# Patient Record
Sex: MALE | Race: WHITE | NOT HISPANIC OR LATINO | URBAN - METROPOLITAN AREA
[De-identification: names, ages, dates, MRNs, and addresses within clinical notes are randomized per-mention and may not be internally consistent; named-entity substitution may affect disease eponyms.]

---

## 2017-08-15 ENCOUNTER — OFFICE (AMBULATORY)
Dept: URBAN - METROPOLITAN AREA CLINIC 75 | Facility: CLINIC | Age: 57
End: 2017-08-15
Payer: COMMERCIAL

## 2017-08-15 VITALS — SYSTOLIC BLOOD PRESSURE: 118 MMHG | DIASTOLIC BLOOD PRESSURE: 74 MMHG | WEIGHT: 5 LBS

## 2017-08-15 DIAGNOSIS — R13.10 DYSPHAGIA, UNSPECIFIED: ICD-10-CM

## 2017-08-15 PROCEDURE — 99202 OFFICE O/P NEW SF 15 MIN: CPT

## 2017-09-07 VITALS
SYSTOLIC BLOOD PRESSURE: 148 MMHG | RESPIRATION RATE: 16 BRPM | OXYGEN SATURATION: 100 % | SYSTOLIC BLOOD PRESSURE: 117 MMHG | HEART RATE: 53 BPM | RESPIRATION RATE: 13 BRPM | OXYGEN SATURATION: 98 % | OXYGEN SATURATION: 99 % | TEMPERATURE: 97.4 F | SYSTOLIC BLOOD PRESSURE: 125 MMHG | DIASTOLIC BLOOD PRESSURE: 64 MMHG | SYSTOLIC BLOOD PRESSURE: 110 MMHG | SYSTOLIC BLOOD PRESSURE: 124 MMHG | RESPIRATION RATE: 20 BRPM | OXYGEN SATURATION: 96 % | HEIGHT: 67 IN | DIASTOLIC BLOOD PRESSURE: 61 MMHG | HEART RATE: 60 BPM | DIASTOLIC BLOOD PRESSURE: 84 MMHG | DIASTOLIC BLOOD PRESSURE: 80 MMHG | HEART RATE: 51 BPM | HEART RATE: 58 BPM | SYSTOLIC BLOOD PRESSURE: 133 MMHG | HEART RATE: 54 BPM | DIASTOLIC BLOOD PRESSURE: 75 MMHG | TEMPERATURE: 98 F | DIASTOLIC BLOOD PRESSURE: 73 MMHG | SYSTOLIC BLOOD PRESSURE: 135 MMHG | HEART RATE: 52 BPM | WEIGHT: 182 LBS | SYSTOLIC BLOOD PRESSURE: 118 MMHG | HEART RATE: 64 BPM | DIASTOLIC BLOOD PRESSURE: 78 MMHG | SYSTOLIC BLOOD PRESSURE: 107 MMHG | HEART RATE: 55 BPM | DIASTOLIC BLOOD PRESSURE: 63 MMHG

## 2017-09-11 ENCOUNTER — OFFICE (AMBULATORY)
Dept: URBAN - METROPOLITAN AREA CLINIC 64 | Facility: CLINIC | Age: 57
End: 2017-09-11
Payer: COMMERCIAL

## 2017-09-11 ENCOUNTER — AMBULATORY SURGICAL CENTER (AMBULATORY)
Dept: URBAN - METROPOLITAN AREA SURGERY 17 | Facility: SURGERY | Age: 57
End: 2017-09-11
Payer: COMMERCIAL

## 2017-09-11 DIAGNOSIS — Z12.11 ENCOUNTER FOR SCREENING FOR MALIGNANT NEOPLASM OF COLON: ICD-10-CM

## 2017-09-11 DIAGNOSIS — R13.10 DYSPHAGIA, UNSPECIFIED: ICD-10-CM

## 2017-09-11 DIAGNOSIS — R93.3 ABNORMAL FINDINGS ON DIAGNOSTIC IMAGING OF OTHER PARTS OF DI: ICD-10-CM

## 2017-09-11 DIAGNOSIS — K31.89 OTHER DISEASES OF STOMACH AND DUODENUM: ICD-10-CM

## 2017-09-11 DIAGNOSIS — K29.70 GASTRITIS, UNSPECIFIED, WITHOUT BLEEDING: ICD-10-CM

## 2017-09-11 LAB
GI HISTOLOGY: A. SELECT: (no result)
GI HISTOLOGY: B. SELECT: (no result)
GI HISTOLOGY: PDF REPORT: (no result)

## 2017-09-11 PROCEDURE — 45378 DIAGNOSTIC COLONOSCOPY: CPT

## 2017-09-11 PROCEDURE — 88305 TISSUE EXAM BY PATHOLOGIST: CPT

## 2017-09-11 PROCEDURE — 43239 EGD BIOPSY SINGLE/MULTIPLE: CPT | Mod: 59

## 2017-09-11 RX ADMIN — PROPOFOL 50 MG: 10 INJECTION, EMULSION INTRAVENOUS at 10:05

## 2017-09-11 RX ADMIN — PROPOFOL 100 MG: 10 INJECTION, EMULSION INTRAVENOUS at 10:01

## 2017-09-11 RX ADMIN — PROPOFOL 50 MG: 10 INJECTION, EMULSION INTRAVENOUS at 10:06

## 2017-11-09 VITALS
HEIGHT: 67 IN | DIASTOLIC BLOOD PRESSURE: 84 MMHG | WEIGHT: 195 LBS | HEART RATE: 78 BPM | SYSTOLIC BLOOD PRESSURE: 106 MMHG

## 2017-11-14 ENCOUNTER — OFFICE (AMBULATORY)
Dept: URBAN - METROPOLITAN AREA CLINIC 75 | Facility: CLINIC | Age: 57
End: 2017-11-14
Payer: COMMERCIAL

## 2017-11-14 DIAGNOSIS — R13.10 DYSPHAGIA, UNSPECIFIED: ICD-10-CM

## 2017-11-14 PROCEDURE — 99212 OFFICE O/P EST SF 10 MIN: CPT

## 2019-08-13 ENCOUNTER — OFFICE VISIT (OUTPATIENT)
Dept: ORTHOPEDIC SURGERY | Facility: CLINIC | Age: 59
End: 2019-08-13

## 2019-08-13 DIAGNOSIS — M25.561 PAIN IN BOTH KNEES, UNSPECIFIED CHRONICITY: ICD-10-CM

## 2019-08-13 DIAGNOSIS — M17.0 PRIMARY OSTEOARTHRITIS OF BOTH KNEES: Primary | ICD-10-CM

## 2019-08-13 DIAGNOSIS — M25.562 PAIN IN BOTH KNEES, UNSPECIFIED CHRONICITY: ICD-10-CM

## 2019-08-13 PROCEDURE — 73562 X-RAY EXAM OF KNEE 3: CPT | Performed by: PHYSICIAN ASSISTANT

## 2019-08-13 PROCEDURE — 99203 OFFICE O/P NEW LOW 30 MIN: CPT | Performed by: PHYSICIAN ASSISTANT

## 2019-08-13 RX ORDER — POTASSIUM CHLORIDE 20MEQ/15ML
LIQUID (ML) ORAL DAILY
COMMUNITY
End: 2021-03-23

## 2019-08-13 RX ORDER — AMLODIPINE BESYLATE 10 MG/1
10 TABLET ORAL DAILY
Refills: 0 | COMMUNITY
Start: 2019-07-10 | End: 2021-03-24

## 2019-08-13 RX ORDER — GABAPENTIN 300 MG/1
300 CAPSULE ORAL NIGHTLY
COMMUNITY
Start: 2018-12-04 | End: 2022-05-03

## 2019-08-13 RX ORDER — LOSARTAN POTASSIUM AND HYDROCHLOROTHIAZIDE 25; 100 MG/1; MG/1
1 TABLET ORAL DAILY
COMMUNITY
End: 2020-02-25 | Stop reason: SDUPTHER

## 2019-08-13 RX ORDER — FUROSEMIDE 40 MG/1
40 TABLET ORAL 2 TIMES DAILY
Refills: 0 | COMMUNITY
Start: 2019-05-24 | End: 2021-03-23

## 2019-08-13 RX ORDER — CARVEDILOL 12.5 MG/1
12.5 TABLET ORAL 2 TIMES DAILY
COMMUNITY
Start: 2019-04-08 | End: 2021-08-19

## 2019-08-20 ENCOUNTER — CLINICAL SUPPORT (OUTPATIENT)
Dept: ORTHOPEDIC SURGERY | Facility: CLINIC | Age: 59
End: 2019-08-20

## 2019-08-20 VITALS — WEIGHT: 219 LBS | TEMPERATURE: 97.9 F | BODY MASS INDEX: 34.37 KG/M2 | HEIGHT: 67 IN

## 2019-08-20 DIAGNOSIS — M17.0 PRIMARY OSTEOARTHRITIS OF BOTH KNEES: Primary | ICD-10-CM

## 2019-08-20 PROBLEM — M25.562 PAIN IN BOTH KNEES: Status: ACTIVE | Noted: 2019-08-20

## 2019-08-20 PROBLEM — M25.561 PAIN IN BOTH KNEES: Status: ACTIVE | Noted: 2019-08-20

## 2019-08-20 PROCEDURE — 20610 DRAIN/INJ JOINT/BURSA W/O US: CPT | Performed by: PHYSICIAN ASSISTANT

## 2019-08-20 RX ORDER — LIDOCAINE HYDROCHLORIDE 10 MG/ML
2 INJECTION, SOLUTION EPIDURAL; INFILTRATION; INTRACAUDAL; PERINEURAL
Status: COMPLETED | OUTPATIENT
Start: 2019-08-20 | End: 2019-08-20

## 2019-08-20 RX ADMIN — LIDOCAINE HYDROCHLORIDE 2 ML: 10 INJECTION, SOLUTION EPIDURAL; INFILTRATION; INTRACAUDAL; PERINEURAL at 10:15

## 2019-08-20 RX ADMIN — LIDOCAINE HYDROCHLORIDE 2 ML: 10 INJECTION, SOLUTION EPIDURAL; INFILTRATION; INTRACAUDAL; PERINEURAL at 10:44

## 2019-08-20 NOTE — PROGRESS NOTES
NEW VISIT    Patient: Jeyson Rose  ?  YOB: 1960    MRN: [id number]  ?  Chief Complaint   Patient presents with   • Left Knee - Establish Care, Pain   • Right Knee - Establish Care, Pain      ?  HPI:   59-year-old male patient presents today as a new patient with complaint of bilateral knee pain.  He states he has had the knee pain for several years and has a history of arthroscopy in both knees as well as steroid injections without any relief of symptoms.  He reports that he remains very active and it becomes very painful if he were to get down on his knees.  He reports the most painful time is at nighttime when he is trying to rest.  He reports there is an aching pain and that the knees pop/grind upon any movement.  Pain Location: bilateral knee(s)  Radiation: To medial face of tibia  Quality: aching and burning  Intensity/Severity: moderate  Duration: 10+ year(s)  Onset quality: gradual   Timing: intermittent  Aggravating Factors: kneeling, standing for prolonged time and walking  Alleviating Factors: rest  Previous Episodes: yes  Associated Symptoms: pain, swelling  ADLs Affected: ambulating recreational activities/sports  Previous Treatment: History of right knee arthroscopy x3 and left knee arthroscopy x2, history of steroid injections without improvement in symptoms    This patient is a new patient.  This problem is new to this examiner.      Allergies:   Allergies   Allergen Reactions   • Sulfa Antibiotics Nausea Only       Medications:   Home Medications:  Current Outpatient Medications on File Prior to Visit   Medication Sig   • amLODIPine (NORVASC) 10 MG tablet Take 10 mg by mouth Daily.   • carvedilol (COREG) 12.5 MG tablet take 1 tablet by mouth twice a day   • furosemide (LASIX) 40 MG tablet Take 40 mg by mouth 2 (Two) Times a Day.   • gabapentin (NEURONTIN) 300 MG capsule Take 300 mg by mouth.   • losartan-hydrochlorothiazide (HYZAAR) 100-25 MG per tablet Take 1 tablet by mouth  "Daily.   • potassium chloride (KAYCIEL) 20 MEQ/15ML (10%) solution Take  by mouth Daily.     No current facility-administered medications on file prior to visit.      Current Medications:  Scheduled Meds:  PRN Meds:.    I have reviewed the patient's medical history in detail and updated the computerized patient record.  Review and summarization of old records include:    No past medical history on file.  No past surgical history on file.  Social History     Occupational History   • Not on file   Tobacco Use   • Smoking status: Former Smoker   Substance and Sexual Activity   • Alcohol use: Yes   • Drug use: Not on file   • Sexual activity: Not on file      Social History     Social History Narrative   • Not on file     No family history on file.      Review of Systems  Constitutional: Negative.  Negative for fever.   Eyes: Negative.    Respiratory: Negative.    Cardiovascular: Negative.    Endocrine: Negative.    Musculoskeletal: Positive for arthralgias, gait problem and joint swelling.   Skin: Negative.  Negative for rash and wound.   Allergic/Immunologic: Negative.    Neurological: Negative for numbness.   Hematological: Negative.    Psychiatric/Behavioral: Negative.         Wt Readings from Last 3 Encounters:   08/20/19 99.3 kg (219 lb)     Ht Readings from Last 3 Encounters:   08/20/19 170.2 cm (67\")     There is no height or weight on file to calculate BMI.  No height and weight on file for this encounter.  There were no vitals filed for this visit.      Physical Exam  Constitutional: Patient is oriented to person, place, and time. Appears well-developed and well-nourished.   HENT:   Head: Normocephalic and atraumatic.   Eyes: Conjunctivae and EOM are normal. Pupils are equal, round, and reactive to light.   Cardiovascular: Normal rate and intact distal pulses.   Pulmonary/Chest: Effort normal and breath sounds normal.   Musculoskeletal:   See detailed exam below   Neurological: Alert and oriented to person, " place, and time. No sensory deficit. Coordination normal.   Skin: Skin is warm and dry. Capillary refill takes less than 2 seconds. No rash noted. No erythema.   Psychiatric: Patient has a normal mood and affect. His behavior is normal. Judgment and thought content normal.   Nursing note and vitals reviewed.      Ortho Exam:   Affected Knee(s): Bilateral knee.   Patient has crepitus throughout range of motion.   Mild effusion on right, no effusion on left.   Mild joint line tenderness is noted on the medial aspect of the knees.   Patient has a varus orientation of the knee.   There is fullness and tenderness in the Popliteal fossa. Mild distention of a Popliteal cyst is noted in this location.   Range of motion: Left knee: Flexion 0- 120 degrees Right knee: Flexion 0-100 degrees  Neurovascular status is intact.  Dorsalis pedis and posterior tibial artery pulses are palpable. Common peroneal nerve function is well preserved.   Patient's gait is cautious and antalgic. Skin and soft tissues are mildly swollen, consistent with synovitis and effusion. The patient has a significant limp with the first few steps after starting the gait cycle.     Lachman negative,   Anterior drawernegative,   Posterior drawer negative,   Tessie's negative,   Patellofemoral grind positive,   Pivot shift is negative.         Diagnostics:  xrays obtained today  X-Ray Report:  Bilateral knee(s) X-Ray  Indication: Evaluation of bilateral knee pain  AP, Lateral views  Findings: Medial joint space narrowing bilaterally, greater on right than left with bone-on-bone appearance.  Patellofemoral joint space narrowing bilaterally and with osteophyte formation present.  Bony lesion: no  Soft tissues: increased  Joint spaces: decreased  Hardware appropriately positioned: not applicable  Prior studies available for comparison: no   X-RAY was ordered and reviewed by Angel Russo PA-C    This patient's x-ray report was graded according to the  Kellgren and Wilber classification.  This took into account the joint space narrowing, osteophyte formation, sclerosis of the distal femur/proximal tibia along with deformity of those bones.  The findings were indicative of K L grade 4 (right) and grade 3 (left).         Assessment:  Jeyson was seen today for establish care, pain, establish care and pain.    Diagnoses and all orders for this visit:    Pain in both knees, unspecified chronicity  -     XR Knee 3 View Bilateral      ?    Plan    · Discussion of x-ray findings in combination with physical exam and appropriate treatment options.   · Cortisone, Monovisc injection discussed  · Activity modifications discussed and recommended  · Use of hinge knee brace discussed and recommended  · Surgical options discussed right total knee.  Patient is aware that both knees will eventually require replacement although right knee will need total knee replacement earlier than left knee.  · Rest, ice, compression, and elevation (RICE) therapy  · Stretching and strengthening exercises  · Alternate OTC Ibuprofen and Tylenol as needed/if clinically indicated  · Patient would like to proceed with getting approval for Monovisc injections    Date of encounter: 08/13/2019   Angel Russo PA-C    Electronically signed by Angel Russo PA-C, 08/20/19, 9:58 AM.

## 2019-12-19 ENCOUNTER — OFFICE VISIT CONVERTED (OUTPATIENT)
Dept: FAMILY MEDICINE CLINIC | Facility: CLINIC | Age: 59
End: 2019-12-19
Attending: NURSE PRACTITIONER

## 2019-12-19 ENCOUNTER — HOSPITAL ENCOUNTER (OUTPATIENT)
Dept: FAMILY MEDICINE CLINIC | Facility: CLINIC | Age: 59
Discharge: HOME | End: 2019-12-19
Attending: NURSE PRACTITIONER

## 2019-12-19 ENCOUNTER — CONVERSION ENCOUNTER (OUTPATIENT)
Dept: FAMILY MEDICINE CLINIC | Facility: CLINIC | Age: 59
End: 2019-12-19

## 2019-12-19 LAB
ALBUMIN SERPL-MCNC: 4 G/DL (ref 3.5–5)
ALBUMIN/GLOB SERPL: 1.2 {RATIO} (ref 1.4–2.6)
ALP SERPL-CCNC: 69 U/L (ref 56–155)
ALT SERPL-CCNC: 34 U/L (ref 10–40)
ANION GAP SERPL CALC-SCNC: 21 MMOL/L (ref 8–19)
AST SERPL-CCNC: 19 U/L (ref 15–50)
BASOPHILS # BLD AUTO: 0.07 10*3/UL (ref 0–0.2)
BASOPHILS NFR BLD AUTO: 0.7 % (ref 0–3)
BILIRUB SERPL-MCNC: 0.25 MG/DL (ref 0.2–1.3)
BUN SERPL-MCNC: 17 MG/DL (ref 5–25)
BUN/CREAT SERPL: 14 {RATIO} (ref 6–20)
CALCIUM SERPL-MCNC: 9.5 MG/DL (ref 8.7–10.4)
CHLORIDE SERPL-SCNC: 102 MMOL/L (ref 99–111)
CHOLEST SERPL-MCNC: 132 MG/DL (ref 107–200)
CHOLEST/HDLC SERPL: 2.8 {RATIO} (ref 3–6)
CONV ABS IMM GRAN: 0.07 10*3/UL (ref 0–0.2)
CONV CO2: 22 MMOL/L (ref 22–32)
CONV IMMATURE GRAN: 0.7 % (ref 0–1.8)
CONV TOTAL PROTEIN: 7.4 G/DL (ref 6.3–8.2)
CREAT UR-MCNC: 1.24 MG/DL (ref 0.7–1.2)
DEPRECATED RDW RBC AUTO: 44 FL (ref 35.1–43.9)
EOSINOPHIL # BLD AUTO: 0.23 10*3/UL (ref 0–0.7)
EOSINOPHIL # BLD AUTO: 2.4 % (ref 0–7)
ERYTHROCYTE [DISTWIDTH] IN BLOOD BY AUTOMATED COUNT: 13.1 % (ref 11.6–14.4)
FOLATE SERPL-MCNC: 13.5 NG/ML (ref 4.8–20)
GFR SERPLBLD BASED ON 1.73 SQ M-ARVRAT: >60 ML/MIN/{1.73_M2}
GLOBULIN UR ELPH-MCNC: 3.4 G/DL (ref 2–3.5)
GLUCOSE SERPL-MCNC: 86 MG/DL (ref 70–99)
HCT VFR BLD AUTO: 43.7 % (ref 42–52)
HDLC SERPL-MCNC: 47 MG/DL (ref 40–60)
HGB BLD-MCNC: 14.4 G/DL (ref 14–18)
LDLC SERPL CALC-MCNC: 60 MG/DL (ref 70–100)
LYMPHOCYTES # BLD AUTO: 1.06 10*3/UL (ref 1–5)
LYMPHOCYTES NFR BLD AUTO: 10.9 % (ref 20–45)
MCH RBC QN AUTO: 30.1 PG (ref 27–31)
MCHC RBC AUTO-ENTMCNC: 33 G/DL (ref 33–37)
MCV RBC AUTO: 91.2 FL (ref 80–96)
MONOCYTES # BLD AUTO: 0.91 10*3/UL (ref 0.2–1.2)
MONOCYTES NFR BLD AUTO: 9.4 % (ref 3–10)
NEUTROPHILS # BLD AUTO: 7.37 10*3/UL (ref 2–8)
NEUTROPHILS NFR BLD AUTO: 75.9 % (ref 30–85)
NRBC CBCN: 0 % (ref 0–0.7)
OSMOLALITY SERPL CALC.SUM OF ELEC: 293 MOSM/KG (ref 273–304)
PLATELET # BLD AUTO: 296 10*3/UL (ref 130–400)
PMV BLD AUTO: 9.5 FL (ref 9.4–12.4)
POTASSIUM SERPL-SCNC: 4.2 MMOL/L (ref 3.5–5.3)
PSA SERPL-MCNC: 0.82 NG/ML (ref 0–4)
RBC # BLD AUTO: 4.79 10*6/UL (ref 4.7–6.1)
SODIUM SERPL-SCNC: 141 MMOL/L (ref 135–147)
TRIGL SERPL-MCNC: 123 MG/DL (ref 40–150)
TSH SERPL-ACNC: 4.86 M[IU]/L (ref 0.27–4.2)
VIT B12 SERPL-MCNC: 874 PG/ML (ref 211–911)
VLDLC SERPL-MCNC: 25 MG/DL (ref 5–37)
WBC # BLD AUTO: 9.71 10*3/UL (ref 4.8–10.8)

## 2019-12-20 LAB — 25(OH)D3 SERPL-MCNC: 35.9 NG/ML (ref 30–100)

## 2020-01-28 ENCOUNTER — TELEPHONE (OUTPATIENT)
Dept: ORTHOPEDIC SURGERY | Facility: CLINIC | Age: 60
End: 2020-01-28

## 2020-01-28 NOTE — TELEPHONE ENCOUNTER
Called the patient to see if the Monovisc injections he had gotten on 8/20/19 helped any?     The patient stated he could definitely tell a difference and that the injections did help especially in his left knee. He feels like the injections really helped his quality of life.

## 2020-02-24 RX ORDER — LOSARTAN POTASSIUM 25 MG/1
25 TABLET ORAL DAILY
COMMUNITY
End: 2020-02-25 | Stop reason: SDUPTHER

## 2020-02-24 RX ORDER — CARBAMAZEPINE 200 MG/1
200 TABLET ORAL DAILY
COMMUNITY
Start: 2019-07-02 | End: 2020-02-25 | Stop reason: SDUPTHER

## 2020-02-24 RX ORDER — POTASSIUM CHLORIDE 20 MEQ/1
20 TABLET, EXTENDED RELEASE ORAL
COMMUNITY
Start: 2016-09-14 | End: 2021-03-23

## 2020-02-24 RX ORDER — LIDOCAINE 40 MG/G
CREAM TOPICAL DAILY
COMMUNITY
Start: 2019-07-02 | End: 2021-03-23

## 2020-02-25 ENCOUNTER — CLINICAL SUPPORT (OUTPATIENT)
Dept: ORTHOPEDIC SURGERY | Facility: CLINIC | Age: 60
End: 2020-02-25

## 2020-02-25 VITALS — HEIGHT: 67 IN | TEMPERATURE: 98.1 F | WEIGHT: 224 LBS | BODY MASS INDEX: 35.16 KG/M2

## 2020-02-25 DIAGNOSIS — M17.0 PRIMARY OSTEOARTHRITIS OF BOTH KNEES: Primary | ICD-10-CM

## 2020-02-25 PROCEDURE — 20610 DRAIN/INJ JOINT/BURSA W/O US: CPT | Performed by: PHYSICIAN ASSISTANT

## 2020-02-25 RX ORDER — AMITRIPTYLINE HYDROCHLORIDE 25 MG/1
50 TABLET, FILM COATED ORAL DAILY
COMMUNITY
Start: 2020-01-06 | End: 2020-02-25 | Stop reason: SDUPTHER

## 2020-02-25 RX ORDER — LOSARTAN POTASSIUM 100 MG/1
100 TABLET ORAL DAILY
COMMUNITY
Start: 2020-02-12 | End: 2021-10-28 | Stop reason: SDUPTHER

## 2020-02-25 RX ORDER — LIDOCAINE HYDROCHLORIDE 10 MG/ML
2 INJECTION, SOLUTION EPIDURAL; INFILTRATION; INTRACAUDAL; PERINEURAL
Status: COMPLETED | OUTPATIENT
Start: 2020-02-25 | End: 2020-02-25

## 2020-02-25 RX ORDER — FUROSEMIDE 40 MG/1
40 TABLET ORAL
COMMUNITY
Start: 2019-05-24 | End: 2020-02-25 | Stop reason: SDUPTHER

## 2020-02-25 RX ORDER — GABAPENTIN 300 MG/1
300 CAPSULE ORAL 3 TIMES DAILY
COMMUNITY
Start: 2020-01-06 | End: 2020-07-04

## 2020-02-25 RX ORDER — CARVEDILOL 12.5 MG/1
TABLET ORAL
COMMUNITY
Start: 2019-04-08 | End: 2020-02-25 | Stop reason: SDUPTHER

## 2020-02-25 RX ADMIN — LIDOCAINE HYDROCHLORIDE 2 ML: 10 INJECTION, SOLUTION EPIDURAL; INFILTRATION; INTRACAUDAL; PERINEURAL at 09:14

## 2020-02-25 NOTE — PROGRESS NOTES
Large Joint Arthrocentesis: R knee  Date/Time: 2/25/2020 9:14 AM  Consent given by: patient  Site marked: site marked  Timeout: Immediately prior to procedure a time out was called to verify the correct patient, procedure, equipment, support staff and site/side marked as required   Supporting Documentation  Indications: pain   Procedure Details  Location: knee - R knee  Preparation: Patient was prepped and draped in the usual sterile fashion  Needle size: 18 G  Approach: anteromedial  Medications administered: 2 mL lidocaine PF 1% 1 %; 88 mg Hyaluronan 88 MG/4ML  Patient tolerance: patient tolerated the procedure well with no immediate complications    Large Joint Arthrocentesis: L knee  Date/Time: 2/25/2020 9:15 AM  Consent given by: patient  Site marked: site marked  Timeout: Immediately prior to procedure a time out was called to verify the correct patient, procedure, equipment, support staff and site/side marked as required   Supporting Documentation  Indications: pain   Procedure Details  Location: knee - L knee  Preparation: Patient was prepped and draped in the usual sterile fashion  Needle size: 18 G  Approach: anteromedial  Medications administered: 88 mg Hyaluronan 88 MG/4ML  Patient tolerance: patient tolerated the procedure well with no immediate complications      Electronically signed by Angel Russo PA-C, 02/25/20, 9:43 AM.

## 2020-02-25 NOTE — PROGRESS NOTES
INJECTION      Patient: Jeyson Rose    MRN: [id number]    YOB: 1960    Chief Complaint   Patient presents with   • Right Knee - Follow-up, Pain   • Left Knee - Follow-up, Pain         History of Present Illness:  Jeyson Rose is here today for injection therapy. He receives bilateral knee injections. Since last injection, patient notes substantial relief of symptoms.  No adverse effects of prior injection.  His first set of Monovisc injections were administered 8/20/2019. Options have been discussed and he understands.       Physical Exam:   59 y.o. male awake, alert, oriented, in no acute distress and well developed, well nourished.  There is Mild joint line tenderness at the medial aspect of the knee. The knee has a varus orientation.   Positive for crepitus throughout range of motion.   Positive for Mild effusion.  Findings are consistent with synovitis and effusion.    Positive patellar grind test.   Negative Lachman test.    Negative anterior and posterior drawer.  Range of motion in extension and flexion is: 0-120 degrees.  Neurovascular status is intact.  Dorsalis pedis and posterior tibial artery pulses are palpable. Common peroneal nerve function is well preserved.   Gait is cautious and antalgic.      Procedure:  See separate procedure note      Injection site was identified by physical examination and cleaned with Betadine and alcohol swabs. Prior to needle insertion, ethyl chloride spray was used for surface anesthesia. Sterile technique was used.       ASSESSMENT:  Jeyson was seen today for follow-up, pain, follow-up and pain.    Diagnoses and all orders for this visit:    Primary osteoarthritis of both knees  -     Large Joint Arthrocentesis: R knee  -     Large Joint Arthrocentesis: L knee  -     lidocaine PF 1% (XYLOCAINE) injection 2 mL  -     Hyaluronan (MONOVISC) injection 88 mg  -     Hyaluronan (MONOVISC) injection 88 mg  -     Visco Treatment; Future          PLAN:    • Bilateral knee Monovisc injection was discussed with the patient. Discussed indication, risks, benefits, and alternatives. Verbal consent was given to proceed with the procedure.   • Injection was performed from anteromedial approach.  Patient tolerated the procedure well and no complications were encountered.  • Discussion of orthopedic goals and activities and patient/guardian expressed understanding.  • Ice, heat, rest, compression and elevation of extremity as beneficial  • nsaids and/or tylenol as beneficial  • Instructed to refrain from heavy activity/rest the extremity for the next 24-48 hours  • Discussion regarding possibility of cortisol flare and what to expect if this occurs  • Watch for signs and symptoms of infection  • Call if adverse effect from injection therapy  • Follow up in 6 months      Angel Russo PA-C  Encounter Date: 2/25/2020    Electronically signed by Angel Russo PA-C, 02/25/20, 9:43 AM.

## 2020-08-17 DIAGNOSIS — M17.0 PRIMARY OSTEOARTHRITIS OF BOTH KNEES: Primary | ICD-10-CM

## 2020-08-31 ENCOUNTER — HOSPITAL ENCOUNTER (OUTPATIENT)
Dept: OTHER | Facility: HOSPITAL | Age: 60
Discharge: HOME | End: 2020-08-31
Attending: ORTHOPAEDIC SURGERY

## 2020-08-31 ENCOUNTER — CLINICAL SUPPORT (OUTPATIENT)
Dept: ORTHOPEDIC SURGERY | Facility: CLINIC | Age: 60
End: 2020-08-31

## 2020-08-31 VITALS — BODY MASS INDEX: 33.34 KG/M2 | WEIGHT: 220 LBS | TEMPERATURE: 98 F | HEIGHT: 68 IN

## 2020-08-31 DIAGNOSIS — M17.0 PRIMARY OSTEOARTHRITIS OF BOTH KNEES: Primary | ICD-10-CM

## 2020-08-31 PROCEDURE — 99212 OFFICE O/P EST SF 10 MIN: CPT | Performed by: PHYSICIAN ASSISTANT

## 2020-08-31 PROCEDURE — 20610 DRAIN/INJ JOINT/BURSA W/O US: CPT | Performed by: PHYSICIAN ASSISTANT

## 2020-08-31 RX ORDER — AMLODIPINE BESYLATE 5 MG/1
5 TABLET ORAL DAILY
COMMUNITY
Start: 2020-08-28 | End: 2021-07-28 | Stop reason: SDUPTHER

## 2020-08-31 RX ORDER — TRAMADOL HYDROCHLORIDE 50 MG/1
TABLET ORAL
Qty: 30 TABLET | Refills: 0 | Status: SHIPPED | OUTPATIENT
Start: 2020-08-31 | End: 2021-03-23

## 2020-08-31 RX ADMIN — LIDOCAINE HYDROCHLORIDE 2 ML: 10 INJECTION, SOLUTION INFILTRATION; PERINEURAL at 09:52

## 2020-09-07 RX ORDER — LIDOCAINE HYDROCHLORIDE 10 MG/ML
2 INJECTION, SOLUTION INFILTRATION; PERINEURAL
Status: COMPLETED | OUTPATIENT
Start: 2020-08-31 | End: 2020-08-31

## 2020-09-07 NOTE — PROGRESS NOTES
INJECTION      Patient: Jeyson Rose    MRN: [id number]    YOB: 1960    Chief Complaint   Patient presents with   • Right Knee - Pain, Follow-up   • Left Knee - Follow-up, Pain   • Injections         History of Present Illness:  Jeyson Rose is here today for injection therapy. He receives  BILATERAL knee injections of Monovisc. Since last injection, patient notes substantial relief of symptoms. Over the last month he has been experiencing increasing pain in the medial aspect of the left knee. He is icing the knee 3-4x/day.Treatment options have been discussed and he understands and consents.       Physical Exam:   60 y.o. male awake, alert, oriented, in no acute distress and well developed, well nourished.  BILATERAL knee  There is moderate joint line tenderness at the medial aspect of the knee. The knee has a varus orientation.   Positive for crepitus throughout range of motion.   Positive for mild effusion.  Findings are consistent with synovitis and effusion.    Positive patellar grind test.   Negative Lachman test.    Negative anterior and posterior drawer.  Range of motion in extension and flexion is: 0-120 degrees.  Neurovascular status is intact.  Dorsalis pedis and posterior tibial artery pulses are palpable. Common peroneal nerve function is well preserved.   Gait is cautious and antalgic.        Diagnostics:  Bilateral knee xrays 3 views were ordered by Angel Russo PA-C and performed at [facility]. These images were independently viewed and interpreted by myself, my impression as follows:   Findings:   Right Knee: medial joint space is bone on bone, with moderate patellofemoral change and mild lateral joint space narrowing  Left Knee: medial joint space with moderate narrowing/near bone on bone, mild patellofemoral and lateral joint space narrowing  Bony lesion: no  Soft tissues: within normal limits  Joint spaces: decreased  Hardware appropriately  positioned: not applicable  Prior studies available for comparison: yes from 8/2019-today's views reveal slight worsening of condition          Procedures  See separate procedure note  Injection site was identified by physical examination and cleaned with Betadine and alcohol swabs. Prior to needle insertion, ethyl chloride spray was used for surface anesthesia. Sterile technique was used.       ASSESSMENT:  Jeyson was seen today for injections, pain, follow-up, follow-up and pain.    Diagnoses and all orders for this visit:    Primary osteoarthritis of both knees  -     Visco Treatment  -     Large Joint Arthrocentesis: R knee  -     Large Joint Arthrocentesis: L knee  -     traMADol (ULTRAM) 50 MG tablet; Take 1-2 po QHS PRN Pain  -     Visco Treatment; Future          PLAN:   New Medications Ordered This Visit   Medications   • traMADol (ULTRAM) 50 MG tablet     Sig: Take 1-2 po QHS PRN Pain     Dispense:  30 tablet     Refill:  0      • Bilateral knee Monovisc injection was discussed with the patient. Discussed indication, risks, benefits, and alternatives. Verbal consent was given to proceed with the procedure.   • Injection was performed from anteromedial approach.  Patient tolerated the procedure well and no complications were encountered.  • Discussion of orthopedic goals and activities and patient/guardian expressed understanding.  • Ice, heat, rest, compression and elevation of extremity as beneficial  • nsaids and/or tylenol as beneficial  • Instructed to refrain from heavy activity/rest the extremity for the next 24-48 hours  • Discussion regarding possibility of cortisol flare and what to expect if this occurs  • Watch for signs and symptoms of infection  • Call if adverse effect from injection therapy  • Follow up in 6 months. Can follow up in 3 months if he feels he needs a steroid injection  • Time spent with patient: 10 minutes face-to-face with greater than 50% spent in counseling and coordination  of care. This time included review and discussion of xray findings, patient's symptoms, and how to proceed with treatment.      Angel Russo PA-C  Encounter Date: 8/31/2020    Electronically signed by Angel Russo PA-C, 09/07/20, 4:43 PM.      EMR Dragon/Transcription disclaimer:  Much of this encounter note is an electronic transcription/translation of spoken language to printed text. The electronic translation of spoken language may permit erroneous, or at times, nonsensical words or phrases to be inadvertently transcribed; Although I have reviewed the note for such errors, some may still exist.

## 2020-11-03 ENCOUNTER — HOSPITAL ENCOUNTER (OUTPATIENT)
Dept: URGENT CARE | Facility: CLINIC | Age: 60
Discharge: HOME | End: 2020-11-03
Attending: FAMILY MEDICINE

## 2021-02-03 DIAGNOSIS — M17.0 PRIMARY OSTEOARTHRITIS OF BOTH KNEES: Primary | ICD-10-CM

## 2021-02-04 ENCOUNTER — OFFICE VISIT (OUTPATIENT)
Dept: ORTHOPEDIC SURGERY | Facility: CLINIC | Age: 61
End: 2021-02-04

## 2021-02-04 ENCOUNTER — HOSPITAL ENCOUNTER (OUTPATIENT)
Dept: OTHER | Facility: HOSPITAL | Age: 61
Discharge: HOME | End: 2021-02-04
Attending: ORTHOPAEDIC SURGERY

## 2021-02-04 VITALS — TEMPERATURE: 97.2 F | BODY MASS INDEX: 38.58 KG/M2 | HEIGHT: 64 IN | WEIGHT: 226 LBS

## 2021-02-04 DIAGNOSIS — M17.0 PRIMARY OSTEOARTHRITIS OF BOTH KNEES: Primary | ICD-10-CM

## 2021-02-04 PROCEDURE — 99214 OFFICE O/P EST MOD 30 MIN: CPT | Performed by: ORTHOPAEDIC SURGERY

## 2021-02-04 NOTE — PROGRESS NOTES
NEW VISIT    Patient: Jeyson Roes  ?  YOB: 1960    MRN: [id number]  ?  Chief Complaint   Patient presents with   • Right Knee - Follow-up, Pain   • Left Knee - Follow-up, Pain      ?  HPI: FU BILATERAL KNEE PAIN DISCUSS SURGERY  Jeyson Fletcher presents to the office with increasing pain and discomfort in both his knees.  The right is more symptomatic than the left.  He states that he has been having symptoms for about 2 years.  Over the past few months his symptoms have gotten a whole lot worse.  He has difficulty in ambulation and difficulty with squatting on the ground.  He has had intra-articular injections both steroids and gel injections which have really not helped him to improve his symptoms.  He states that he is in a lot of pain and discomfort.  He has had a prior history of knee arthroscopy on this knee joint.  He states that his quality of life is very negative and he is unable to carry out his day-to-day activities.  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  He states that he has completely recovered from that malignancy.  Pain Location: BILATERAL knee  Radiation: none  Quality: dull, aching  Intensity/Severity: moderate  Duration: 2 years  Onset quality: gradual   Timing: intermittent  Aggravating Factors: kneeling, squatting  Alleviating Factors: NSAIDs, oral pain medication  Previous Episodes: yes  Associated Symptoms: pain, swelling  ADLs Affected: ambulating, work related activities, recreational activities/sports  Previous Treatment: Anti-inflammatory medication.    This patient is a new patient.  This problem is new to this examiner.      Allergies:   Allergies   Allergen Reactions   • Sulfa Antibiotics Nausea Only       Medications:   Home Medications:  Current Outpatient Medications on File Prior to Visit   Medication Sig   • amLODIPine (NORVASC) 10 MG tablet Take 10 mg by mouth Daily.   • amLODIPine (NORVASC) 5 MG tablet     • aspirin 81 MG tablet Take 81 mg by mouth Daily.   • carvedilol (COREG) 12.5 MG tablet take 1 tablet by mouth twice a day   • furosemide (LASIX) 40 MG tablet Take 40 mg by mouth 2 (Two) Times a Day.   • gabapentin (NEURONTIN) 300 MG capsule Take 300 mg by mouth.   • lidocaine (LMX) 4 % cream Apply  topically to the appropriate area as directed Daily.   • losartan (COZAAR) 100 MG tablet    • MULTIPLE VITAMINS-MINERALS ER PO Take 1 tablet by mouth Daily.   • potassium chloride (K-DUR,KLOR-CON) 20 MEQ CR tablet Take 20 mEq by mouth.   • potassium chloride (KAYCIEL) 20 MEQ/15ML (10%) solution Take  by mouth Daily.   • traMADol (ULTRAM) 50 MG tablet Take 1-2 po QHS PRN Pain     No current facility-administered medications on file prior to visit.      Current Medications:  Scheduled Meds:  PRN Meds:.    I have reviewed the patient's medical history in detail and updated the computerized patient record.  Review and summarization of old records include:    Past Medical History:   Diagnosis Date   • Cancer of neck (CMS/HCC)    • Hypertension      Past Surgical History:   Procedure Laterality Date   • NECK SURGERY       Social History     Occupational History   • Not on file   Tobacco Use   • Smoking status: Former Smoker   • Smokeless tobacco: Never Used   Substance and Sexual Activity   • Alcohol use: Yes   • Drug use: Defer   • Sexual activity: Defer      No family history on file.      Review of Systems   Constitutional: Negative.  Negative for fever.   HENT: Negative.    Eyes: Negative.    Respiratory: Negative.    Cardiovascular: Negative.    Endocrine: Negative.    Genitourinary: Negative.    Musculoskeletal: Positive for arthralgias, gait problem and joint swelling.   Skin: Negative.  Negative for rash and wound.   Allergic/Immunologic: Negative.    Neurological: Negative for numbness.   Hematological: Negative.    Psychiatric/Behavioral: Negative.           Wt Readings from Last 3 Encounters:   02/04/21 103 kg  "(226 lb)   08/31/20 99.8 kg (220 lb)   02/25/20 102 kg (224 lb)     Ht Readings from Last 3 Encounters:   02/04/21 162.6 cm (64\")   08/31/20 172.7 cm (68\")   02/25/20 170.2 cm (67\")     Body mass index is 38.79 kg/m².  Facility age limit for growth percentiles is 20 years.  Vitals:    02/04/21 0853   Temp: 97.2 °F (36.2 °C)         Physical Exam  Constitutional: Patient is oriented to person, place, and time. Appears well-developed and well-nourished.   HENT:   Head: Normocephalic and atraumatic.   Eyes: Conjunctivae and EOM are normal. Pupils are equal, round, and reactive to light.   Cardiovascular: Normal rate, regular rhythm, normal heart sounds and intact distal pulses.   Pulmonary/Chest: Effort normal and breath sounds normal.   Musculoskeletal:   See detailed exam below   Neurological: Alert and oriented to person, place, and time. No sensory deficit. Coordination normal.   Skin: Skin is warm and dry. Capillary refill takes less than 2 seconds. No rash noted. No erythema.   Psychiatric: Patient has a normal mood and affect. His behavior is normal. Judgment and thought content normal.   Nursing note and vitals reviewed.      Ortho Exam:   Bilateral knee (varus). Patient has crepitus throughout range of motion. Positive patellar grind test. Mild effusion. Lachman is negative. Pivot shift is negative. Anterior and posterior drawer signs are negative. Significant joint line tenderness is noted on the medial aspect of the knee. Patient has a varus orientation of the knee. There is fullness and tenderness in the Popliteal fossa. Mild distention of a Popliteal cyst is noted in this location. Range of motion in flexion is from 0-110 degrees. Neurovascular status is intact.  Dorsalis pedis and posterior tibial artery pulses are palpable. Common peroneal nerve function is well preserved. Patient's gait is cautious and antalgic. Skin and soft tissues are mildly swollen, consistent with synovitis and effusion. The " patient has a significant limp with the first few steps after starting the gait cycle. Getting out of a chair takes a lot of effort due to pain on knee flexion.         Diagnostics:  xrays obtained today  bilateral Knee X-Ray  Indication: Evaluation of pain and discomfort on the medial aspect of both knees.  AP, Lateral views  Findings: advanced DJD of the medial compartment  no bony lesion  Soft tissues within normal limits  decreased joint spaces  Hardware appropriately positioned not applicable      no prior studies available for comparison.    This patient's x-ray report was graded according to the Kellgren and Wilber classification.  This took into account the joint space narrowing, osteophyte formation, sclerosis of the distal femur/proximal tibia along with deformity of those bones.  The findings were indicative of K L grade 3.    X-RAY was ordered and reviewed by Song Marsh MD    Assessment:  Diagnoses and all orders for this visit:    1. Primary osteoarthritis of both knees (Primary)          Procedures  ?    Plan    · Compression/brace to the knee to prevent it from buckling and giving out.  · The patient does not want any further injection therapy because that is really not helped his symptoms.  · He now wants to proceed with total knee arthroplasty on the right knee in the near future.  The patient was seen today for preoperative discussion.  The patient has been tried on over-the-counter and prescription NSAID's despite the risks of anti-inflammatory bleeding, peptic ulcers and erosive gastritis with short term benefit only.  Braces have been prescribed for mechanical support.  Patient has been participating in an exercise program specifically targeting joint pain relief with limited benefit. Intraarticular injections have been used periodically with some but not complete relief of pain.  Ambulation aids have also been utilized.    · The details of the surgical procedure were explained including  the location of probable incisions and a description of the likely hardware/grafts to be used. The patient understands the likely convalescence after surgery as well as the rehabilitation required.  Also, we have thoroughly discussed with the patient the risks, benefits and alternatives to surgery.  Risks include but are not limited to the risk of infection, joint stiffness, limited range of motion, wound healing problems, scar tissue build up, myocardial infarction, stroke, blood clots (including DVT and/or pulmonary embolus along with the risk of death) neurologic and/or vascular injury, limb length discrepancy, fracture, dislocation, nonunion, malunion, continued pain and need for further surgery including hardware failure requiring revision.   · Rest, ice, compression, and elevation (RICE) therapy  · Stretching and strengthening exercises of the quads and the hamstrings.  · Time spent in the office today with the patient going over treatment options and the rationale for surgical intervention is 45 minutes.  Greater than 50% of my time was spent face-to-face with treatment options and potential complications of the surgical procedure for right total knee arthroplasty.  · Tylenol 500-1000mg by mouth every 6 hours as needed for pain   · Follow up in 6 week(s)    Date of encounter: 02/04/2021   Song Marsh MD

## 2021-02-18 RX ORDER — PREGABALIN 75 MG/1
150 CAPSULE ORAL ONCE
Status: CANCELLED | OUTPATIENT
Start: 2021-03-26 | End: 2021-02-18

## 2021-02-18 RX ORDER — ACETAMINOPHEN 325 MG/1
1000 TABLET ORAL ONCE
Status: CANCELLED | OUTPATIENT
Start: 2021-03-26 | End: 2021-02-18

## 2021-02-18 RX ORDER — MELOXICAM 15 MG/1
15 TABLET ORAL ONCE
Status: CANCELLED | OUTPATIENT
Start: 2021-03-26 | End: 2021-02-18

## 2021-02-18 RX ORDER — CEFAZOLIN SODIUM 2 G/100ML
2 INJECTION, SOLUTION INTRAVENOUS ONCE
Status: CANCELLED | OUTPATIENT
Start: 2021-03-26 | End: 2021-02-18

## 2021-03-23 ENCOUNTER — TELEPHONE (OUTPATIENT)
Dept: ORTHOPEDIC SURGERY | Facility: HOSPITAL | Age: 61
End: 2021-03-23

## 2021-03-23 NOTE — TELEPHONE ENCOUNTER
Pt is requesting to go home same day if at all possible.   Pre-op OTJA Call  Pain Risk:  ? Currently on narcotics   ? ETOH > 3 drinks/day  ? Pain management patient   ? Current cannaboid use  No issues to address  Cardiac Risk:  ? Arrhythmias  ? Stent/MI  ? Pacer  ? Heart Failure  No issues to address  Respiratory Risk:  ? Sleep apnea  ? CPAP machine use  ? Nightly snoring  ? Asthma/COPD  No issues to address  Diabetic Risk:  HgA1C:  Click or tap here to enter text.  ? Insulin use  ? More than 1 diabetic medication  No issues to address  Urinary retention:  No    Caregiver 24-48hrs post-discharge: Possibly Home with wife      DME:  ? None/will need before discharge  ? Have walker and/or cane    Discharge Plan:  Home with OP PT     Prescriptions:  Meds to bed    Educate patient on spinal anesthesia/pain control:  ? patient verbalize understanding    Educate patient on hospital course/timeline:  ?  patient verbalize understanding  3 steps to enter house

## 2021-03-24 ENCOUNTER — APPOINTMENT (OUTPATIENT)
Dept: PREADMISSION TESTING | Facility: HOSPITAL | Age: 61
End: 2021-03-24

## 2021-03-24 ENCOUNTER — HOSPITAL ENCOUNTER (OUTPATIENT)
Dept: GENERAL RADIOLOGY | Facility: HOSPITAL | Age: 61
Discharge: HOME | End: 2021-03-24

## 2021-03-24 VITALS
OXYGEN SATURATION: 97 % | TEMPERATURE: 97.6 F | HEART RATE: 70 BPM | BODY MASS INDEX: 34.56 KG/M2 | HEIGHT: 68 IN | RESPIRATION RATE: 16 BRPM | DIASTOLIC BLOOD PRESSURE: 92 MMHG | WEIGHT: 228 LBS | SYSTOLIC BLOOD PRESSURE: 178 MMHG

## 2021-03-24 DIAGNOSIS — M17.0 PRIMARY OSTEOARTHRITIS OF BOTH KNEES: ICD-10-CM

## 2021-03-24 LAB
ABO GROUP BLD: NORMAL
ANION GAP SERPL CALCULATED.3IONS-SCNC: 9.8 MMOL/L (ref 5–15)
BILIRUB UR QL STRIP: NEGATIVE
BLD GP AB SCN SERPL QL: NEGATIVE
BUN SERPL-MCNC: 17 MG/DL (ref 8–23)
BUN/CREAT SERPL: 16.3 (ref 7–25)
CALCIUM SPEC-SCNC: 8.9 MG/DL (ref 8.6–10.5)
CHLORIDE SERPL-SCNC: 103 MMOL/L (ref 98–107)
CLARITY UR: CLEAR
CO2 SERPL-SCNC: 26.2 MMOL/L (ref 22–29)
COLOR UR: YELLOW
CREAT SERPL-MCNC: 1.04 MG/DL (ref 0.76–1.27)
DEPRECATED RDW RBC AUTO: 47.6 FL (ref 37–54)
ERYTHROCYTE [DISTWIDTH] IN BLOOD BY AUTOMATED COUNT: 13.9 % (ref 12.3–15.4)
GFR SERPL CREATININE-BSD FRML MDRD: 72 ML/MIN/1.73
GLUCOSE SERPL-MCNC: 106 MG/DL (ref 65–99)
GLUCOSE UR STRIP-MCNC: NEGATIVE MG/DL
HCT VFR BLD AUTO: 44.8 % (ref 37.5–51)
HGB BLD-MCNC: 14.8 G/DL (ref 13–17.7)
HGB UR QL STRIP.AUTO: NEGATIVE
KETONES UR QL STRIP: NEGATIVE
LEUKOCYTE ESTERASE UR QL STRIP.AUTO: NEGATIVE
MCH RBC QN AUTO: 30.3 PG (ref 26.6–33)
MCHC RBC AUTO-ENTMCNC: 33 G/DL (ref 31.5–35.7)
MCV RBC AUTO: 91.8 FL (ref 79–97)
NITRITE UR QL STRIP: NEGATIVE
PH UR STRIP.AUTO: 7 [PH] (ref 5–8)
PLATELET # BLD AUTO: 232 10*3/MM3 (ref 140–450)
PMV BLD AUTO: 9.1 FL (ref 6–12)
POTASSIUM SERPL-SCNC: 4 MMOL/L (ref 3.5–5.2)
PROT UR QL STRIP: NEGATIVE
RBC # BLD AUTO: 4.88 10*6/MM3 (ref 4.14–5.8)
RH BLD: POSITIVE
SODIUM SERPL-SCNC: 139 MMOL/L (ref 136–145)
SP GR UR STRIP: 1.01 (ref 1–1.03)
T&S EXPIRATION DATE: NORMAL
UROBILINOGEN UR QL STRIP: NORMAL
WBC # BLD AUTO: 5.44 10*3/MM3 (ref 3.4–10.8)

## 2021-03-24 PROCEDURE — 86900 BLOOD TYPING SEROLOGIC ABO: CPT

## 2021-03-24 PROCEDURE — 73560 X-RAY EXAM OF KNEE 1 OR 2: CPT

## 2021-03-24 PROCEDURE — U0004 COV-19 TEST NON-CDC HGH THRU: HCPCS | Performed by: NURSE PRACTITIONER

## 2021-03-24 PROCEDURE — 80048 BASIC METABOLIC PNL TOTAL CA: CPT

## 2021-03-24 PROCEDURE — 36415 COLL VENOUS BLD VENIPUNCTURE: CPT

## 2021-03-24 PROCEDURE — 86901 BLOOD TYPING SEROLOGIC RH(D): CPT

## 2021-03-24 PROCEDURE — C9803 HOPD COVID-19 SPEC COLLECT: HCPCS | Performed by: NURSE PRACTITIONER

## 2021-03-24 PROCEDURE — 85027 COMPLETE CBC AUTOMATED: CPT

## 2021-03-24 PROCEDURE — 81003 URINALYSIS AUTO W/O SCOPE: CPT

## 2021-03-24 PROCEDURE — 86850 RBC ANTIBODY SCREEN: CPT

## 2021-03-24 NOTE — DISCHARGE INSTRUCTIONS
Take the following medications the morning of surgery:  AMLODIPINE, CARVEDILOL      ARRIVE TO MAIN OR DESK 3/26/21 AT 9:00AM    If you are on prescription narcotic pain medication to control your pain you may also take that medication the morning of surgery.    General Instructions:  • Do not eat solid food after midnight the night before surgery.  • You may drink clear liquids day of surgery but must stop at least one hour before your hospital arrival time.(8:00AM)  • It is beneficial for you to have a clear drink that contains carbohydrates the day of surgery.  We suggest a 12 to 20 ounce bottle of Gatorade or Powerade for non-diabetic patients or a 12 to 20 ounce bottle of G2 or Powerade Zero for diabetic patients. (Pediatric patients, are not advised to drink a 12 to 20 ounce carbohydrate drink)    Clear liquids are liquids you can see through.  Nothing red in color.     Plain water                               Sports drinks  Sodas                                   Gelatin (Jell-O)  Fruit juices without pulp such as white grape juice and apple juice  Popsicles that contain no fruit or yogurt  Tea or coffee (no cream or milk added)  Gatorade / Powerade  G2 / Powerade Zero    • Infants may have breast milk up to four hours before surgery.  • Infants drinking formula may drink formula up to six hours before surgery.   • Patients who avoid smoking, chewing tobacco and alcohol for 4 weeks prior to surgery have a reduced risk of post-operative complications.  Quit smoking as many days before surgery as you can.  • Do not smoke, use chewing tobacco or drink alcohol the day of surgery.   • If applicable bring your C-PAP/ BI-PAP machine.  • Bring any papers given to you in the doctor’s office.  • Wear clean comfortable clothes.  • Do not wear contact lenses, false eyelashes or make-up.  Bring a case for your glasses.   • Bring crutches or walker if applicable.  • Remove all piercings.  Leave jewelry and any other  valuables at home.  • Hair extensions with metal clips must be removed prior to surgery.  • The Pre-Admission Testing nurse will instruct you to bring medications if unable to obtain an accurate list in Pre-Admission Testing.        If you were given a blood bank ID arm band remember to bring it with you the day of surgery.    Preventing a Surgical Site Infection:  • For 2 to 3 days before surgery, avoid shaving with a razor because the razor can irritate skin and make it easier to develop an infection.    • Any areas of open skin can increase the risk of a post-operative wound infection by allowing bacteria to enter and travel throughout the body.  Notify your surgeon if you have any skin wounds / rashes even if it is not near the expected surgical site.  The area will need assessed to determine if surgery should be delayed until it is healed.  • The night prior to surgery shower using a fresh bar of anti-bacterial soap (such as Dial) and clean washcloth.  Sleep in a clean bed with clean clothing.  Do not allow pets to sleep with you.  • Shower on the morning of surgery using a fresh bar of anti-bacterial soap (such as Dial) and clean washcloth.  Dry with a clean towel and dress in clean clothing.  • Ask your surgeon if you will be receiving antibiotics prior to surgery.  • Make sure you, your family, and all healthcare providers clean their hands with soap and water or an alcohol based hand  before caring for you or your wound.    Day of surgery:  Your arrival time is approximately two hours before your scheduled surgery time.  Upon arrival, a Pre-op nurse and Anesthesiologist will review your health history, obtain vital signs, and answer questions you may have.  The only belongings needed at this time will be a list of your home medications and if applicable your C-PAP/BI-PAP machine.  A Pre-op nurse will start an IV and you may receive medication in preparation for surgery, including something to help  you relax.     Please be aware that surgery does come with discomfort.  We want to make every effort to control your discomfort so please discuss any uncontrolled symptoms with your nurse.   Your doctor will most likely have prescribed pain medications.      If you are going home after surgery you will receive individualized written care instructions before being discharged.  A responsible adult must drive you to and from the hospital on the day of your surgery and stay with you for 24 hours.  Discharge prescriptions can be filled by the hospital pharmacy during regular pharmacy hours.  If you are having surgery late in the day/evening your prescription may be e-prescribed to your pharmacy.  Please verify your pharmacy hours or chose a 24 hour pharmacy to avoid not having access to your prescription because your pharmacy has closed for the day.    If you are staying overnight following surgery, you will be transported to your hospital room following the recovery period.  [facility] has all private rooms.    If you have any questions please call Pre-Admission Testing at [phone].  Deductibles and co-payments are collected on the day of service. Please be prepared to pay the required co-pay, deductible or deposit on the day of service as defined by your plan.    Patient Education for Self-Quarantine Process    Following your COVID testing, we strongly recommend that you do not leave your home after you have been tested for COVID except to get medical care. This includes not going to work, school or to public areas.  If this is not possible for you to do please limit your activities to only required outings.  Be sure to wear a mask when you are with other people, practice social distancing and wash your hands frequently.      The following items provide additional details to keep you safe.  • Wash your hands with soap and water frequently for at least 20 seconds.   • Avoid touching your eyes, nose  and mouth with unwashed hands.  • Do not share anything - utensils, towels, food from the same bowl.   • Have your own utensils, drinking glass, dishes, towels and bedding.   • Do not have visitors.   • Do use FaceTime to stay in touch with family and friends.  • You should stay in a specific room away from others if possible.   • Stay at least 6 feet away from others in the home if you cannot have a dedicated room to yourself.   • Do not snuggle with your pet. While the CDC says there is no evidence that pets can spread COVID-19 or be infected from humans, it is probably best to avoid “petting, snuggling, being kissed or licked and sharing food (during self-quarantine)”, according to the CDC.   • Sanitize household surfaces daily. Include all high touch areas (door handles, light switches, phones, countertops, etc.)  • Do not share a bathroom with others, if possible.   • Wear a mask around others in your home if you are unable to stay in a separate room or 6 feet apart. If  you are unable to wear a mask, have your family member wear a mask if they must be within 6 feet of you.   Call your surgeon immediately if you experience any of the following symptoms:  • Sore Throat  • Shortness of Breath or difficulty breathing  • Cough  • Chills  • Body soreness or muscle pain  • Headache  • Fever  • New loss of taste or smell  • Do not arrive for your surgery ill.  Your procedure will need to be rescheduled to another time.  You will need to call your physician before the day of surgery to avoid any unnecessary exposure to hospital staff as well as other patients.    CHLORHEXIDINE CLOTH INSTRUCTIONS  The morning of surgery follow these instructions using the Chlorhexidine cloths you've been given.  These steps reduce bacteria on the body.  Do not use the cloths near your eyes, ears mouth, genitalia or on open wounds.  Throw the cloths away after use but do not try to flush them down a toilet.      • Open and remove one  cloth at a time from the package.    • Leave the cloth unfolded and begin the bathing.  • Massage the skin with the cloths using gentle pressure to remove bacteria.  Do not scrub harshly.   • Follow the steps below with one 2% CHG cloth per area (6 total cloths).  • One cloth for neck, shoulders and chest.  • One cloth for both arms, hands, fingers and underarms (do underarms last).  • One cloth for the abdomen followed by groin.  • One cloth for right leg and foot including between the toes.  • One cloth for left leg and foot including between the toes.  • The last cloth is to be used for the back of the neck, back and buttocks.    Allow the CHG to air dry 3 minutes on the skin which will give it time to work and decrease the chance of irritation.  The skin may feel sticky until it is dry.  Do not rinse with water or any other liquid or you will lose the beneficial effects of the CHG.  If mild skin irritation occurs, do rinse the skin to remove the CHG.  Report this to the nurse at time of admission.  Do not apply lotions, creams, ointments, deodorants or perfumes after using the clothes. Dress in clean clothes before coming to the hospital.    BACTROBAN NASAL OINTMENT  There are many germs normally in your nose. Bactroban is an ointment that will help reduce these germs. Please follow these instructions for Bactroban use:      ____The day before surgery in the morning  Date________    ____The day before surgery in the evening              Date________    ____The day of surgery in the morning    Date________    **Squirt ½ package of Bactroban Ointment onto a cotton applicator and apply to inside of 1st nostril.  Squirt the remaining Bactroban and apply to the inside of the other nostril.

## 2021-03-25 LAB — SARS-COV-2 RNA RESP QL NAA+PROBE: NOT DETECTED

## 2021-03-26 ENCOUNTER — ANESTHESIA EVENT (OUTPATIENT)
Dept: PERIOP | Facility: HOSPITAL | Age: 61
End: 2021-03-26

## 2021-03-26 ENCOUNTER — HOSPITAL ENCOUNTER (OUTPATIENT)
Facility: HOSPITAL | Age: 61
Discharge: HOME | End: 2021-03-26
Attending: ORTHOPAEDIC SURGERY | Admitting: ORTHOPAEDIC SURGERY

## 2021-03-26 ENCOUNTER — APPOINTMENT (OUTPATIENT)
Dept: GENERAL RADIOLOGY | Facility: HOSPITAL | Age: 61
End: 2021-03-26

## 2021-03-26 ENCOUNTER — ANESTHESIA (OUTPATIENT)
Dept: PERIOP | Facility: HOSPITAL | Age: 61
End: 2021-03-26

## 2021-03-26 VITALS
WEIGHT: 225.97 LBS | HEIGHT: 68 IN | OXYGEN SATURATION: 95 % | RESPIRATION RATE: 16 BRPM | TEMPERATURE: 97.4 F | SYSTOLIC BLOOD PRESSURE: 129 MMHG | HEART RATE: 64 BPM | BODY MASS INDEX: 34.25 KG/M2 | DIASTOLIC BLOOD PRESSURE: 80 MMHG

## 2021-03-26 DIAGNOSIS — M17.0 PRIMARY OSTEOARTHRITIS OF BOTH KNEES: ICD-10-CM

## 2021-03-26 DIAGNOSIS — M17.0 PRIMARY OSTEOARTHRITIS OF BOTH KNEES: Primary | ICD-10-CM

## 2021-03-26 PROBLEM — Z96.651 S/P TKR (TOTAL KNEE REPLACEMENT), RIGHT: Status: ACTIVE | Noted: 2021-03-26

## 2021-03-26 PROCEDURE — 25010000003 BUPIVACAINE LIPOSOME 1.3 % SUSPENSION: Performed by: ORTHOPAEDIC SURGERY

## 2021-03-26 PROCEDURE — 25010000002 NEOSTIGMINE 5 MG/10ML SOLUTION: Performed by: NURSE ANESTHETIST, CERTIFIED REGISTERED

## 2021-03-26 PROCEDURE — 25010000002 FENTANYL CITRATE (PF) 100 MCG/2ML SOLUTION: Performed by: NURSE ANESTHETIST, CERTIFIED REGISTERED

## 2021-03-26 PROCEDURE — 25010000002 SUCCINYLCHOLINE PER 20 MG: Performed by: NURSE ANESTHETIST, CERTIFIED REGISTERED

## 2021-03-26 PROCEDURE — 25010000002 HYDROMORPHONE PER 4 MG: Performed by: NURSE ANESTHETIST, CERTIFIED REGISTERED

## 2021-03-26 PROCEDURE — 97161 PT EVAL LOW COMPLEX 20 MIN: CPT

## 2021-03-26 PROCEDURE — C1713 ANCHOR/SCREW BN/BN,TIS/BN: HCPCS | Performed by: ORTHOPAEDIC SURGERY

## 2021-03-26 PROCEDURE — 97530 THERAPEUTIC ACTIVITIES: CPT

## 2021-03-26 PROCEDURE — G0378 HOSPITAL OBSERVATION PER HR: HCPCS

## 2021-03-26 PROCEDURE — C1776 JOINT DEVICE (IMPLANTABLE): HCPCS | Performed by: ORTHOPAEDIC SURGERY

## 2021-03-26 PROCEDURE — 27447 TOTAL KNEE ARTHROPLASTY: CPT | Performed by: ORTHOPAEDIC SURGERY

## 2021-03-26 PROCEDURE — A9270 NON-COVERED ITEM OR SERVICE: HCPCS | Performed by: ORTHOPAEDIC SURGERY

## 2021-03-26 PROCEDURE — 25010000002 ONDANSETRON PER 1 MG: Performed by: NURSE ANESTHETIST, CERTIFIED REGISTERED

## 2021-03-26 PROCEDURE — 63710000001 PREGABALIN 75 MG CAPSULE: Performed by: ORTHOPAEDIC SURGERY

## 2021-03-26 PROCEDURE — 25010000002 MIDAZOLAM PER 1 MG: Performed by: ANESTHESIOLOGY

## 2021-03-26 PROCEDURE — 97110 THERAPEUTIC EXERCISES: CPT

## 2021-03-26 PROCEDURE — S0260 H&P FOR SURGERY: HCPCS | Performed by: ORTHOPAEDIC SURGERY

## 2021-03-26 PROCEDURE — 63710000001 ACETAMINOPHEN 325 MG TABLET: Performed by: ORTHOPAEDIC SURGERY

## 2021-03-26 PROCEDURE — 25010000003 CEFAZOLIN IN DEXTROSE 2-4 GM/100ML-% SOLUTION: Performed by: ORTHOPAEDIC SURGERY

## 2021-03-26 PROCEDURE — C1889 IMPLANT/INSERT DEVICE, NOC: HCPCS | Performed by: ORTHOPAEDIC SURGERY

## 2021-03-26 PROCEDURE — 25010000002 PROPOFOL 10 MG/ML EMULSION: Performed by: NURSE ANESTHETIST, CERTIFIED REGISTERED

## 2021-03-26 PROCEDURE — 76942 ECHO GUIDE FOR BIOPSY: CPT | Performed by: ORTHOPAEDIC SURGERY

## 2021-03-26 PROCEDURE — 73560 X-RAY EXAM OF KNEE 1 OR 2: CPT

## 2021-03-26 PROCEDURE — 25010000002 ROPIVACAINE PER 1 MG: Performed by: ANESTHESIOLOGY

## 2021-03-26 PROCEDURE — 25010000002 DEXAMETHASONE PER 1 MG: Performed by: NURSE ANESTHETIST, CERTIFIED REGISTERED

## 2021-03-26 PROCEDURE — 63710000001 OXYCODONE-ACETAMINOPHEN 7.5-325 MG TABLET: Performed by: ORTHOPAEDIC SURGERY

## 2021-03-26 PROCEDURE — 25010000002 ROPIVACAINE PER 1 MG: Performed by: ORTHOPAEDIC SURGERY

## 2021-03-26 PROCEDURE — C9290 INJ, BUPIVACAINE LIPOSOME: HCPCS | Performed by: ORTHOPAEDIC SURGERY

## 2021-03-26 PROCEDURE — 25010000002 FENTANYL CITRATE (PF) 100 MCG/2ML SOLUTION: Performed by: ANESTHESIOLOGY

## 2021-03-26 DEVICE — KNOTLESS TISSUE CONTROL DEVICE, UNDYED UNIDIRECTIONAL (ANTIBACTERIAL) SYNTHETIC ABSORBABLE DEVICE
Type: IMPLANTABLE DEVICE | Site: KNEE | Status: FUNCTIONAL
Brand: STRATAFIX

## 2021-03-26 DEVICE — CMT BONE REFOBACIN R W/GENT 1X40: Type: IMPLANTABLE DEVICE | Site: KNEE | Status: FUNCTIONAL

## 2021-03-26 DEVICE — PAT KN PERSONA VE CRS/LNK CMT 9.5X38MM: Type: IMPLANTABLE DEVICE | Site: KNEE | Status: FUNCTIONAL

## 2021-03-26 DEVICE — STEM TIB/KN PERSONA CMT 5D SZG RT: Type: IMPLANTABLE DEVICE | Site: KNEE | Status: FUNCTIONAL

## 2021-03-26 DEVICE — CAP TOTL KN CMT PRIMARY: Type: IMPLANTABLE DEVICE | Site: KNEE | Status: FUNCTIONAL

## 2021-03-26 DEVICE — COMP FEM/KN PERSONA CR CMT NRW SZ11 RT: Type: IMPLANTABLE DEVICE | Site: KNEE | Status: FUNCTIONAL

## 2021-03-26 DEVICE — EXT STEM FEM/KN PERSONA TPR 14XPLS30MM: Type: IMPLANTABLE DEVICE | Site: KNEE | Status: FUNCTIONAL

## 2021-03-26 DEVICE — KNOTLESS TISSUE CONTROL DEVICE, VIOLET UNIDIRECTIONAL (ANTIBACTERIAL) SYNTHETIC ABSORBABLE DEVICE
Type: IMPLANTABLE DEVICE | Site: KNEE | Status: FUNCTIONAL
Brand: STRATAFIX

## 2021-03-26 DEVICE — CAP EXT STEM KN UPCHRG: Type: IMPLANTABLE DEVICE | Site: KNEE | Status: FUNCTIONAL

## 2021-03-26 DEVICE — ART/SRF KN PERSONA/VE MC GH 8TO11 10MM RT: Type: IMPLANTABLE DEVICE | Site: KNEE | Status: FUNCTIONAL

## 2021-03-26 RX ORDER — LIDOCAINE HYDROCHLORIDE 40 MG/ML
SOLUTION TOPICAL AS NEEDED
Status: DISCONTINUED | OUTPATIENT
Start: 2021-03-26 | End: 2021-03-26 | Stop reason: SURG

## 2021-03-26 RX ORDER — OXYCODONE AND ACETAMINOPHEN 7.5; 325 MG/1; MG/1
1 TABLET ORAL ONCE AS NEEDED
Status: DISCONTINUED | OUTPATIENT
Start: 2021-03-26 | End: 2021-03-26 | Stop reason: HOSPADM

## 2021-03-26 RX ORDER — FAMOTIDINE 10 MG/ML
20 INJECTION, SOLUTION INTRAVENOUS ONCE
Status: COMPLETED | OUTPATIENT
Start: 2021-03-26 | End: 2021-03-26

## 2021-03-26 RX ORDER — HYDROCODONE BITARTRATE AND ACETAMINOPHEN 7.5; 325 MG/1; MG/1
1 TABLET ORAL ONCE AS NEEDED
Status: DISCONTINUED | OUTPATIENT
Start: 2021-03-26 | End: 2021-03-26 | Stop reason: HOSPADM

## 2021-03-26 RX ORDER — LABETALOL HYDROCHLORIDE 5 MG/ML
5 INJECTION, SOLUTION INTRAVENOUS
Status: DISCONTINUED | OUTPATIENT
Start: 2021-03-26 | End: 2021-03-26 | Stop reason: HOSPADM

## 2021-03-26 RX ORDER — EPHEDRINE SULFATE 50 MG/ML
INJECTION, SOLUTION INTRAVENOUS AS NEEDED
Status: DISCONTINUED | OUTPATIENT
Start: 2021-03-26 | End: 2021-03-26 | Stop reason: SURG

## 2021-03-26 RX ORDER — ONDANSETRON 4 MG/1
4 TABLET, FILM COATED ORAL EVERY 6 HOURS PRN
Qty: 20 TABLET | Refills: 0 | Status: SHIPPED | OUTPATIENT
Start: 2021-03-26 | End: 2021-03-26 | Stop reason: SDUPTHER

## 2021-03-26 RX ORDER — SODIUM CHLORIDE, SODIUM LACTATE, POTASSIUM CHLORIDE, CALCIUM CHLORIDE 600; 310; 30; 20 MG/100ML; MG/100ML; MG/100ML; MG/100ML
INJECTION, SOLUTION INTRAVENOUS CONTINUOUS PRN
Status: DISCONTINUED | OUTPATIENT
Start: 2021-03-26 | End: 2021-03-26 | Stop reason: SURG

## 2021-03-26 RX ORDER — OXYCODONE AND ACETAMINOPHEN 7.5; 325 MG/1; MG/1
TABLET ORAL
Qty: 50 TABLET | Refills: 0 | Status: SHIPPED | OUTPATIENT
Start: 2021-03-26 | End: 2021-07-28

## 2021-03-26 RX ORDER — PROPOFOL 10 MG/ML
VIAL (ML) INTRAVENOUS AS NEEDED
Status: DISCONTINUED | OUTPATIENT
Start: 2021-03-26 | End: 2021-03-26 | Stop reason: SURG

## 2021-03-26 RX ORDER — LIDOCAINE HYDROCHLORIDE 20 MG/ML
INJECTION, SOLUTION INFILTRATION; PERINEURAL AS NEEDED
Status: DISCONTINUED | OUTPATIENT
Start: 2021-03-26 | End: 2021-03-26 | Stop reason: SURG

## 2021-03-26 RX ORDER — ONDANSETRON 2 MG/ML
4 INJECTION INTRAMUSCULAR; INTRAVENOUS ONCE AS NEEDED
Status: DISCONTINUED | OUTPATIENT
Start: 2021-03-26 | End: 2021-03-26 | Stop reason: HOSPADM

## 2021-03-26 RX ORDER — EPHEDRINE SULFATE 50 MG/ML
5 INJECTION, SOLUTION INTRAVENOUS ONCE AS NEEDED
Status: DISCONTINUED | OUTPATIENT
Start: 2021-03-26 | End: 2021-03-26 | Stop reason: HOSPADM

## 2021-03-26 RX ORDER — DEXAMETHASONE SODIUM PHOSPHATE 10 MG/ML
INJECTION INTRAMUSCULAR; INTRAVENOUS AS NEEDED
Status: DISCONTINUED | OUTPATIENT
Start: 2021-03-26 | End: 2021-03-26 | Stop reason: SURG

## 2021-03-26 RX ORDER — NALOXONE HCL 0.4 MG/ML
0.2 VIAL (ML) INJECTION AS NEEDED
Status: DISCONTINUED | OUTPATIENT
Start: 2021-03-26 | End: 2021-03-26 | Stop reason: HOSPADM

## 2021-03-26 RX ORDER — LIDOCAINE HYDROCHLORIDE 10 MG/ML
0.5 INJECTION, SOLUTION EPIDURAL; INFILTRATION; INTRACAUDAL; PERINEURAL ONCE AS NEEDED
Status: DISCONTINUED | OUTPATIENT
Start: 2021-03-26 | End: 2021-03-26 | Stop reason: HOSPADM

## 2021-03-26 RX ORDER — MELOXICAM 15 MG/1
15 TABLET ORAL ONCE
Status: DISCONTINUED | OUTPATIENT
Start: 2021-03-26 | End: 2021-03-26 | Stop reason: HOSPADM

## 2021-03-26 RX ORDER — SUCCINYLCHOLINE CHLORIDE 20 MG/ML
INJECTION INTRAMUSCULAR; INTRAVENOUS AS NEEDED
Status: DISCONTINUED | OUTPATIENT
Start: 2021-03-26 | End: 2021-03-26 | Stop reason: SURG

## 2021-03-26 RX ORDER — CEFAZOLIN SODIUM 2 G/100ML
2 INJECTION, SOLUTION INTRAVENOUS ONCE
Status: COMPLETED | OUTPATIENT
Start: 2021-03-26 | End: 2021-03-26

## 2021-03-26 RX ORDER — ONDANSETRON 2 MG/ML
INJECTION INTRAMUSCULAR; INTRAVENOUS AS NEEDED
Status: DISCONTINUED | OUTPATIENT
Start: 2021-03-26 | End: 2021-03-26 | Stop reason: SURG

## 2021-03-26 RX ORDER — DOCUSATE SODIUM 100 MG/1
100 CAPSULE, LIQUID FILLED ORAL 2 TIMES DAILY
Qty: 60 CAPSULE | Refills: 0 | Status: SHIPPED | OUTPATIENT
Start: 2021-03-26 | End: 2021-03-26 | Stop reason: SDUPTHER

## 2021-03-26 RX ORDER — MAGNESIUM HYDROXIDE 1200 MG/15ML
LIQUID ORAL AS NEEDED
Status: DISCONTINUED | OUTPATIENT
Start: 2021-03-26 | End: 2021-03-26 | Stop reason: HOSPADM

## 2021-03-26 RX ORDER — MIDAZOLAM HYDROCHLORIDE 1 MG/ML
2 INJECTION INTRAMUSCULAR; INTRAVENOUS
Status: COMPLETED | OUTPATIENT
Start: 2021-03-26 | End: 2021-03-26

## 2021-03-26 RX ORDER — PREGABALIN 75 MG/1
150 CAPSULE ORAL ONCE
Status: COMPLETED | OUTPATIENT
Start: 2021-03-26 | End: 2021-03-26

## 2021-03-26 RX ORDER — FENTANYL CITRATE 50 UG/ML
50 INJECTION, SOLUTION INTRAMUSCULAR; INTRAVENOUS
Status: DISCONTINUED | OUTPATIENT
Start: 2021-03-26 | End: 2021-03-26 | Stop reason: HOSPADM

## 2021-03-26 RX ORDER — ONDANSETRON 4 MG/1
4 TABLET, FILM COATED ORAL ONCE AS NEEDED
Status: DISCONTINUED | OUTPATIENT
Start: 2021-03-26 | End: 2021-03-26 | Stop reason: HOSPADM

## 2021-03-26 RX ORDER — ONDANSETRON 4 MG/1
4 TABLET, FILM COATED ORAL EVERY 6 HOURS PRN
Qty: 20 TABLET | Refills: 0 | Status: SHIPPED | OUTPATIENT
Start: 2021-03-26 | End: 2021-07-28

## 2021-03-26 RX ORDER — DOCUSATE SODIUM 100 MG/1
100 CAPSULE, LIQUID FILLED ORAL 2 TIMES DAILY
Qty: 60 CAPSULE | Refills: 0 | Status: SHIPPED | OUTPATIENT
Start: 2021-03-26 | End: 2021-07-28

## 2021-03-26 RX ORDER — SODIUM CHLORIDE, SODIUM LACTATE, POTASSIUM CHLORIDE, CALCIUM CHLORIDE 600; 310; 30; 20 MG/100ML; MG/100ML; MG/100ML; MG/100ML
9 INJECTION, SOLUTION INTRAVENOUS CONTINUOUS
Status: DISCONTINUED | OUTPATIENT
Start: 2021-03-26 | End: 2021-03-26 | Stop reason: HOSPADM

## 2021-03-26 RX ORDER — PROMETHAZINE HYDROCHLORIDE 25 MG/1
25 SUPPOSITORY RECTAL ONCE AS NEEDED
Status: DISCONTINUED | OUTPATIENT
Start: 2021-03-26 | End: 2021-03-26 | Stop reason: HOSPADM

## 2021-03-26 RX ORDER — ROPIVACAINE HYDROCHLORIDE 5 MG/ML
INJECTION, SOLUTION EPIDURAL; INFILTRATION; PERINEURAL AS NEEDED
Status: DISCONTINUED | OUTPATIENT
Start: 2021-03-26 | End: 2021-03-26 | Stop reason: HOSPADM

## 2021-03-26 RX ORDER — PROMETHAZINE HYDROCHLORIDE 25 MG/1
25 TABLET ORAL ONCE AS NEEDED
Status: DISCONTINUED | OUTPATIENT
Start: 2021-03-26 | End: 2021-03-26 | Stop reason: HOSPADM

## 2021-03-26 RX ORDER — DOCUSATE SODIUM 100 MG/1
100 CAPSULE, LIQUID FILLED ORAL ONCE
Status: DISCONTINUED | OUTPATIENT
Start: 2021-03-26 | End: 2021-03-26 | Stop reason: HOSPADM

## 2021-03-26 RX ORDER — GLYCOPYRROLATE 0.2 MG/ML
INJECTION INTRAMUSCULAR; INTRAVENOUS AS NEEDED
Status: DISCONTINUED | OUTPATIENT
Start: 2021-03-26 | End: 2021-03-26 | Stop reason: SURG

## 2021-03-26 RX ORDER — ACETAMINOPHEN 325 MG/1
1000 TABLET ORAL ONCE
Status: COMPLETED | OUTPATIENT
Start: 2021-03-26 | End: 2021-03-26

## 2021-03-26 RX ORDER — SODIUM CHLORIDE 0.9 % (FLUSH) 0.9 %
3-10 SYRINGE (ML) INJECTION AS NEEDED
Status: DISCONTINUED | OUTPATIENT
Start: 2021-03-26 | End: 2021-03-26 | Stop reason: HOSPADM

## 2021-03-26 RX ORDER — MIDAZOLAM HYDROCHLORIDE 1 MG/ML
1 INJECTION INTRAMUSCULAR; INTRAVENOUS
Status: COMPLETED | OUTPATIENT
Start: 2021-03-26 | End: 2021-03-26

## 2021-03-26 RX ORDER — SODIUM CHLORIDE 0.9 % (FLUSH) 0.9 %
3 SYRINGE (ML) INJECTION EVERY 12 HOURS SCHEDULED
Status: DISCONTINUED | OUTPATIENT
Start: 2021-03-26 | End: 2021-03-26 | Stop reason: HOSPADM

## 2021-03-26 RX ORDER — NEOSTIGMINE METHYLSULFATE 0.5 MG/ML
INJECTION, SOLUTION INTRAVENOUS AS NEEDED
Status: DISCONTINUED | OUTPATIENT
Start: 2021-03-26 | End: 2021-03-26 | Stop reason: SURG

## 2021-03-26 RX ORDER — ROPIVACAINE HYDROCHLORIDE 5 MG/ML
INJECTION, SOLUTION EPIDURAL; INFILTRATION; PERINEURAL
Status: COMPLETED | OUTPATIENT
Start: 2021-03-26 | End: 2021-03-26

## 2021-03-26 RX ORDER — DIPHENHYDRAMINE HYDROCHLORIDE 50 MG/ML
12.5 INJECTION INTRAMUSCULAR; INTRAVENOUS
Status: DISCONTINUED | OUTPATIENT
Start: 2021-03-26 | End: 2021-03-26 | Stop reason: HOSPADM

## 2021-03-26 RX ORDER — FLUMAZENIL 0.1 MG/ML
0.2 INJECTION INTRAVENOUS AS NEEDED
Status: DISCONTINUED | OUTPATIENT
Start: 2021-03-26 | End: 2021-03-26 | Stop reason: HOSPADM

## 2021-03-26 RX ORDER — HYDROMORPHONE HYDROCHLORIDE 1 MG/ML
0.5 INJECTION, SOLUTION INTRAMUSCULAR; INTRAVENOUS; SUBCUTANEOUS
Status: DISCONTINUED | OUTPATIENT
Start: 2021-03-26 | End: 2021-03-26 | Stop reason: HOSPADM

## 2021-03-26 RX ORDER — DIPHENHYDRAMINE HCL 25 MG
25 CAPSULE ORAL
Status: DISCONTINUED | OUTPATIENT
Start: 2021-03-26 | End: 2021-03-26 | Stop reason: HOSPADM

## 2021-03-26 RX ORDER — FENTANYL CITRATE 50 UG/ML
INJECTION, SOLUTION INTRAMUSCULAR; INTRAVENOUS AS NEEDED
Status: DISCONTINUED | OUTPATIENT
Start: 2021-03-26 | End: 2021-03-26 | Stop reason: SURG

## 2021-03-26 RX ORDER — ROCURONIUM BROMIDE 10 MG/ML
INJECTION, SOLUTION INTRAVENOUS AS NEEDED
Status: DISCONTINUED | OUTPATIENT
Start: 2021-03-26 | End: 2021-03-26 | Stop reason: SURG

## 2021-03-26 RX ADMIN — LIDOCAINE HYDROCHLORIDE 1 EACH: 40 SOLUTION TOPICAL at 10:00

## 2021-03-26 RX ADMIN — SODIUM CHLORIDE, POTASSIUM CHLORIDE, SODIUM LACTATE AND CALCIUM CHLORIDE: 600; 310; 30; 20 INJECTION, SOLUTION INTRAVENOUS at 10:59

## 2021-03-26 RX ADMIN — CEFAZOLIN SODIUM 2 G: 2 INJECTION, SOLUTION INTRAVENOUS at 09:44

## 2021-03-26 RX ADMIN — ROCURONIUM BROMIDE 10 MG: 50 INJECTION INTRAVENOUS at 09:59

## 2021-03-26 RX ADMIN — FENTANYL CITRATE 50 MCG: 50 INJECTION INTRAMUSCULAR; INTRAVENOUS at 09:57

## 2021-03-26 RX ADMIN — FAMOTIDINE 20 MG: 10 INJECTION INTRAVENOUS at 08:10

## 2021-03-26 RX ADMIN — LIDOCAINE HYDROCHLORIDE 80 MG: 20 INJECTION, SOLUTION INFILTRATION; PERINEURAL at 09:59

## 2021-03-26 RX ADMIN — EPHEDRINE SULFATE 20 MG: 50 INJECTION INTRAVENOUS at 10:10

## 2021-03-26 RX ADMIN — ROCURONIUM BROMIDE 30 MG: 50 INJECTION INTRAVENOUS at 10:25

## 2021-03-26 RX ADMIN — EPHEDRINE SULFATE 10 MG: 50 INJECTION INTRAVENOUS at 10:12

## 2021-03-26 RX ADMIN — MIDAZOLAM 1 MG: 1 INJECTION INTRAMUSCULAR; INTRAVENOUS at 08:10

## 2021-03-26 RX ADMIN — NEOSTIGMINE METHYLSULFATE 3 MG: 0.5 INJECTION INTRAVENOUS at 11:46

## 2021-03-26 RX ADMIN — DEXAMETHASONE SODIUM PHOSPHATE 8 MG: 10 INJECTION INTRAMUSCULAR; INTRAVENOUS at 10:03

## 2021-03-26 RX ADMIN — PREGABALIN 150 MG: 75 CAPSULE ORAL at 07:59

## 2021-03-26 RX ADMIN — SUCCINYLCHOLINE CHLORIDE 160 MG: 20 INJECTION, SOLUTION INTRAMUSCULAR; INTRAVENOUS; PARENTERAL at 09:59

## 2021-03-26 RX ADMIN — SODIUM CHLORIDE, POTASSIUM CHLORIDE, SODIUM LACTATE AND CALCIUM CHLORIDE: 600; 310; 30; 20 INJECTION, SOLUTION INTRAVENOUS at 09:18

## 2021-03-26 RX ADMIN — FENTANYL CITRATE 50 MCG: 50 INJECTION, SOLUTION INTRAMUSCULAR; INTRAVENOUS at 08:29

## 2021-03-26 RX ADMIN — MIDAZOLAM 1 MG: 1 INJECTION INTRAMUSCULAR; INTRAVENOUS at 08:29

## 2021-03-26 RX ADMIN — SODIUM CHLORIDE, POTASSIUM CHLORIDE, SODIUM LACTATE AND CALCIUM CHLORIDE 9 ML/HR: 600; 310; 30; 20 INJECTION, SOLUTION INTRAVENOUS at 07:59

## 2021-03-26 RX ADMIN — GLYCOPYRROLATE 0.6 MG: 0.2 INJECTION INTRAMUSCULAR; INTRAVENOUS at 11:46

## 2021-03-26 RX ADMIN — HYDROMORPHONE HYDROCHLORIDE 0.5 MG: 1 INJECTION, SOLUTION INTRAMUSCULAR; INTRAVENOUS; SUBCUTANEOUS at 12:12

## 2021-03-26 RX ADMIN — FENTANYL CITRATE 50 MCG: 50 INJECTION, SOLUTION INTRAMUSCULAR; INTRAVENOUS at 12:09

## 2021-03-26 RX ADMIN — ROPIVACAINE HYDROCHLORIDE 30 ML: 5 INJECTION, SOLUTION EPIDURAL; INFILTRATION; PERINEURAL at 08:36

## 2021-03-26 RX ADMIN — OXYCODONE HYDROCHLORIDE AND ACETAMINOPHEN 1 TABLET: 7.5; 325 TABLET ORAL at 14:17

## 2021-03-26 RX ADMIN — PROPOFOL 150 MG: 10 INJECTION, EMULSION INTRAVENOUS at 09:59

## 2021-03-26 RX ADMIN — FENTANYL CITRATE 50 MCG: 50 INJECTION INTRAMUSCULAR; INTRAVENOUS at 09:59

## 2021-03-26 RX ADMIN — ONDANSETRON 4 MG: 2 INJECTION INTRAMUSCULAR; INTRAVENOUS at 11:40

## 2021-03-26 RX ADMIN — GLYCOPYRROLATE 0.2 MG: 0.2 INJECTION INTRAMUSCULAR; INTRAVENOUS at 10:14

## 2021-03-26 RX ADMIN — ACETAMINOPHEN 975 MG: 325 TABLET ORAL at 07:59

## 2021-03-26 NOTE — ANESTHESIA PROCEDURE NOTES
Airway  Urgency: elective    Date/Time: 3/26/2021 10:00 AM  Airway not difficult    General Information and Staff    Patient location during procedure: OR  Anesthesiologist: Jameson Salmeron MD  CRNA: Jeyson Hilliard CRNA    Indications and Patient Condition  Indications for airway management: airway protection    Preoxygenated: yes  MILS not maintained throughout  Mask difficulty assessment: 1 - vent by mask    Final Airway Details  Final airway type: endotracheal airway      Successful airway: ETT  Cuffed: yes   Successful intubation technique: direct laryngoscopy  Facilitating devices/methods: anterior pressure/BURP  Endotracheal tube insertion site: oral  Blade: Es  Blade size: 3  ETT size (mm): 7.5  Cormack-Lehane Classification: grade I - full view of glottis  Placement verified by: chest auscultation and capnometry   Cuff volume (mL): 8  Measured from: lips  ETT/EBT  to lips (cm): 21  Number of attempts at approach: 1  Assessment: lips, teeth, and gum same as pre-op and atraumatic intubation    Additional Comments  Pre O2, SIAI

## 2021-03-26 NOTE — ANESTHESIA POSTPROCEDURE EVALUATION
"Patient: Jeyson Rose    Procedure Summary     Date: 03/26/21 Room / Location: [facility] OR [address] [place] [address] MAIN OR    Anesthesia Start: 0950 Anesthesia Stop: 1202    Procedure: TOTAL KNEE ARTHROPLASTY (Right Knee) Diagnosis:       Primary osteoarthritis of both knees      (Primary osteoarthritis of both knees [M17.0])    Surgeons: Song Marsh MD Provider: Jameson Salmeron MD    Anesthesia Type: general with block ASA Status: 3          Anesthesia Type: general with block    Vitals  Vitals Value Taken Time   /117 03/26/21 1245   Temp 36.6 °C (97.8 °F) 03/26/21 1201   Pulse 60 03/26/21 1247   Resp 16 03/26/21 1230   SpO2 96 % 03/26/21 1247   Vitals shown include unvalidated device data.        Post Anesthesia Care and Evaluation    Patient location during evaluation: bedside  Patient participation: complete - patient participated  Level of consciousness: sleepy but conscious  Pain score: 0  Pain management: adequate  Airway patency: patent  Anesthetic complications: No anesthetic complications    Cardiovascular status: acceptable  Respiratory status: acceptable  Hydration status: acceptable    Comments: /78   Pulse 66   Temp 36.6 °C (97.8 °F) (Oral)   Resp 16   Ht 171.5 cm (67.5\")   Wt 102 kg (225 lb 15.5 oz)   SpO2 98%   BMI 34.87 kg/m²         "

## 2021-03-26 NOTE — ANESTHESIA PREPROCEDURE EVALUATION
Anesthesia Evaluation     Patient summary reviewed and Nursing notes reviewed   history of anesthetic complications:  NPO Solid Status: > 8 hours  NPO Liquid Status: > 8 hours           Airway   Mallampati: II  TM distance: >3 FB  Neck ROM: full  No difficulty expected and Possible difficult intubation  Comment: Airway looks good but has hist of chemo and radiation   Dental          Pulmonary    (+) a smoker Former Abstained day of surgery,   (-) COPD, sleep apnea, rhonchi, decreased breath sounds, wheezes  Cardiovascular   Exercise tolerance: good (4-7 METS)    Rhythm: regular  Rate: normal    (+) hypertension,   (-) CAD, angina, BERNAL, murmur    ROS comment: Heart cath 4 years ago negative for blockages     Neuro/Psych  (+) weakness,     (-) seizures, CVA  GI/Hepatic/Renal/Endo    (-) liver disease, no renal disease, diabetes, no thyroid disorder    Musculoskeletal     Abdominal     Abdomen: soft.   Substance History      OB/GYN          Other   arthritis,    history of cancer (neck cancer s/p surgery chemo and radiation ) remission                    Anesthesia Plan    ASA 3     general with block   (Right adductor canal )  intravenous induction     Anesthetic plan, all risks, benefits, and alternatives have been provided, discussed and informed consent has been obtained with: patient.

## 2021-03-26 NOTE — ANESTHESIA PROCEDURE NOTES
Peripheral Block      Patient reassessed immediately prior to procedure    Patient location during procedure: holding area  Start time: 3/26/2021 8:31 AM  Stop time: 3/26/2021 8:37 AM  Reason for block: procedure for pain, at surgeon's request and post-op pain management  Performed by  Anesthesiologist: Steven Ghotra MD  Preanesthetic Checklist  Completed: patient identified, IV checked, site marked, risks and benefits discussed, surgical consent, monitors and equipment checked, pre-op evaluation and timeout performed  Prep:  Pt Position: supine  Sterile barriers:cap, gloves, sterile barriers and mask  Prep: ChloraPrep  Patient monitoring: blood pressure monitoring, continuous pulse oximetry and EKG  Procedure  Sedation:yes  Performed under: local infiltration  Guidance:ultrasound guided  ULTRASOUND INTERPRETATION.  Using ultrasound guidance a 21 G gauge needle was placed in close proximity to the femoral nerve, at which point, under ultrasound guidance anesthetic was injected in the area of the nerve and spread of the anesthesia was seen on ultrasound in close proximity thereto.  There were no abnormalities seen on ultrasound; a digital image was taken; and the patient tolerated the procedure with no complications. Images:still images obtained    Laterality:right  Block Type:adductor canal block  Injection Technique:single-shot  Needle Type:echogenic  Needle Gauge:21 G  Resistance on Injection: none    Medications Used: ropivacaine (NAROPIN) 0.5 % injection, 30 mL  Med admintered at 3/26/2021 8:36 AM      Post Assessment  Injection Assessment: negative aspiration for heme, no paresthesia on injection and incremental injection  Patient Tolerance:comfortable throughout block  Complications:no

## 2021-03-29 ENCOUNTER — TELEPHONE (OUTPATIENT)
Dept: ORTHOPEDIC SURGERY | Facility: CLINIC | Age: 61
End: 2021-03-29

## 2021-03-29 DIAGNOSIS — Z96.651 S/P TKR (TOTAL KNEE REPLACEMENT), RIGHT: Primary | ICD-10-CM

## 2021-03-29 NOTE — TELEPHONE ENCOUNTER
Caller: ALBAROHOLLY AYALAT    Relationship: WIFE    Best call back number: [phone]    What orders are you requesting (i.e. lab or imaging): PHYSICAL THERAPY ORDER FOR RIGHT KNEE      Where will you receive your lab/imaging services: [facility]    Additional notes: PATIENT WILL BE DOING IN PERSON PHYSICAL THERAPY AND WOULD LIKE PHYSICAL THERAPY ORDER TO BE FAXED TODAY TO:    [facility]  PHONE: [phone]  FAX: [phone]  ATTN: MIGUELANGEL

## 2021-04-01 ENCOUNTER — TELEPHONE (OUTPATIENT)
Dept: ORTHOPEDIC SURGERY | Facility: HOSPITAL | Age: 61
End: 2021-04-01

## 2021-04-01 NOTE — TELEPHONE ENCOUNTER
Post op day 6  Discharge Instructions:  Ask patient about his or her discharge instructions  ?  Patient confirmed understanding   ?  Further instruction needed   What, if any, recommendations, teaching, or interventions did you provide? Click or tap here to enter text.  Health status:  Pain controlled Yes   Recommended interventions:  Choose an item.  Incision/dressing status   ?  Clean without redness, drainage, odor  ?  Redness    ?  Drainage - color Click or tap here to enter text.  ?  Odor  PATO - Green light blinking Choose an item.  Difficulties urination No  Last BM 3/31/2021 (if no BM by day 3-recommend OTC suppository or fleets enema)  Medications:  ?Medications reviewed with patient/family/caregiver  Patient taking medications as prescribed?   Yes  If not taking medications as prescribed, note specific medicine(s) and reason for each:  Click or tap here to enter text.  Hospital Follow Up Plan:  Follow up Appointment with Orthopedic surgeon:  ?Has f/u appointment                ?Scheduled f/u appointment  Home Care ordered at discharge?    Yes        Home Care started, or contact made?    No   If no, action taken: pt states that he is to start PT on Monday. Explained to pt the importance of continuing the exercises that was taught by PT in the hospital as well as getting up and moving every couple hours. Verbalized understanding.   DME obtained/used in home?         Yes   Other information:  No other issues to discuss at this time. My contact information was given to the patient. Verbalized understanding.

## 2021-04-02 DIAGNOSIS — Z96.651 S/P TKR (TOTAL KNEE REPLACEMENT), RIGHT: Primary | ICD-10-CM

## 2021-04-05 ENCOUNTER — OFFICE VISIT (OUTPATIENT)
Dept: ORTHOPEDIC SURGERY | Facility: CLINIC | Age: 61
End: 2021-04-05

## 2021-04-05 ENCOUNTER — HOSPITAL ENCOUNTER (OUTPATIENT)
Dept: OTHER | Facility: HOSPITAL | Age: 61
Discharge: HOME | End: 2021-04-05
Attending: ORTHOPAEDIC SURGERY

## 2021-04-05 VITALS — HEIGHT: 68 IN | BODY MASS INDEX: 34.1 KG/M2 | WEIGHT: 225 LBS

## 2021-04-05 DIAGNOSIS — Z96.651 S/P TKR (TOTAL KNEE REPLACEMENT), RIGHT: Primary | ICD-10-CM

## 2021-04-05 PROCEDURE — 99024 POSTOP FOLLOW-UP VISIT: CPT | Performed by: PHYSICIAN ASSISTANT

## 2021-04-05 NOTE — PROGRESS NOTES
POST OPERATIVE FOLLOW UP ([facility])      NAME: Jeyson Rose           : 1960    MRN: [id number]      Chief Complaint   Patient presents with   • Right Knee - Post-op, Fall, Edema     Date of Surgery: 3/26/21  ?     HPI  Jeyson Rose presents for 10 day postoperative follow up s/p right total knee arthroplasty performed by Song Marsh MD. The patient has had a relatively normal postoperative course.  The patient has had no current complaints. The patient has had improving normal postoperative pain.  The patient has had no issues with the wound. He reports a fall, 21, where he fell onto the left knee and put more pressure into the right knee although he states he did not fall onto the knee. He reports no pain but has had moderate swelling since date of surgery.        Physical Exam  General: alert, appears stated age, cooperative and no distress  Incision/Skin: healing well, no drainage, no erythema, no seroma, no swelling, incision well approximated  Musculoskeletal:   Calf is soft and nontender with a negative Homans sign. Appropriate amounts of swelling and bruising are noted.  There is no clicking, popping or catching. There is no instability of the components.   Anterior and posterior drawer signs are negative.   Dorsalis pedis and posterior tibial artery pulses are palpable.   Common peroneal nerve function is well preserved.   Range of motion is 10-75 degrees of flexion.  Gait is cautious but otherwise fairly normal.       Diagnostic Data:  Right knee xrays 2 views were ordered by Angel Russo PA-C. Performed at [facility] on 21. Images were independently viewed and interpreted by myself, my impression as follows:  Findings: Well positioned implant with good cement mantle  Bony Lesion: No  Soft tissues: increased  Joint spaces: WNL  Hardware appropriately positioned: yes  Prior studies available for comparison: yes          Assessment/Plan   Assessment:       1. S/P TKR (total knee replacement), right          Plan:   Orders Placed This Encounter   Procedures   • Compression Thigh High Stockings      • Wound care instructions given  • Ice and/or modalities as beneficial  • Watch for signs and symptoms of infection  • Elevate leg for residual swelling and decrease amount of activity to allow swelling to decrease.  • Physical therapy program ongoing  • Weight bearing parameters reviewed  • Take prescribed medications as instructed, but only as tolerated  • Aftercare and dental prophylaxis for joint replacement surgery.  • Discussion of orthopaedic goals and activities and patient and/or guardian expressed appreciation.  • Follow up as scheduled       Angel Russo PA-C  04/05/2021     Electronically signed by Angel Russo PA-C, 04/05/21, 3:20 PM EDT.    EMR Dragon/Transcription disclaimer:  Much of this encounter note is an electronic transcription/translation of spoken language to printed text. The electronic translation of spoken language may permit erroneous, or at times, nonsensical words or phrases to be inadvertently transcribed; Although I have reviewed the note for such errors, some may still exist.

## 2021-04-08 ENCOUNTER — OFFICE VISIT (OUTPATIENT)
Dept: ORTHOPEDIC SURGERY | Facility: CLINIC | Age: 61
End: 2021-04-08

## 2021-04-08 DIAGNOSIS — Z96.651 S/P TKR (TOTAL KNEE REPLACEMENT), RIGHT: Primary | ICD-10-CM

## 2021-04-08 PROCEDURE — 99024 POSTOP FOLLOW-UP VISIT: CPT | Performed by: ORTHOPAEDIC SURGERY

## 2021-04-08 NOTE — PROGRESS NOTES
POST OP TOTAL GLOBAL      NAME: Jeyson Rose           : 1960    MRN: [id number]    Chief Complaint   Patient presents with   • Right Knee - Post-op       Date of Surgery: 21  ?  HPI:   Patient returns today for 2 week follow up of right total knee arthroplasty Incision(s) healing nicely/as expected with no signs of infection. Patient reports doing well with no unusual complaints. No fevers, rigors or chills. Appears to be progressing appropriately. Patient is on appropriate anticoagulation.  His swelling is decreased considerably and his range of motion is improved quite a bit.  He has not used any pain medication for the past 10 days or so.  He is using a long Jobst compression garment stocking which is very helpful in managing his swelling and his symptoms as well.      Ortho Exam:   Right knee.The patient is status post total knee arthroplasty postoperative 2 week(s). Incision is clean. Calf is soft and nontender. Homans sign is negative. There is no clicking, popping or catching. Anterior and posterior drawer signs are negative.  There is no instability of the components. Appropriate amounts of swelling and bruising are noted. Dorsalis pedis and posterior tibial artery pulses are palpable. Common peroneal nerve function is well preserved. Range of motion is from 0-90 degrees of flexion. Gait is cautious but otherwise fairly normal. There is no evidence of a deep seated joint infection.      Diagnostic Studies:  Right knee xrays ap/lateral views were ordered by Song Marsh MD. Performed at [facility] on 21. Images were independently viewed and interpreted by myself, my impression as follows:    right Knee X-Ray  Indication: Evaluation of implant position after total knee arthroplasty.  AP, Lateral views  Findings: Well-placed implants with a good cement mantle without any subsidence of the implants  no bony lesion  Soft tissues within normal limits  within normal  limits joint spaces  Hardware appropriately positioned yes      yes prior studies available for comparison.    X-RAY was ordered and reviewed by Song Marsh MD        Assessment:  Diagnoses and all orders for this visit:    1. S/P TKR (total knee replacement), right (Primary)            Plan   · Incision care  · To use ACE wrap/MIRIAM stocking  · Continue ice to joint   · Stretching and strengthening exercises of the quads and the hamstrings with an outpatient physical therapy program designed for recuperation from knee replacement surgery.  · Aggressive ROM  · Falls precautions  · Once Xarelto is completed, change to an enteric coated Aspirin 325 mg daily until 6 weeks following your surgery  · Continue with lifelong antibiotic prophylaxis with dental procedures following total joint replacement.  · Follow up in 6 week(s)    Date of encounter: 04/08/2021   Song Marsh MD

## 2021-04-13 ENCOUNTER — TELEPHONE (OUTPATIENT)
Dept: ORTHOPEDIC SURGERY | Facility: HOSPITAL | Age: 61
End: 2021-04-13

## 2021-04-13 NOTE — TELEPHONE ENCOUNTER
Called and spoke with Mr. Rose today. Pt states that he is doing great. Having no issues with pain, incision site looks good. No swelling/bruising noted. No calf pain or tenderness. Still going to OP PT and progressing with each session. Mr. Rose has no concerns/issues to discuss at this time. My contact information was given to patient if any questions arise. Pt verbalized understanding.

## 2021-04-28 ENCOUNTER — CONVERSION ENCOUNTER (OUTPATIENT)
Dept: FAMILY MEDICINE CLINIC | Facility: CLINIC | Age: 61
End: 2021-04-28

## 2021-04-28 ENCOUNTER — OFFICE VISIT CONVERTED (OUTPATIENT)
Dept: FAMILY MEDICINE CLINIC | Facility: CLINIC | Age: 61
End: 2021-04-28
Attending: NURSE PRACTITIONER

## 2021-05-14 VITALS
HEART RATE: 61 BPM | SYSTOLIC BLOOD PRESSURE: 170 MMHG | DIASTOLIC BLOOD PRESSURE: 88 MMHG | TEMPERATURE: 97.9 F | BODY MASS INDEX: 35.35 KG/M2 | WEIGHT: 233.25 LBS | HEIGHT: 68 IN | OXYGEN SATURATION: 94 %

## 2021-05-14 NOTE — PROGRESS NOTES
Progress Note      Patient Name: Jeyson Rose   Patient ID: [id number]   Sex: Male   YOB: 1960        Visit Date: April 28, 2021    Provider: NANCY Palacios   Location: [facility]   Location Address: [address] [place] [address], [address] [place] [address]  [id number]   Location Phone: [phone]          Chief Complaint  · er f/u      History Of Present Illness  Jeyson Rose is a 60 year old male who presents for evaluation and treatment of:      Resents today for an ER follow-up for chest pain.  He went to the emergency room on 3/12/2021.  EKG CT scans were normal.  Troponins were negative.  Chest pain was not relieved with nitro nor GI cocktail.  Later when given morphine and Nexium symptoms improved.  He was discharged home with Nexium and Mylanta.  He has not had any other reflux symptoms since his ER visit.    His blood pressure today in office is elevated 170/88.  He sees nephrology and cardiology.  He reports last week at the nephrology office his blood pressure was low.  Instructed him to monitor his blood pressure at home if remains elevated greater than 140/82 follow-up with cardiology.  His nephrologist is Dr. Antoine and cardiologist Dr. Knight       Past Medical History  Arthritis; Cancer; Forgetfulness; Heart Attack; High blood pressure; Reflux Disease         Past Surgical History  Colonoscopy; Neck Surgery; Tonsilectomy         Medication List  amlodipine 5 mg oral tablet; carvedilol 12.5 mg oral tablet; gabapentin 300 mg oral capsule; losartan 100 mg oral tablet         Allergy List  NO KNOWN DRUG ALLERGIES         Family Medical History  Heart Attack (MI)         Social History  Alcohol (Current some day); Tobacco (Former)         Immunizations  Name Date Admin   Influenza 12/01/2019         Review of Systems  · Constitutional  o Denies  o : fatigue, night sweats  · Eyes  o Denies  o : double vision, blurred vision  · HENT  o Denies  o : vertigo, recent head  "injury  · Cardiovascular  o Denies  o : chest pain, irregular heart beats  · Respiratory  o Denies  o : shortness of breath, productive cough  · Gastrointestinal  o Admits  o : reflux  o Denies  o : nausea, vomiting, diarrhea, constipation, loss of appetite, abdominal pain, blood in stools  · Genitourinary  o Denies  o : urgency, frequency, dysuria, urinary retention  · Integument  o Denies  o : hair growth change, new skin lesions  · Neurologic  o Denies  o : altered mental status, seizures  · Musculoskeletal  o Denies  o : joint swelling, limitation of motion  · Endocrine  o Denies  o : cold intolerance, heat intolerance  · Heme-Lymph  o Denies  o : petechiae, lymph node enlargement or tenderness  · Allergic-Immunologic  o Denies  o : frequent illnesses      Vitals  Date Time BP Position Site L\R Cuff Size HR RR TEMP (F) WT  HT  BMI kg/m2 BSA m2 O2 Sat FR L/min FiO2        04/28/2021 09:21 /88 Sitting    61 - R  97.9 233lbs 4oz 5'  8\" 35.47 2.25 94 %            Physical Examination  · Constitutional  o Appearance  o : obese, alert, in no acute distress  · Head and Face  o Head  o :   § Inspection  § : atraumatic, normocephalic  o Face  o :   § Inspection  § : no facial lesions  o HEENT  o : Unremarkable  · Eyes  o Conjunctivae  o : conjunctivae normal  o Sclerae  o : sclerae white  o Pupils and Irises  o : pupils equal and round, pupils reactive to light bilaterally  o Eyelids/Ocular Adnexae  o : eyelid appearance normal  · Ears, Nose, Mouth and Throat  o Ears  o :   § External Ears  § : appearance within normal limits, no lesions present  o Nose  o :   § External Nose  § : appearance normal  o Oral Cavity  o :   § Oral Mucosa  § : oral mucosa normal  § Lips  § : lip appearance normal  § Teeth  § : normal dentition for age  § Gums  § : gums pink, non-swollen, no bleeding present  § Tongue  § : tongue appearance normal  § Palate  § : hard palate normal, soft palate appearance " normal  · Neck  o Inspection/Palpation  o : normal appearance, no masses or tenderness, trachea midline  o Thyroid  o : gland size normal, nontender, no nodules or masses present on palpation  · Respiratory  o Respiratory Effort  o : breathing unlabored  o Auscultation of Lungs  o : normal breath sounds  · Cardiovascular  o Heart  o :   § Auscultation of Heart  § : regular rate, normal rhythm, no murmurs present  o Peripheral Vascular System  o :   § Extremities  § : no edema  · Gastrointestinal  o Abdominal Examination  o : abdomen nontender to palpation, normal bowel sounds, tone normal without rigidity or guarding, no masses present, abdominal obesity present, abdomen scaphoid upon supine  o Liver and spleen  o : no hepatomegaly present  · Lymphatic  o Neck  o : no lymphadenopathy   o Supraclavicular Nodes  o : no supraclavicular nodes          Assessment  · Essential hypertension     401.9/I10  Decrease caffeine intake. Monitor blood pressure at home. He states he took blood pressure late this morning. Instructed him to monitor his blood pressure at home. If remains elevated to follow-up with cardiologist.  · GERD (gastroesophageal reflux disease)     530.81/K21.9  Refill Nexium 40 mg. Continue taking medication daily. If symptoms do not improve or become worse will consult gastroenterology for an EGD. He had a colonoscopy in 2018 that was within normal limits.      Plan  · Orders  o ACO-39: Current medications updated and reviewed (, 1159F) - - 04/28/2021  · Medications  o Nexium 40 mg oral capsule,delayed release(DR/EC)   SIG: take 1 capsule (40 mg) by oral route once daily   DISP: (30) Capsule with 2 refills  Prescribed on 04/28/2021     o Medications have been Reconciled  o Transition of Care or Provider Policy  · Instructions  o Patient advised to monitor blood pressure (B/P) at home and journal readings. Patient informed that a B/P reading at home of more than 130/80 is considered hypertension. For  readings greater [id number]/90 or higher patient is advised to follow up in the office with readings for management. Patient advised to limit sodium intake.  o Maintain a healthy weight. Avoid tight fitting clothes. Avoid fried, fatty foods, tomato sauce, chocolate, mint, garlic, onion, alcohol. caffeine. Eat smaller meals, dont lie down after a meal, dont smoke. Elevate the head of your bed 6-9 inches.  o Patient was educated/instructed on their diagnosis, treatment and medications prior to discharge from the clinic today.  o Patient instructed to seek medical attention urgently for new or worsening symptoms.  o Call the office with any concerns or questions.  · Disposition  o Call or Return if symptoms worsen or persist.  o f/u 3 months            Electronically Signed by: NANCY Palacios -Author on April 28, 2021 10:15:41 AM

## 2021-05-15 VITALS
TEMPERATURE: 98.1 F | DIASTOLIC BLOOD PRESSURE: 82 MMHG | SYSTOLIC BLOOD PRESSURE: 152 MMHG | OXYGEN SATURATION: 97 % | HEART RATE: 66 BPM | WEIGHT: 213.37 LBS | HEIGHT: 68 IN | BODY MASS INDEX: 32.34 KG/M2

## 2021-05-20 ENCOUNTER — OFFICE VISIT (OUTPATIENT)
Dept: ORTHOPEDIC SURGERY | Facility: CLINIC | Age: 61
End: 2021-05-20

## 2021-05-20 VITALS — HEIGHT: 68 IN | BODY MASS INDEX: 35.31 KG/M2 | WEIGHT: 233 LBS | TEMPERATURE: 97.4 F

## 2021-05-20 DIAGNOSIS — Z96.651 S/P TKR (TOTAL KNEE REPLACEMENT), RIGHT: Primary | ICD-10-CM

## 2021-05-20 PROCEDURE — 99024 POSTOP FOLLOW-UP VISIT: CPT | Performed by: ORTHOPAEDIC SURGERY

## 2021-05-20 RX ORDER — AMLODIPINE BESYLATE 10 MG/1
10 TABLET ORAL DAILY
COMMUNITY
Start: 2021-04-24 | End: 2021-10-28 | Stop reason: SDUPTHER

## 2021-05-20 RX ORDER — AMITRIPTYLINE HYDROCHLORIDE 25 MG/1
50 TABLET, FILM COATED ORAL NIGHTLY
COMMUNITY
Start: 2021-04-16

## 2021-05-20 RX ORDER — ESOMEPRAZOLE MAGNESIUM 40 MG/1
CAPSULE, DELAYED RELEASE ORAL
COMMUNITY
Start: 2021-04-28 | End: 2021-07-28 | Stop reason: SDUPTHER

## 2021-07-28 ENCOUNTER — OFFICE VISIT (OUTPATIENT)
Dept: FAMILY MEDICINE CLINIC | Facility: CLINIC | Age: 61
End: 2021-07-28

## 2021-07-28 VITALS
DIASTOLIC BLOOD PRESSURE: 62 MMHG | WEIGHT: 224.2 LBS | HEIGHT: 68 IN | HEART RATE: 62 BPM | TEMPERATURE: 97.4 F | SYSTOLIC BLOOD PRESSURE: 124 MMHG | BODY MASS INDEX: 33.98 KG/M2

## 2021-07-28 DIAGNOSIS — I25.10 CORONARY ARTERY DISEASE INVOLVING NATIVE CORONARY ARTERY OF NATIVE HEART WITHOUT ANGINA PECTORIS: ICD-10-CM

## 2021-07-28 DIAGNOSIS — I10 ESSENTIAL (PRIMARY) HYPERTENSION: ICD-10-CM

## 2021-07-28 DIAGNOSIS — G62.0 PERIPHERAL NEUROPATHY DUE TO CHEMOTHERAPY (HCC): ICD-10-CM

## 2021-07-28 DIAGNOSIS — E66.9 OBESITY (BMI 30.0-34.9): ICD-10-CM

## 2021-07-28 DIAGNOSIS — Z00.00 MEDICARE ANNUAL WELLNESS VISIT, SUBSEQUENT: Primary | ICD-10-CM

## 2021-07-28 DIAGNOSIS — C76.0 PRIMARY SQUAMOUS CELL CARCINOMA OF HEAD AND NECK (HCC): ICD-10-CM

## 2021-07-28 DIAGNOSIS — T45.1X5A PERIPHERAL NEUROPATHY DUE TO CHEMOTHERAPY (HCC): ICD-10-CM

## 2021-07-28 DIAGNOSIS — K21.9 GASTROESOPHAGEAL REFLUX DISEASE WITHOUT ESOPHAGITIS: ICD-10-CM

## 2021-07-28 PROBLEM — G60.9 IDIOPATHIC PERIPHERAL NEUROPATHY: Status: ACTIVE | Noted: 2017-10-26

## 2021-07-28 PROBLEM — M19.90 ARTHRITIS: Status: ACTIVE | Noted: 2021-07-28

## 2021-07-28 PROBLEM — E66.811 OBESITY (BMI 30.0-34.9): Status: ACTIVE | Noted: 2021-07-28

## 2021-07-28 PROBLEM — I51.7 LVH (LEFT VENTRICULAR HYPERTROPHY): Status: ACTIVE | Noted: 2021-07-28

## 2021-07-28 PROCEDURE — 96160 PT-FOCUSED HLTH RISK ASSMT: CPT | Performed by: NURSE PRACTITIONER

## 2021-07-28 PROCEDURE — G0439 PPPS, SUBSEQ VISIT: HCPCS | Performed by: NURSE PRACTITIONER

## 2021-07-28 PROCEDURE — 1170F FXNL STATUS ASSESSED: CPT | Performed by: NURSE PRACTITIONER

## 2021-07-28 PROCEDURE — 1125F AMNT PAIN NOTED PAIN PRSNT: CPT | Performed by: NURSE PRACTITIONER

## 2021-07-28 PROCEDURE — 1159F MED LIST DOCD IN RCRD: CPT | Performed by: NURSE PRACTITIONER

## 2021-07-28 RX ORDER — PRAMIPEXOLE DIHYDROCHLORIDE 0.25 MG/1
0.25 TABLET ORAL NIGHTLY
COMMUNITY
End: 2021-10-28 | Stop reason: SDUPTHER

## 2021-07-28 RX ORDER — ESOMEPRAZOLE MAGNESIUM 40 MG/1
40 CAPSULE, DELAYED RELEASE ORAL
Qty: 90 CAPSULE | Refills: 1 | Status: SHIPPED | OUTPATIENT
Start: 2021-07-28 | End: 2021-10-28 | Stop reason: SDUPTHER

## 2021-07-28 NOTE — ASSESSMENT & PLAN NOTE
Coronary artery disease is stable.  Continue current treatment regimen.  Weight loss.  Regular aerobic exercise.  Cardiac status will be reassessed in 3 months.

## 2021-07-28 NOTE — ASSESSMENT & PLAN NOTE
Patient's (Body mass index is 34.09 kg/m².) indicates that they are obese (BMI >30) with obesity-related health conditions that include hypertension, coronary heart disease and GERD . Obesity is unchanged. BMI is is above average; BMI management plan is completed. We discussed portion control and increasing exercise.

## 2021-07-28 NOTE — ASSESSMENT & PLAN NOTE
See neurology for his peripheral neuropathy starting on gabapentin.  Discussed taking Mirapex for his restless leg syndrome.  Improvement with his neuropathy while on gabapentin start numbness tingling in his upper and lower extremities.

## 2021-07-28 NOTE — ASSESSMENT & PLAN NOTE
Hypertension is improving with treatment.  Continue current treatment regimen.  Regular aerobic exercise.  Blood pressure will be reassessed in 3 months.

## 2021-07-28 NOTE — PROGRESS NOTES
The ABCs of the Annual Wellness Visit  Subsequent Medicare Wellness Visit    Chief Complaint   Patient presents with   • Medicare Wellness-subsequent       Subjective   History of Present Illness:  Jeyson Rose is a 61 y.o. male who presents for a Subsequent Medicare Wellness Visit.    HEALTH RISK ASSESSMENT    Recent Hospitalizations:  Recently treated at the following:  [facility]    Current Medical Providers:  Patient Care Team:  Rajendra Sam APRN as PCP - General (Nurse Practitioner)  Houston Martini MD as Consulting Physician (Cardiac Electrophysiology)    Smoking Status:  Social History     Tobacco Use   Smoking Status Former Smoker   • Packs/day: 1.50   • Years: 30.00   • Pack years: 45.00   • Types: Cigarettes   • Quit date:    • Years since quittin.5   Smokeless Tobacco Never Used       Alcohol Consumption:  Social History     Substance and Sexual Activity   Alcohol Use Yes    Comment: SOCIALLY       Depression Screen:   PHQ-2/PHQ-9 Depression Screening 2021   Little interest or pleasure in doing things 0   Feeling down, depressed, or hopeless 0   Total Score 0       Fall Risk Screen:  JESSENIAADI Fall Risk Assessment has not been completed.    Health Habits and Functional and Cognitive Screening:  Functional & Cognitive Status 2021   Do you have difficulty preparing food and eating? No   Do you have difficulty bathing yourself, getting dressed or grooming yourself? No   Do you have difficulty using the toilet? No   Do you have difficulty moving around from place to place? No   Do you have trouble with steps or getting out of a bed or a chair? No   Current Diet Well Balanced Diet   Dental Exam Up to date   Eye Exam Up to date   Exercise (times per week) 0 times per week   Current Exercises Include No Regular Exercise   Do you need help using the phone?  No   Are you deaf or do you have serious difficulty hearing?  No   Do you need help with transportation? No   Do you  need help shopping? No   Do you need help preparing meals?  No   Do you need help with housework?  No   Do you need help with laundry? No   Do you need help taking your medications? No   Do you need help managing money? No   Do you ever drive or ride in a car without wearing a seat belt? No   Have you felt unusual stress, anger or loneliness in the last month? No   Who do you live with? Spouse   If you need help, do you have trouble finding someone available to you? No   Have you been bothered in the last four weeks by sexual problems? Yes   Do you have difficulty concentrating, remembering or making decisions? No         Does the patient have evidence of cognitive impairment? No    Asprin use counseling:Taking ASA appropriately as indicated    Age-appropriate Screening Schedule:  Refer to the list below for future screening recommendations based on patient's age, sex and/or medical conditions. Orders for these recommended tests are listed in the plan section. The patient has been provided with a written plan.           The following portions of the patient's history were reviewed and updated as appropriate: allergies, current medications, past family history, past medical history, past social history, past surgical history and problem list.    Outpatient Medications Prior to Visit   Medication Sig Dispense Refill   • amitriptyline (ELAVIL) 25 MG tablet Take 50 mg by mouth Every Night.     • amLODIPine (NORVASC) 10 MG tablet Take 10 mg by mouth Daily.     • carvedilol (COREG) 12.5 MG tablet Take 12.5 mg by mouth 2 (Two) Times a Day.     • gabapentin (NEURONTIN) 300 MG capsule Take 300 mg by mouth Every Night.     • losartan (COZAAR) 100 MG tablet Take 100 mg by mouth Daily.     • pramipexole (MIRAPEX) 0.25 MG tablet Take 0.25 mg by mouth Every Night.     • esomeprazole (nexIUM) 40 MG capsule      • amLODIPine (NORVASC) 5 MG tablet Take 5 mg by mouth Daily.     • docusate sodium (COLACE) 100 MG capsule Take 1 capsule  "by mouth 2 (Two) Times a Day. 60 capsule 0   • ondansetron (Zofran) 4 MG tablet Take 1 tablet by mouth Every 6 (Six) Hours As Needed for Nausea or Vomiting. 20 tablet 0   • oxyCODONE-acetaminophen (PERCOCET) 7.5-325 MG per tablet Take 1-2 tablets by mouth every 4-6 hours prn pain 50 tablet 0   • rivaroxaban (Xarelto) 10 MG tablet Take 1 tablet by mouth Daily. 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 Patient does not have an advance directive, information provided.    Review of Systems    Compared to one year ago, the patient feels his physical health is better.  Compared to one year ago, the patient feels his mental health is better.    Reviewed chart for potential of high risk medication in the elderly: yes, tolerating medication well  Reviewed chart for potential of harmful drug interactions in the elderly:yes    Objective         Vitals:    07/28/21 1140   BP: 124/62   Pulse: 62   Temp: 97.4 °F (36.3 °C)   Weight: 102 kg (224 lb 3.2 oz)   Height: 172.7 cm (68\")   PainSc:   2       Body mass index is 34.09 kg/m².  Discussed the patient's BMI with him. The BMI is above average; BMI management plan is " completed.    Physical Exam  Vitals and nursing note reviewed.   Constitutional:       General: He is not in acute distress.     Appearance: Normal appearance. He is well-developed and well-groomed. He is obese. He is not ill-appearing.   HENT:      Head: Normocephalic and atraumatic.      Right Ear: Tympanic membrane, ear canal and external ear normal.      Left Ear: Tympanic membrane, ear canal and external ear normal.      Nose: Nose normal. No congestion or rhinorrhea.      Mouth/Throat:      Mouth: Mucous membranes are moist.   Eyes:      Extraocular Movements: Extraocular movements intact.      Pupils: Pupils are equal, round, and reactive to light.   Cardiovascular:      Rate and Rhythm: Normal rate and regular rhythm.      Pulses: Normal pulses.      Heart sounds: Normal heart sounds. No murmur heard.   No friction rub. No gallop.    Pulmonary:      Effort: Pulmonary effort is normal. No respiratory distress.      Breath sounds: Normal breath sounds. No wheezing, rhonchi or rales.   Abdominal:      General: Bowel sounds are normal. There is no distension.      Palpations: There is no mass.      Tenderness: There is no abdominal tenderness. There is no guarding.      Hernia: No hernia is present.   Musculoskeletal:         General: No swelling or tenderness. Normal range of motion.      Cervical back: Normal range of motion and neck supple. No rigidity or tenderness.   Skin:     General: Skin is warm.      Capillary Refill: Capillary refill takes less than 2 seconds.      Findings: No rash.   Neurological:      General: No focal deficit present.      Mental Status: He is alert and oriented to person, place, and time.      Cranial Nerves: No cranial nerve deficit.      Sensory: No sensory deficit.      Motor: No weakness.      Gait: Gait normal.      Deep Tendon Reflexes: Reflexes normal.   Psychiatric:         Mood and Affect: Mood normal.         Behavior: Behavior normal.         Thought Content: Thought  content normal.         Judgment: Judgment normal.               Assessment/Plan   Medicare Risks and Personalized Health Plan  CMS Preventative Services Quick Reference  Cardiovascular risk  Chronic Pain   Colon Cancer Screening  Dementia/Memory   Depression/Dysphoria  Diabetic Lab Screening   Fall Risk  Glaucoma Risk  Hearing Problem  Immunizations Discussed/Encouraged (specific immunizations; Tdap, Influenza, Pneumococcal 23 and COVID19 )  Inactivity/Sedentary  Obesity/Overweight   Polypharmacy    The above risks/problems have been discussed with the patient.  Pertinent information has been shared with the patient in the After Visit Summary.  Follow up plans and orders are seen below in the Assessment/Plan Section.    Diagnoses and all orders for this visit:    1. Medicare annual wellness visit, subsequent (Primary)    2. Gastroesophageal reflux disease without esophagitis  Assessment & Plan:  Reflux is well controlled on omeprazole 40 mg daily.  Continue current regimen.    Orders:  -     esomeprazole (nexIUM) 40 MG capsule; Take 1 capsule by mouth Every Morning Before Breakfast.  Dispense: 90 capsule; Refill: 1    3. Essential (primary) hypertension  Assessment & Plan:  Hypertension is improving with treatment.  Continue current treatment regimen.  Regular aerobic exercise.  Blood pressure will be reassessed in 3 months.      4. Coronary artery disease involving native coronary artery of native heart without angina pectoris  Assessment & Plan:  Coronary artery disease is stable.  Continue current treatment regimen.  Weight loss.  Regular aerobic exercise.  Cardiac status will be reassessed in 3 months.      5. Peripheral neuropathy due to chemotherapy (CMS/[facility])  Assessment & Plan:  See neurology for his peripheral neuropathy starting on gabapentin.  Discussed taking Mirapex for his restless leg syndrome.  Improvement with his neuropathy while on gabapentin start numbness tingling in his upper and lower  extremities.      6. Obesity (BMI 30.0-34.9)  Assessment & Plan:  Patient's (Body mass index is 34.09 kg/m².) indicates that they are obese (BMI >30) with obesity-related health conditions that include hypertension, coronary heart disease and GERD . Obesity is unchanged. BMI is is above average; BMI management plan is completed. We discussed portion control and increasing exercise.       7. Primary squamous cell carcinoma of head and neck (CMS/HCC)  Comments:  history of squamous cell carcioma. Completed chemo and radiation    Follow Up:  Return in about 3 months (around 10/28/2021) for Next scheduled follow up.     An After Visit Summary and PPPS were given to the patient.

## 2021-08-19 ENCOUNTER — OFFICE VISIT (OUTPATIENT)
Dept: ORTHOPEDIC SURGERY | Facility: CLINIC | Age: 61
End: 2021-08-19

## 2021-08-19 VITALS — WEIGHT: 224 LBS | BODY MASS INDEX: 33.95 KG/M2 | HEIGHT: 68 IN | TEMPERATURE: 97.5 F

## 2021-08-19 DIAGNOSIS — M17.12 PRIMARY OSTEOARTHRITIS OF LEFT KNEE: Primary | ICD-10-CM

## 2021-08-19 DIAGNOSIS — Z96.651 S/P TKR (TOTAL KNEE REPLACEMENT), RIGHT: ICD-10-CM

## 2021-08-19 PROCEDURE — 99213 OFFICE O/P EST LOW 20 MIN: CPT | Performed by: ORTHOPAEDIC SURGERY

## 2021-08-19 RX ORDER — CARVEDILOL 12.5 MG/1
TABLET ORAL
COMMUNITY
Start: 2021-07-15 | End: 2021-10-01 | Stop reason: SDUPTHER

## 2021-08-19 NOTE — PROGRESS NOTES
"Chief Complaint  Follow-up and Pain of the Right Knee and Post-op Knee    Subjective    History of Present Illness      Jeyson Rose is a 61 y.o. male who presents to [facility] for follow-up on right total knee arthroplasty and left knee pain.  History of Present Illness this is a patient who is very pleased with his outcome following right total knee arthroplasty.  I had performed his right knee surgery on 26 March 2021.  He has done extremely well with his knee replacement surgery.  He has recovered a full range of motion following physical therapy.  He is not using any pain medication to control his symptoms at this point.  He states \"my new knee feels like $1 million \".  He is now having increasing pain and discomfort in the left knee.  His symptoms are not as marked enough to require surgical intervention.  He would like to be considered for viscosupplementation injections.  We will have to call his insurance company to get those injections approved.  Pain Location:  BILATERAL knee  Radiation: none  Quality: aching, sharp  Intensity/Severity: mild-moderate  Duration: 2-3 years  Progression of symptoms: no worsening, reports improvement to the right knee  Onset quality: gradual   Timing: intermittent  Aggravating Factors: going up and down stairs, kneeling, rising after sitting  Alleviating Factors: NSAIDs  Previous Episodes: yes  Associated Symptoms: pain, swelling  ADLs Affected: ambulating, work related activities, recreational activities/sports  Previous Treatment: NSAIDs, brace and prior surgery       Objective   Vital Signs:   Temp 97.5 °F (36.4 °C)   Ht 172.7 cm (67.99\")   Wt 102 kg (224 lb)   BMI 34.07 kg/m²     Physical Exam  Physical Exam  Vitals signs and nursing note reviewed.   Constitutional:       Appearance: Normal appearance.   Pulmonary:      Effort: Pulmonary effort is normal.   Skin:     General: Skin is warm and dry.      Capillary Refill: Capillary " refill takes less than 2 seconds.   Neurological:      General: No focal deficit present.      Mental Status: He is alert and oriented to person, place, and time. Mental status is at baseline.   Psychiatric:         Mood and Affect: Mood normal.         Behavior: Behavior normal.         Thought Content: Thought content normal.         Judgment: Judgment normal.     Ortho Exam   Left knee (varus). Patient has crepitus throughout range of motion. Positive patellar grind test. Mild effusion. Lachman is negative. Pivot shift is negative. Anterior and posterior drawer signs are negative. Significant joint line tenderness is noted on the medial aspect of the knee. Patient has a varus orientation of the knee. There is fullness and tenderness in the Popliteal fossa. Mild distention of a Popliteal cyst is noted in this location. Range of motion in flexion is from 0-110 degrees. Neurovascular status is intact.  Dorsalis pedis and posterior tibial artery pulses are palpable. Common peroneal nerve function is well preserved. Patient's gait is cautious and antalgic. Skin and soft tissues are mildly swollen, consistent with synovitis and effusion. The patient has a significant limp with the first few steps after starting the gait cycle. Getting out of a chair takes a lot of effort due to pain on knee flexion.     Right knee.The patient is status post total knee arthroplasty postoperative 5 month(s). Incision is clean. Calf is soft and nontender. Homans sign is negative. There is no clicking, popping or catching. Anterior and posterior drawer signs are negative.  There is no instability of the components. Appropriate amounts of swelling and bruising are noted. Dorsalis pedis and posterior tibial artery pulses are palpable. Common peroneal nerve function is well preserved. Range of motion is from 0-125 degrees of flexion. Gait is cautious but otherwise fairly normal. There is no evidence of a deep seated joint  infection.      Result Review :   The following data was reviewed by: Song Marsh MD on 08/19/2021:  Radiologic studies - see below for interpretation  no diagnostic testing performed this visit            Procedures           Assessment   Assessment and Plan    Diagnoses and all orders for this visit:    1. Primary osteoarthritis of left knee (Primary)  -     Visco Treatment; Future    2. S/P TKR (total knee replacement), right          Follow Up   · Compression/brace to the knee to prevent it from buckling and giving out.  · Steroid injections and viscosupplementation injection risks and benefits discussed with the patient at length.  · Calcium and vitamin D for bone health.  · , GI and dental procedure prophylaxis with antibiotics to prevent metastatic infection of the knee arthroplasty implants.  · There is no doubt in my mind that he is going to need a left total knee arthroplasty based on symptom progression.  We are trying her best to manage him with nonoperative care till such time that he is ready for that surgical intervention.  · Call for Monovisc for the left knee   · Rest, ice, compression, and elevation (RICE) therapy  · Stretching and strengthening exercises of the quads and the hamstrings.  · OTC Alternate Ibuprofen and Tylenol as needed  · Follow up in 4 week(s)  • Patient was given instructions and counseling regarding his condition or for health maintenance advice. Please see specific information pulled into the AVS if appropriate.     Song Marsh MD   Date of Encounter: 8/19/2021       EMR Dragon/Transcription disclaimer:  Much of this encounter note is an electronic transcription/translation of spoken language to printed text. The electronic translation of spoken language may permit erroneous, or at times, nonsensical words or phrases to be inadvertently transcribed; Although I have reviewed the note for such errors, some may still exist.

## 2021-08-25 PROBLEM — M17.12 PRIMARY OSTEOARTHRITIS OF LEFT KNEE: Status: ACTIVE | Noted: 2021-08-25

## 2021-10-01 NOTE — TELEPHONE ENCOUNTER
Caller: Jeyson Rose    Relationship: Self      Medication requested (name and dosage):    carvedilol (COREG) 12.5 MG tablet         Pharmacy where request should be sent: [id number]  [address] AT [facility] - [phone] [facility] [phone]   [phone]    Additional details provided by patient: THE PATIENT STATED HE ONLY HAS TWO PILLS LEFT OF THIS MEDICATION. BUT HE WOULD LIKE PCP TO START REFILLING ALL OF HIS MEDICATIONS IN THE FUTURE    Best call back number: [phone]    Does the patient have less than a 3 day supply:  [x] Yes  [] No    Miguel Bhandari Rep   10/01/21 14:36 EDT

## 2021-10-04 RX ORDER — CARVEDILOL 12.5 MG/1
12.5 TABLET ORAL 2 TIMES DAILY WITH MEALS
Qty: 30 TABLET | Refills: 0 | Status: SHIPPED | OUTPATIENT
Start: 2021-10-04 | End: 2021-10-28 | Stop reason: SDUPTHER

## 2021-10-07 ENCOUNTER — CLINICAL SUPPORT (OUTPATIENT)
Dept: ORTHOPEDIC SURGERY | Facility: CLINIC | Age: 61
End: 2021-10-07

## 2021-10-07 VITALS — BODY MASS INDEX: 33.34 KG/M2 | WEIGHT: 220 LBS | HEIGHT: 68 IN | TEMPERATURE: 96.8 F

## 2021-10-07 DIAGNOSIS — M17.0 PRIMARY OSTEOARTHRITIS OF BOTH KNEES: Primary | ICD-10-CM

## 2021-10-07 PROCEDURE — 20610 DRAIN/INJ JOINT/BURSA W/O US: CPT | Performed by: ORTHOPAEDIC SURGERY

## 2021-10-07 NOTE — PROGRESS NOTES
"Chief Complaint  Follow-up and Pain of the Left Knee    Subjective    History of Present Illness      Jeyson Rose is a 61 y.o. male who presents to [facility] for Patient returns today for left knee pain.  His pain is located over the medial and lateral aspect of the joint.  The pain has been progressive in nature and remains intermittent .  His pain is worsened by kneeling, squatting. There has been improvement in the past with heat.       Objective   Vital Signs:   Temp 96.8 °F (36 °C)   Ht 172.7 cm (68\")   Wt 99.8 kg (220 lb)   BMI 33.45 kg/m²     Physical Exam  61 y.o. male is awake, alert, oriented, in no acute distress and well developed, well nourished.  Ortho Exam   LEFT knee  Left knee (varus). Patient has crepitus throughout range of motion. Positive patellar grind test. Mild effusion. Lachman is negative. Pivot shift is negative. Anterior and posterior drawer signs are negative. Significant joint line tenderness is noted on the medial aspect of the knee. Patient has a varus orientation of the knee. There is fullness and tenderness in the Popliteal fossa. Mild distention of a Popliteal cyst is noted in this location. Range of motion in flexion is from 0-110 degrees. Neurovascular status is intact.  Dorsalis pedis and posterior tibial artery pulses are palpable. Common peroneal nerve function is well preserved. Patient's gait is cautious and antalgic. Skin and soft tissues are mildly swollen, consistent with synovitis and effusion. The patient has a significant limp with the first few steps after starting the gait cycle. Getting out of a chair takes a lot of effort due to pain on knee flexion.       Result Review :                  Large Joint Arthrocentesis: L knee  Date/Time: 10/7/2021 11:05 AM  Consent given by: patient  Site marked: site marked  Timeout: Immediately prior to procedure a time out was called to verify the correct patient, procedure, equipment, " support staff and site/side marked as required   Supporting Documentation  Indications: pain   Procedure Details  Location: knee - L knee  Preparation: Patient was prepped and draped in the usual sterile fashion  Needle size: 25 G  Approach: anteromedial  Medications administered: 88 mg Hyaluronan 88 MG/4ML; 2 mL lidocaine (cardiac)  Patient tolerance: patient tolerated the procedure well with no immediate complications               Assessment   Assessment and Plan    Problem List Items Addressed This Visit        Musculoskeletal and Injuries    Primary osteoarthritis of both knees - Primary          Follow Up   · Injected patient's left knee joint(s)with Monovisc from an anteromedial approach   · Compression/brace   · Rest, ice, compression, and elevation (RICE) therapy  · OTC Tylenol 500-1000mg by mouth every 6 hours as needed for pain   · Follow up in 6 month(s)  • Patient was given instructions and counseling regarding his condition or for health maintenance advice. Please see specific information pulled into the AVS if appropriate.     Song Marsh MD   Date of Encounter: 10/7/2021        EMR Dragon/Transcription disclaimer:  Much of this encounter note is an electronic transcription/translation of spoken language to printed text. The electronic translation of spoken language may permit erroneous, or at times, nonsensical words or phrases to be inadvertently transcribed; Although I have reviewed the note for such errors, some may still exist.

## 2021-10-28 ENCOUNTER — OFFICE VISIT (OUTPATIENT)
Dept: FAMILY MEDICINE CLINIC | Facility: CLINIC | Age: 61
End: 2021-10-28

## 2021-10-28 VITALS
HEART RATE: 55 BPM | HEIGHT: 68 IN | OXYGEN SATURATION: 97 % | WEIGHT: 220.4 LBS | DIASTOLIC BLOOD PRESSURE: 82 MMHG | BODY MASS INDEX: 33.4 KG/M2 | SYSTOLIC BLOOD PRESSURE: 138 MMHG | TEMPERATURE: 97.6 F

## 2021-10-28 DIAGNOSIS — Z23 NEED FOR INFLUENZA VACCINATION: ICD-10-CM

## 2021-10-28 DIAGNOSIS — G25.81 RESTLESS LEG: ICD-10-CM

## 2021-10-28 DIAGNOSIS — R73.9 ELEVATED BLOOD SUGAR: ICD-10-CM

## 2021-10-28 DIAGNOSIS — I10 ESSENTIAL (PRIMARY) HYPERTENSION: Primary | ICD-10-CM

## 2021-10-28 DIAGNOSIS — G60.9 IDIOPATHIC PERIPHERAL NEUROPATHY: ICD-10-CM

## 2021-10-28 DIAGNOSIS — E66.9 OBESITY (BMI 30.0-34.9): ICD-10-CM

## 2021-10-28 DIAGNOSIS — K21.9 GASTROESOPHAGEAL REFLUX DISEASE WITHOUT ESOPHAGITIS: ICD-10-CM

## 2021-10-28 DIAGNOSIS — N18.30 STAGE 3 CHRONIC KIDNEY DISEASE, UNSPECIFIED WHETHER STAGE 3A OR 3B CKD (HCC): ICD-10-CM

## 2021-10-28 LAB
ALBUMIN SERPL-MCNC: 4.5 G/DL (ref 3.5–5.2)
ALBUMIN/GLOB SERPL: 1.7 G/DL
ALP SERPL-CCNC: 66 U/L (ref 39–117)
ALT SERPL W P-5'-P-CCNC: 22 U/L (ref 1–41)
ANION GAP SERPL CALCULATED.3IONS-SCNC: 7 MMOL/L (ref 5–15)
AST SERPL-CCNC: 22 U/L (ref 1–40)
BASOPHILS # BLD AUTO: 0.04 10*3/MM3 (ref 0–0.2)
BASOPHILS NFR BLD AUTO: 0.9 % (ref 0–1.5)
BILIRUB SERPL-MCNC: 0.5 MG/DL (ref 0–1.2)
BUN SERPL-MCNC: 18 MG/DL (ref 8–23)
BUN/CREAT SERPL: 15.4 (ref 7–25)
CALCIUM SPEC-SCNC: 9.4 MG/DL (ref 8.6–10.5)
CHLORIDE SERPL-SCNC: 101 MMOL/L (ref 98–107)
CHOLEST SERPL-MCNC: 151 MG/DL (ref 0–200)
CO2 SERPL-SCNC: 31 MMOL/L (ref 22–29)
CREAT SERPL-MCNC: 1.17 MG/DL (ref 0.76–1.27)
DEPRECATED RDW RBC AUTO: 44.7 FL (ref 37–54)
EOSINOPHIL # BLD AUTO: 0.14 10*3/MM3 (ref 0–0.4)
EOSINOPHIL NFR BLD AUTO: 3.2 % (ref 0.3–6.2)
ERYTHROCYTE [DISTWIDTH] IN BLOOD BY AUTOMATED COUNT: 13.5 % (ref 12.3–15.4)
GFR SERPL CREATININE-BSD FRML MDRD: 63 ML/MIN/1.73
GFR SERPL CREATININE-BSD FRML MDRD: 76 ML/MIN/1.73
GLOBULIN UR ELPH-MCNC: 2.6 GM/DL
GLUCOSE SERPL-MCNC: 104 MG/DL (ref 65–99)
HBA1C MFR BLD: 5.3 % (ref 4.8–5.6)
HCT VFR BLD AUTO: 43 % (ref 37.5–51)
HDLC SERPL-MCNC: 82 MG/DL (ref 40–60)
HGB BLD-MCNC: 14.5 G/DL (ref 13–17.7)
IMM GRANULOCYTES # BLD AUTO: 0.01 10*3/MM3 (ref 0–0.05)
IMM GRANULOCYTES NFR BLD AUTO: 0.2 % (ref 0–0.5)
LDLC SERPL CALC-MCNC: 60 MG/DL (ref 0–100)
LDLC/HDLC SERPL: 0.74 {RATIO}
LYMPHOCYTES # BLD AUTO: 1 10*3/MM3 (ref 0.7–3.1)
LYMPHOCYTES NFR BLD AUTO: 23 % (ref 19.6–45.3)
MCH RBC QN AUTO: 30.5 PG (ref 26.6–33)
MCHC RBC AUTO-ENTMCNC: 33.7 G/DL (ref 31.5–35.7)
MCV RBC AUTO: 90.3 FL (ref 79–97)
MONOCYTES # BLD AUTO: 0.53 10*3/MM3 (ref 0.1–0.9)
MONOCYTES NFR BLD AUTO: 12.2 % (ref 5–12)
NEUTROPHILS NFR BLD AUTO: 2.63 10*3/MM3 (ref 1.7–7)
NEUTROPHILS NFR BLD AUTO: 60.5 % (ref 42.7–76)
NRBC BLD AUTO-RTO: 0 /100 WBC (ref 0–0.2)
PLATELET # BLD AUTO: 199 10*3/MM3 (ref 140–450)
PMV BLD AUTO: 10 FL (ref 6–12)
POTASSIUM SERPL-SCNC: 4 MMOL/L (ref 3.5–5.2)
PROT SERPL-MCNC: 7.1 G/DL (ref 6–8.5)
RBC # BLD AUTO: 4.76 10*6/MM3 (ref 4.14–5.8)
SODIUM SERPL-SCNC: 139 MMOL/L (ref 136–145)
T4 FREE SERPL-MCNC: 1.12 NG/DL (ref 0.93–1.7)
TRIGL SERPL-MCNC: 40 MG/DL (ref 0–150)
TSH SERPL DL<=0.05 MIU/L-ACNC: 11.9 UIU/ML (ref 0.27–4.2)
VLDLC SERPL-MCNC: 9 MG/DL (ref 5–40)
WBC # BLD AUTO: 4.35 10*3/MM3 (ref 3.4–10.8)

## 2021-10-28 PROCEDURE — 85025 COMPLETE CBC W/AUTO DIFF WBC: CPT | Performed by: NURSE PRACTITIONER

## 2021-10-28 PROCEDURE — 90686 IIV4 VACC NO PRSV 0.5 ML IM: CPT | Performed by: NURSE PRACTITIONER

## 2021-10-28 PROCEDURE — 80061 LIPID PANEL: CPT | Performed by: NURSE PRACTITIONER

## 2021-10-28 PROCEDURE — 84443 ASSAY THYROID STIM HORMONE: CPT | Performed by: NURSE PRACTITIONER

## 2021-10-28 PROCEDURE — 80053 COMPREHEN METABOLIC PANEL: CPT | Performed by: NURSE PRACTITIONER

## 2021-10-28 PROCEDURE — 83036 HEMOGLOBIN GLYCOSYLATED A1C: CPT | Performed by: NURSE PRACTITIONER

## 2021-10-28 PROCEDURE — 99214 OFFICE O/P EST MOD 30 MIN: CPT | Performed by: NURSE PRACTITIONER

## 2021-10-28 PROCEDURE — 84439 ASSAY OF FREE THYROXINE: CPT | Performed by: NURSE PRACTITIONER

## 2021-10-28 PROCEDURE — G0008 ADMIN INFLUENZA VIRUS VAC: HCPCS | Performed by: NURSE PRACTITIONER

## 2021-10-28 RX ORDER — AMLODIPINE BESYLATE 10 MG/1
10 TABLET ORAL DAILY
Qty: 90 TABLET | Refills: 1 | Status: SHIPPED | OUTPATIENT
Start: 2021-10-28 | End: 2021-11-19 | Stop reason: SDUPTHER

## 2021-10-28 RX ORDER — ESOMEPRAZOLE MAGNESIUM 40 MG/1
40 CAPSULE, DELAYED RELEASE ORAL
Qty: 90 CAPSULE | Refills: 1 | Status: SHIPPED | OUTPATIENT
Start: 2021-10-28

## 2021-10-28 RX ORDER — PRAMIPEXOLE DIHYDROCHLORIDE 0.5 MG/1
0.5 TABLET ORAL NIGHTLY
Qty: 90 TABLET | Refills: 1 | Status: SHIPPED | OUTPATIENT
Start: 2021-10-28 | End: 2022-05-03 | Stop reason: SDUPTHER

## 2021-10-28 RX ORDER — CARVEDILOL 12.5 MG/1
12.5 TABLET ORAL 2 TIMES DAILY WITH MEALS
Qty: 90 TABLET | Refills: 1 | Status: SHIPPED | OUTPATIENT
Start: 2021-10-28 | End: 2022-02-07

## 2021-10-28 RX ORDER — LOSARTAN POTASSIUM 100 MG/1
100 TABLET ORAL DAILY
Qty: 90 TABLET | Refills: 1 | Status: SHIPPED | OUTPATIENT
Start: 2021-10-28 | End: 2022-06-10

## 2021-10-28 NOTE — PROGRESS NOTES
"Chief Complaint  Follow-up and Hypertension    Subjective          Jeyson Rose presents to [facility]  History of Present Illness  Presents today for 3-month follow-up on hypertension, restless leg syndrome, chronic kidney disease stage III, and GERD.  He reports that his acid reflux has resolved completely on Nexium.  He reports his blood pressures at home are approximately 130s over 80s.  Denies chest pain, syncope, palpitations.  He reports that his restless leg syndrome has not improved while on Mirapex.  Still has neuropathy in his lower extremities.  Take gabapentin 300 mg nightly.  Objective   Vital Signs:   /82   Pulse 55   Temp 97.6 °F (36.4 °C)   Ht 172.7 cm (68\")   Wt 100 kg (220 lb 6.4 oz)   SpO2 97%   BMI 33.51 kg/m²     Physical Exam  Vitals reviewed.   Constitutional:       Appearance: Normal appearance. He is well-developed. He is obese.   HENT:      Head: Normocephalic and atraumatic.      Right Ear: External ear normal.      Left Ear: External ear normal.      Mouth/Throat:      Pharynx: No oropharyngeal exudate.   Eyes:      Conjunctiva/sclera: Conjunctivae normal.      Pupils: Pupils are equal, round, and reactive to light.   Cardiovascular:      Rate and Rhythm: Normal rate and regular rhythm.      Heart sounds: No murmur heard.  No friction rub. No gallop.    Pulmonary:      Effort: Pulmonary effort is normal.      Breath sounds: Normal breath sounds. No wheezing or rhonchi.   Abdominal:      General: Bowel sounds are normal. There is no distension.      Palpations: Abdomen is soft.      Tenderness: There is no abdominal tenderness.   Skin:     General: Skin is warm and dry.   Neurological:      Mental Status: He is alert and oriented to person, place, and time.   Psychiatric:         Mood and Affect: Mood and affect normal.         Behavior: Behavior normal.         Thought Content: Thought content normal.         Judgment: Judgment normal. "        Result Review :     Common labs    Common Labsle 3/12/21 3/12/21 3/24/21 3/24/21    0418 0559 1048 1048   Glucose    106 (A)   Glucose  174 (A)     BUN  25  17   Creatinine  1.46 (A)  1.04   eGFR Non African Am    72   Sodium  136  139   Potassium  3.5  4.0   Chloride  98 (A)  103   Calcium  10.0  8.9   Albumin  4.2     Total Bilirubin  0.40     Alkaline Phosphatase  67     AST (SGOT)  26     ALT (SGPT)  42 (A)     WBC 9.21  5.44    Hemoglobin 14.9  14.8    Hematocrit 44.7  44.8    Platelets 244  232    (A) Abnormal value                      Assessment and Plan    Diagnoses and all orders for this visit:    1. Essential (primary) hypertension (Primary)  Assessment & Plan:  Hypertension is Fairly controlled.  Continue current treatment regimen.  Weight loss.  Regular aerobic exercise.  Blood pressure will be reassessed in 3 months.    Orders:  -     amLODIPine (NORVASC) 10 MG tablet; Take 1 tablet by mouth Daily.  Dispense: 90 tablet; Refill: 1  -     carvedilol (COREG) 12.5 MG tablet; Take 1 tablet by mouth 2 (Two) Times a Day With Meals.  Dispense: 90 tablet; Refill: 1  -     losartan (COZAAR) 100 MG tablet; Take 1 tablet by mouth Daily.  Dispense: 90 tablet; Refill: 1  -     CBC & Differential  -     Comprehensive Metabolic Panel  -     Lipid Panel  -     TSH Rfx On Abnormal To Free T4    2. Restless leg  Assessment & Plan:  Increase Mirapex from 0.25 mg to 0.5 mg nightly.    Orders:  -     pramipexole (MIRAPEX) 0.5 MG tablet; Take 1 tablet by mouth Every Night.  Dispense: 90 tablet; Refill: 1    3. Gastroesophageal reflux disease without esophagitis  Assessment & Plan:  Reflux is well controlled at this time.  Continue current regimen    Orders:  -     esomeprazole (nexIUM) 40 MG capsule; Take 1 capsule by mouth Every Morning Before Breakfast.  Dispense: 90 capsule; Refill: 1    4. Stage 3 chronic kidney disease, unspecified whether stage 3a or 3b CKD (HCC)    5. Idiopathic peripheral  neuropathy  Assessment & Plan:  Continue current regimen      6. Obesity (BMI 30.0-34.9)  Assessment & Plan:  Patient's (Body mass index is 33.51 kg/m².) indicates that they are obese (BMI >30) with health conditions that include hypertension and GERD . Weight is unchanged. BMI is is above average; BMI management plan is completed. We discussed portion control and increasing exercise.     Orders:  -     Hemoglobin A1c    7. Need for influenza vaccination  -     FluLaval/Fluarix/Fluzone >6 Months ([id number])    8. Elevated blood sugar  -     Hemoglobin A1c      Follow Up   Return in about 3 months (around 1/28/2022), or if symptoms worsen or fail to improve, for Next scheduled follow up.  Patient was given instructions and counseling regarding his condition or for health maintenance advice. Please see specific information pulled into the AVS if appropriate.

## 2021-10-28 NOTE — ASSESSMENT & PLAN NOTE
Hypertension is Fairly controlled.  Continue current treatment regimen.  Weight loss.  Regular aerobic exercise.  Blood pressure will be reassessed in 3 months.

## 2021-10-28 NOTE — ASSESSMENT & PLAN NOTE
Patient's (Body mass index is 33.51 kg/m².) indicates that they are obese (BMI >30) with health conditions that include hypertension and GERD . Weight is unchanged. BMI is is above average; BMI management plan is completed. We discussed portion control and increasing exercise.

## 2021-10-29 RX ORDER — LEVOTHYROXINE SODIUM 0.03 MG/1
25 TABLET ORAL DAILY
Qty: 90 TABLET | Refills: 1 | Status: SHIPPED | OUTPATIENT
Start: 2021-10-29 | End: 2022-02-02

## 2021-10-29 RX ORDER — LEVOTHYROXINE SODIUM 0.03 MG/1
25 TABLET ORAL DAILY
COMMUNITY
End: 2022-02-01

## 2021-11-05 DIAGNOSIS — M17.12 PRIMARY OSTEOARTHRITIS OF LEFT KNEE: Primary | ICD-10-CM

## 2021-11-19 DIAGNOSIS — I10 ESSENTIAL (PRIMARY) HYPERTENSION: ICD-10-CM

## 2021-11-19 RX ORDER — AMLODIPINE BESYLATE 10 MG/1
10 TABLET ORAL DAILY
Qty: 90 TABLET | Refills: 1 | Status: SHIPPED | OUTPATIENT
Start: 2021-11-19 | End: 2022-05-02

## 2021-11-19 NOTE — TELEPHONE ENCOUNTER
Caller: Jeyson Rose    Relationship: Self    Best call back number: [phone]    Requested Prescriptions:   Requested Prescriptions     Pending Prescriptions Disp Refills   • amLODIPine (NORVASC) 10 MG tablet 90 tablet 1     Sig: Take 1 tablet by mouth Daily.        Pharmacy where request should be sent: [id number] [address] AT [facility] [phone]  - [phone] FX     Does the patient have less than a 3 day supply:  [] Yes  [x] No    Miguel Lockett Rep   11/19/21 09:11 EST

## 2022-02-01 ENCOUNTER — OFFICE VISIT (OUTPATIENT)
Dept: FAMILY MEDICINE CLINIC | Facility: CLINIC | Age: 62
End: 2022-02-01

## 2022-02-01 VITALS
OXYGEN SATURATION: 97 % | TEMPERATURE: 97.5 F | BODY MASS INDEX: 33.59 KG/M2 | HEART RATE: 60 BPM | WEIGHT: 221.6 LBS | SYSTOLIC BLOOD PRESSURE: 130 MMHG | DIASTOLIC BLOOD PRESSURE: 82 MMHG | HEIGHT: 68 IN

## 2022-02-01 DIAGNOSIS — K13.70 ORAL LESION: ICD-10-CM

## 2022-02-01 DIAGNOSIS — E03.8 SUBCLINICAL HYPOTHYROIDISM: Primary | ICD-10-CM

## 2022-02-01 DIAGNOSIS — R22.0 SWELLING OF GUMS: ICD-10-CM

## 2022-02-01 DIAGNOSIS — I10 ESSENTIAL (PRIMARY) HYPERTENSION: ICD-10-CM

## 2022-02-01 DIAGNOSIS — C76.0 PRIMARY SQUAMOUS CELL CARCINOMA OF HEAD AND NECK: ICD-10-CM

## 2022-02-01 LAB
ALBUMIN SERPL-MCNC: 4.2 G/DL (ref 3.5–5.2)
ALBUMIN/GLOB SERPL: 1.6 G/DL
ALP SERPL-CCNC: 63 U/L (ref 39–117)
ALT SERPL W P-5'-P-CCNC: 20 U/L (ref 1–41)
ANION GAP SERPL CALCULATED.3IONS-SCNC: 10.7 MMOL/L (ref 5–15)
AST SERPL-CCNC: 21 U/L (ref 1–40)
BASOPHILS # BLD AUTO: 0.04 10*3/MM3 (ref 0–0.2)
BASOPHILS NFR BLD AUTO: 0.5 % (ref 0–1.5)
BILIRUB SERPL-MCNC: 0.4 MG/DL (ref 0–1.2)
BUN SERPL-MCNC: 21 MG/DL (ref 8–23)
BUN/CREAT SERPL: 17.4 (ref 7–25)
CALCIUM SPEC-SCNC: 9.7 MG/DL (ref 8.6–10.5)
CHLORIDE SERPL-SCNC: 104 MMOL/L (ref 98–107)
CO2 SERPL-SCNC: 27.3 MMOL/L (ref 22–29)
CREAT SERPL-MCNC: 1.21 MG/DL (ref 0.76–1.27)
DEPRECATED RDW RBC AUTO: 43.3 FL (ref 37–54)
EOSINOPHIL # BLD AUTO: 0.17 10*3/MM3 (ref 0–0.4)
EOSINOPHIL NFR BLD AUTO: 2.3 % (ref 0.3–6.2)
ERYTHROCYTE [DISTWIDTH] IN BLOOD BY AUTOMATED COUNT: 13.3 % (ref 12.3–15.4)
GFR SERPL CREATININE-BSD FRML MDRD: 61 ML/MIN/1.73
GFR SERPL CREATININE-BSD FRML MDRD: 73 ML/MIN/1.73
GLOBULIN UR ELPH-MCNC: 2.6 GM/DL
GLUCOSE SERPL-MCNC: 97 MG/DL (ref 65–99)
HCT VFR BLD AUTO: 43.9 % (ref 37.5–51)
HGB BLD-MCNC: 14.8 G/DL (ref 13–17.7)
IMM GRANULOCYTES # BLD AUTO: 0.02 10*3/MM3 (ref 0–0.05)
IMM GRANULOCYTES NFR BLD AUTO: 0.3 % (ref 0–0.5)
LYMPHOCYTES # BLD AUTO: 1.28 10*3/MM3 (ref 0.7–3.1)
LYMPHOCYTES NFR BLD AUTO: 17.3 % (ref 19.6–45.3)
MCH RBC QN AUTO: 30.3 PG (ref 26.6–33)
MCHC RBC AUTO-ENTMCNC: 33.7 G/DL (ref 31.5–35.7)
MCV RBC AUTO: 90 FL (ref 79–97)
MONOCYTES # BLD AUTO: 0.87 10*3/MM3 (ref 0.1–0.9)
MONOCYTES NFR BLD AUTO: 11.7 % (ref 5–12)
NEUTROPHILS NFR BLD AUTO: 5.04 10*3/MM3 (ref 1.7–7)
NEUTROPHILS NFR BLD AUTO: 67.9 % (ref 42.7–76)
NRBC BLD AUTO-RTO: 0 /100 WBC (ref 0–0.2)
PLATELET # BLD AUTO: 204 10*3/MM3 (ref 140–450)
PMV BLD AUTO: 9.7 FL (ref 6–12)
POTASSIUM SERPL-SCNC: 4.3 MMOL/L (ref 3.5–5.2)
PROT SERPL-MCNC: 6.8 G/DL (ref 6–8.5)
RBC # BLD AUTO: 4.88 10*6/MM3 (ref 4.14–5.8)
SODIUM SERPL-SCNC: 142 MMOL/L (ref 136–145)
T4 FREE SERPL-MCNC: 1.05 NG/DL (ref 0.93–1.7)
TSH SERPL DL<=0.05 MIU/L-ACNC: 6.08 UIU/ML (ref 0.27–4.2)
WBC NRBC COR # BLD: 7.42 10*3/MM3 (ref 3.4–10.8)

## 2022-02-01 PROCEDURE — 80053 COMPREHEN METABOLIC PANEL: CPT | Performed by: NURSE PRACTITIONER

## 2022-02-01 PROCEDURE — 85025 COMPLETE CBC W/AUTO DIFF WBC: CPT | Performed by: NURSE PRACTITIONER

## 2022-02-01 PROCEDURE — 84439 ASSAY OF FREE THYROXINE: CPT | Performed by: NURSE PRACTITIONER

## 2022-02-01 PROCEDURE — 84443 ASSAY THYROID STIM HORMONE: CPT | Performed by: NURSE PRACTITIONER

## 2022-02-01 PROCEDURE — 99214 OFFICE O/P EST MOD 30 MIN: CPT | Performed by: NURSE PRACTITIONER

## 2022-02-01 RX ORDER — AMOXICILLIN 875 MG/1
875 TABLET, COATED ORAL 2 TIMES DAILY
Qty: 14 TABLET | Refills: 0 | Status: SHIPPED | OUTPATIENT
Start: 2022-02-01 | End: 2022-05-03

## 2022-02-01 NOTE — PROGRESS NOTES
"Chief Complaint  Hypertension    Subjective          Jeyson Rose presents to [facility]  History of Present Illness  Presents today for a 3-month follow-up on hypertension and recently diagnosed subclinical hypothyroidism.  Recently started on levothyroxine 25 mcg daily.  Blood pressures fairly controlled.  Denies chest pain, CP, palpitations.    He reports an oral lesion and swelling in his right lower mandible behind and around his lower back tooth.  He noticed the lesion about 2 weeks ago.  He states it is tender.  He also has a history of primary squamous cell carcinoma of the head and neck.  He has had several treatments of radiation and chemo in the past.     Dr. Santa is his oncologist. His ENT is Sean Zuniga.    Objective   Vital Signs:   /82   Pulse 60   Temp 97.5 °F (36.4 °C)   Ht 172.7 cm (68\")   Wt 101 kg (221 lb 9.6 oz)   SpO2 97%   BMI 33.69 kg/m²     Physical Exam  Vitals reviewed.   Constitutional:       Appearance: Normal appearance. He is well-developed.   HENT:      Head: Normocephalic and atraumatic.      Right Ear: External ear normal.      Left Ear: External ear normal.      Mouth/Throat:      Dentition: Gingival swelling and gum lesions present.      Pharynx: No oropharyngeal exudate.   Eyes:      Conjunctiva/sclera: Conjunctivae normal.      Pupils: Pupils are equal, round, and reactive to light.   Cardiovascular:      Rate and Rhythm: Normal rate and regular rhythm.      Heart sounds: No murmur heard.  No friction rub. No gallop.    Pulmonary:      Effort: Pulmonary effort is normal.      Breath sounds: Normal breath sounds. No wheezing or rhonchi.   Abdominal:      General: Bowel sounds are normal. There is no distension.      Palpations: Abdomen is soft.      Tenderness: There is no abdominal tenderness.   Skin:     General: Skin is warm and dry.   Neurological:      Mental Status: He is alert and oriented to person, place, and time. "   Psychiatric:         Mood and Affect: Mood and affect normal.         Behavior: Behavior normal.         Thought Content: Thought content normal.         Judgment: Judgment normal.        Result Review :                     Assessment and Plan    Diagnoses and all orders for this visit:    1. Subclinical hypothyroidism (Primary)  -     TSH+Free T4    2. Essential (primary) hypertension  -     CBC & Differential  -     Comprehensive Metabolic Panel    3. Oral lesion    4. Swelling of gums    5. 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  Dispense: 14 tablet; Refill: 0  Check CBC CMP and TSH and free T4.  Based on the thyroid profile will determine to continue increased levothyroxine dose.  Blood pressures fairly controlled.    With new oral lesion will prescribe amoxicillin if there is an infection.  Will call Dr. Zuniga ENT office to discuss new oral lesion.    Follow Up   Return in about 3 months (around 5/1/2022), or if symptoms worsen or fail to improve, for Next scheduled follow up.  Patient was given instructions and counseling regarding his condition or for health maintenance advice. Please see specific information pulled into the AVS if appropriate.

## 2022-02-02 DIAGNOSIS — E03.8 SUBCLINICAL HYPOTHYROIDISM: Primary | ICD-10-CM

## 2022-02-02 RX ORDER — LEVOTHYROXINE SODIUM 0.05 MG/1
50 TABLET ORAL DAILY
Qty: 45 TABLET | Refills: 2 | Status: SHIPPED | OUTPATIENT
Start: 2022-02-02 | End: 2022-05-02

## 2022-02-03 DIAGNOSIS — I10 ESSENTIAL (PRIMARY) HYPERTENSION: ICD-10-CM

## 2022-02-07 RX ORDER — CARVEDILOL 12.5 MG/1
TABLET ORAL
Qty: 90 TABLET | Refills: 1 | Status: SHIPPED | OUTPATIENT
Start: 2022-02-07 | End: 2023-02-16

## 2022-03-21 ENCOUNTER — TELEPHONE (OUTPATIENT)
Dept: FAMILY MEDICINE CLINIC | Facility: CLINIC | Age: 62
End: 2022-03-21

## 2022-03-21 ENCOUNTER — TELEPHONE (OUTPATIENT)
Dept: ORTHOPEDIC SURGERY | Facility: CLINIC | Age: 62
End: 2022-03-21

## 2022-03-21 NOTE — TELEPHONE ENCOUNTER
Caller: Jeyson Rose    Relationship to patient: Self    Best call back number: [phone]    Chief complaint: COVID TEST    Type of visit: OFFICE VISIT    Requested date: 04/01/2022-04/04/2022    Additional notes:   PATIENT WAS SEEN IN OFFICE ON 02/01/2022 FOR A SPOT ON HIS GUMS AND IT WAS TESTED FOR CANCER AND IT CAME BACK POSITIVE. HE IS SCHEDULED FOR SURGERY ON 04/05/2022 FOR THE REMOVAL OF IT ON HIS GUMS. HE IS NEEDING COVID TEST DONE 04/01/2022-04/04/2022 SO HE CAN HAVE THE SURGERY DONE.   HE IS REQUESTING CALL BACK TO SCHEDULE THE COVID TEST AS SOON AS POSSIBLE.

## 2022-03-21 NOTE — TELEPHONE ENCOUNTER
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

## 2022-03-22 DIAGNOSIS — Z01.818 PRE-OP TESTING: Primary | ICD-10-CM

## 2022-04-01 ENCOUNTER — LAB (OUTPATIENT)
Dept: FAMILY MEDICINE CLINIC | Facility: CLINIC | Age: 62
End: 2022-04-01

## 2022-04-01 DIAGNOSIS — Z01.818 PRE-OP TESTING: ICD-10-CM

## 2022-04-01 LAB — SARS-COV-2 RNA PNL SPEC NAA+PROBE: NOT DETECTED

## 2022-04-01 PROCEDURE — U0004 COV-19 TEST NON-CDC HGH THRU: HCPCS | Performed by: NURSE PRACTITIONER

## 2022-05-01 DIAGNOSIS — E03.8 SUBCLINICAL HYPOTHYROIDISM: ICD-10-CM

## 2022-05-02 DIAGNOSIS — I10 ESSENTIAL (PRIMARY) HYPERTENSION: ICD-10-CM

## 2022-05-02 RX ORDER — AMLODIPINE BESYLATE 10 MG/1
10 TABLET ORAL DAILY
Qty: 90 TABLET | Refills: 1 | Status: SHIPPED | OUTPATIENT
Start: 2022-05-02 | End: 2022-10-28

## 2022-05-02 RX ORDER — LEVOTHYROXINE SODIUM 0.05 MG/1
50 TABLET ORAL DAILY
Qty: 30 TABLET | Refills: 2 | Status: SHIPPED | OUTPATIENT
Start: 2022-05-02 | End: 2022-08-01

## 2022-05-03 ENCOUNTER — OFFICE VISIT (OUTPATIENT)
Dept: FAMILY MEDICINE CLINIC | Facility: CLINIC | Age: 62
End: 2022-05-03

## 2022-05-03 VITALS
OXYGEN SATURATION: 98 % | BODY MASS INDEX: 33.34 KG/M2 | TEMPERATURE: 96.9 F | WEIGHT: 212.4 LBS | SYSTOLIC BLOOD PRESSURE: 124 MMHG | DIASTOLIC BLOOD PRESSURE: 76 MMHG | HEART RATE: 54 BPM | HEIGHT: 67 IN

## 2022-05-03 DIAGNOSIS — E03.8 SUBCLINICAL HYPOTHYROIDISM: ICD-10-CM

## 2022-05-03 DIAGNOSIS — I10 ESSENTIAL (PRIMARY) HYPERTENSION: Primary | ICD-10-CM

## 2022-05-03 DIAGNOSIS — E66.9 OBESITY (BMI 30.0-34.9): ICD-10-CM

## 2022-05-03 DIAGNOSIS — K21.9 GASTROESOPHAGEAL REFLUX DISEASE WITHOUT ESOPHAGITIS: ICD-10-CM

## 2022-05-03 PROBLEM — M25.562 PAIN IN BOTH KNEES: Status: RESOLVED | Noted: 2019-08-20 | Resolved: 2022-05-03

## 2022-05-03 PROBLEM — M25.561 PAIN IN BOTH KNEES: Status: RESOLVED | Noted: 2019-08-20 | Resolved: 2022-05-03

## 2022-05-03 PROBLEM — M17.12 PRIMARY OSTEOARTHRITIS OF LEFT KNEE: Status: RESOLVED | Noted: 2021-08-25 | Resolved: 2022-05-03

## 2022-05-03 LAB
T4 FREE SERPL-MCNC: 1.36 NG/DL (ref 0.93–1.7)
TSH SERPL DL<=0.05 MIU/L-ACNC: 5.95 UIU/ML (ref 0.27–4.2)

## 2022-05-03 PROCEDURE — 84443 ASSAY THYROID STIM HORMONE: CPT | Performed by: NURSE PRACTITIONER

## 2022-05-03 PROCEDURE — 84439 ASSAY OF FREE THYROXINE: CPT | Performed by: NURSE PRACTITIONER

## 2022-05-03 PROCEDURE — 99214 OFFICE O/P EST MOD 30 MIN: CPT | Performed by: NURSE PRACTITIONER

## 2022-05-03 RX ORDER — PRAMIPEXOLE DIHYDROCHLORIDE 0.25 MG/1
0.25 TABLET ORAL NIGHTLY PRN
COMMUNITY
Start: 2022-03-29 | End: 2022-11-11

## 2022-05-03 RX ORDER — GABAPENTIN 300 MG/1
1 CAPSULE ORAL 2 TIMES DAILY
COMMUNITY

## 2022-05-03 RX ORDER — UBIDECARENONE 75 MG
1 CAPSULE ORAL DAILY
COMMUNITY
End: 2022-11-11

## 2022-05-03 RX ORDER — OMEGA-3S/DHA/EPA/FISH OIL/D3 300MG-1000
1 CAPSULE ORAL DAILY
COMMUNITY

## 2022-05-03 RX ORDER — OXYCODONE HYDROCHLORIDE AND ACETAMINOPHEN 5; 325 MG/1; MG/1
TABLET ORAL
COMMUNITY
Start: 2022-04-04 | End: 2022-05-03

## 2022-05-03 RX ORDER — LEVOTHYROXINE SODIUM 0.05 MG/1
TABLET ORAL
COMMUNITY
Start: 2022-04-22 | End: 2022-05-03 | Stop reason: SDUPTHER

## 2022-05-03 NOTE — PROGRESS NOTES
"Chief Complaint  Hypertension    Subjective          Jeyson Rose presents to [facility]  History of Present Illness  Presents today for a 3-month follow-up on hypertension, hypothyroidism, and GERD.  Overall he is doing well.  Recently had surgery on oral cancer.  History of squamous cell carcinoma of the head and neck.  Blood pressures well controlled.  Denies chest pain, syncope, palpitations.  Recently increased levothyroxine to 50 mcg daily.  Reflux well controlled with Nexium 40 mg daily.  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  Currently taking gabapentin 300 mg daily.  Also has restless leg syndrome.  He is taking Mirapex.  States Mirapex is not helping much with his restless leg and it keeps him up at night.  --  Objective   Vital Signs:   /76   Pulse 54   Temp 96.9 °F (36.1 °C)   Ht 170.2 cm (67\")   Wt 96.3 kg (212 lb 6.4 oz)   SpO2 98%   BMI 33.27 kg/m²     Physical Exam  Vitals reviewed.   Constitutional:       Appearance: Normal appearance. He is well-developed. He is obese.   HENT:      Head: Normocephalic and atraumatic.      Right Ear: External ear normal.      Left Ear: External ear normal.      Mouth/Throat:      Pharynx: No oropharyngeal exudate.   Eyes:      Conjunctiva/sclera: Conjunctivae normal.      Pupils: Pupils are equal, round, and reactive to light.   Cardiovascular:      Rate and Rhythm: Normal rate and regular rhythm.      Heart sounds: No murmur heard.    No friction rub. No gallop.   Pulmonary:      Effort: Pulmonary effort is normal.      Breath sounds: Normal breath sounds. No wheezing or rhonchi.   Abdominal:      General: Bowel sounds are normal. There is no distension.      Palpations: Abdomen is soft.      Tenderness: There is no abdominal tenderness.   Skin:     General: Skin is warm and dry.   Neurological:      Mental Status: He is alert and oriented to person, place, and time.   Psychiatric:         Mood and Affect: " Mood and affect normal.         Behavior: Behavior normal.         Thought Content: Thought content normal.         Judgment: Judgment normal.        Result Review :     Common labs    Common Labsle 10/28/21 10/28/21 10/28/21 10/28/21 2/1/22 2/1/22 4/5/22    1052 1052 1052 1054 1207 1207    Glucose  104 (A)    97    BUN  18    21    Creatinine  1.17    1.21    eGFR Non  Am  63    61    eGFR  Am  76    73    Sodium  139    142    Potassium  4.0    4.3    Chloride  101    104    Calcium  9.4    9.7    Albumin  4.50    4.20    Total Bilirubin  0.5    0.4    Alkaline Phosphatase  66    63    AST (SGOT)  22    21    ALT (SGPT)  22    20    WBC 4.35    7.42  5.75   Hemoglobin 14.5    14.8  15.4   Hematocrit 43.0    43.9  47.6   Platelets 199    204  193   Total Cholesterol   151       Triglycerides   40       HDL Cholesterol   82 (A)       LDL Cholesterol    60       Hemoglobin A1C    5.30      (A) Abnormal value            TSH    TSH 10/28/21 2/1/22   TSH 11.900 (A) 6.080 (A)   (A) Abnormal value                        Assessment and Plan    Diagnoses and all orders for this visit:    1. Essential (primary) hypertension (Primary)  Assessment & Plan:  Blood pressures well controlled.  Continue amlodipine 10 mg daily, losartan 100 mg daily      2. Gastroesophageal reflux disease without esophagitis  Assessment & Plan:  Reflux well controlled.  Continue Nexium 40 mg daily      3. Subclinical hypothyroidism  Assessment & Plan:  Check TSH and free T4 today.  TSH has decreased from 11 to 6 on levothyroxine 50 mcg daily    Orders:  -     TSH+Free T4    4. Obesity (BMI 30.0-34.9)  Assessment & Plan:  Patient's (Body mass index is 33.27 kg/m².) indicates that they are obese (BMI >30) with health conditions that include hypertension, coronary heart disease and GERD . Weight is improving with lifestyle modifications. BMI is is above average; BMI management plan is completed. We discussed portion control and increasing  exercise.  Since having oral surgery he is lost weight.               Follow Up   Return in about 3 months (around 8/3/2022), or if symptoms worsen or fail to improve, for Next scheduled follow up.  Patient was given instructions and counseling regarding his condition or for health maintenance advice. Please see specific information pulled into the AVS if appropriate.

## 2022-05-03 NOTE — ASSESSMENT & PLAN NOTE
Patient's (Body mass index is 33.27 kg/m².) indicates that they are obese (BMI >30) with health conditions that include hypertension, coronary heart disease and GERD . Weight is improving with lifestyle modifications. BMI is is above average; BMI management plan is completed. We discussed portion control and increasing exercise.  Since having oral surgery he is lost weight.

## 2022-06-03 ENCOUNTER — TELEPHONE (OUTPATIENT)
Dept: FAMILY MEDICINE CLINIC | Facility: CLINIC | Age: 62
End: 2022-06-03

## 2022-06-10 DIAGNOSIS — I10 ESSENTIAL (PRIMARY) HYPERTENSION: ICD-10-CM

## 2022-06-10 RX ORDER — LOSARTAN POTASSIUM 100 MG/1
100 TABLET ORAL DAILY
Qty: 90 TABLET | Refills: 1 | Status: SHIPPED | OUTPATIENT
Start: 2022-06-10 | End: 2022-12-07

## 2022-07-30 DIAGNOSIS — E03.8 SUBCLINICAL HYPOTHYROIDISM: ICD-10-CM

## 2022-08-01 RX ORDER — LEVOTHYROXINE SODIUM 0.05 MG/1
50 TABLET ORAL DAILY
Qty: 30 TABLET | Refills: 2 | Status: SHIPPED | OUTPATIENT
Start: 2022-08-01 | End: 2022-10-28

## 2022-08-02 ENCOUNTER — HOSPITAL ENCOUNTER (OUTPATIENT)
Dept: GENERAL RADIOLOGY | Facility: HOSPITAL | Age: 62
Discharge: HOME | End: 2022-08-02
Admitting: PHYSICIAN ASSISTANT

## 2022-08-02 ENCOUNTER — OFFICE VISIT (OUTPATIENT)
Dept: ORTHOPEDIC SURGERY | Facility: CLINIC | Age: 62
End: 2022-08-02

## 2022-08-02 VITALS — WEIGHT: 212 LBS | TEMPERATURE: 98.6 F | HEIGHT: 67 IN | BODY MASS INDEX: 33.27 KG/M2

## 2022-08-02 DIAGNOSIS — M17.12 PRIMARY OSTEOARTHRITIS OF LEFT KNEE: ICD-10-CM

## 2022-08-02 DIAGNOSIS — M17.12 PRIMARY OSTEOARTHRITIS OF LEFT KNEE: Primary | ICD-10-CM

## 2022-08-02 PROCEDURE — 99214 OFFICE O/P EST MOD 30 MIN: CPT | Performed by: PHYSICIAN ASSISTANT

## 2022-08-02 PROCEDURE — 73562 X-RAY EXAM OF KNEE 3: CPT

## 2022-08-02 RX ORDER — MELOXICAM 7.5 MG/1
15 TABLET ORAL ONCE
Status: CANCELLED | OUTPATIENT
Start: 2022-08-02 | End: 2022-08-02

## 2022-08-02 RX ORDER — POVIDONE-IODINE 10 MG/ML
1 SOLUTION TOPICAL ONCE
Status: CANCELLED | OUTPATIENT
Start: 2022-08-02 | End: 2022-08-02

## 2022-08-02 RX ORDER — ACETAMINOPHEN 325 MG/1
1000 TABLET ORAL ONCE
Status: CANCELLED | OUTPATIENT
Start: 2022-08-02 | End: 2022-08-02

## 2022-08-02 RX ORDER — CHLORHEXIDINE GLUCONATE 500 MG/1
CLOTH TOPICAL TAKE AS DIRECTED
Status: CANCELLED | OUTPATIENT
Start: 2022-08-02

## 2022-08-02 RX ORDER — PREGABALIN 150 MG/1
150 CAPSULE ORAL ONCE
Status: CANCELLED | OUTPATIENT
Start: 2022-08-02 | End: 2022-08-02

## 2022-08-02 NOTE — PROGRESS NOTES
"Chief Complaint  Follow-up and Pain of the Left Knee    Subjective    History of Present Illness      Jeyson Rose is a 62 y.o. male who presents to [facility] for follow up on left knee pain. He last saw Dr. Marsh for this complaint in October 2021. At that time he was given an intra-articular visco injection. He was not able to come back at the 6 months latoya due to his mandible cancer returning. He advised he wanted to get that taken care of before proceeding further with the knee. In the meantime the knee has significantly worsened and he is not able to sleep at night now. His pain is mostly over the medial aspect of the knee and is painful with walking and other daily activities. Today he returns to discuss options.         Objective   Vital Signs:   Temp 98.6 °F (37 °C)   Ht 170.2 cm (67\")   Wt 96.2 kg (212 lb)   BMI 33.20 kg/m²     Physical Exam  Vitals signs and nursing note reviewed.   Constitutional:       Appearance: Normal appearance.   Pulmonary:      Effort: Pulmonary effort is normal.   Skin:     General: Skin is warm and dry.      Capillary Refill: Capillary refill takes less than 2 seconds.   Neurological:      General: No focal deficit present.      Mental Status: He is alert and oriented to person, place, and time. Mental status is at baseline.   Psychiatric:         Mood and Affect: Mood normal.         Behavior: Behavior normal.         Thought Content: Thought content normal.         Judgment: Judgment normal.     Ortho Exam   LEFT knee  There is Moderate joint line tenderness at the medial aspect of the knee.   Positive for varus orientation of the knee.   Positive for crepitus throughout range of motion.   Negative for effusion.  Positive patellar grind test.   Negative Lachman test.    Negative anterior and posterior drawer.  Range of motion in extension and flexion is: 0-115 degrees.  Neurovascular status is intact.    Dorsalis pedis and posterior tibial " artery pulses are palpable.    Common peroneal nerve function is well preserved.  Gait is cautious and antalgic.      Result Review :   Radiologic studies - see below for interpretation  LEFT knee xrays  weightbearing/standing 3 views were ordered by Angel Russo PA-C. Performed at [facility] on 8/2/2022. Images were independently viewed and interpreted by myself, my impression as follows:  Findings: Moderate to severe tricompartmental osteoarthritis, medial compartment is more severe, whereas lateral and patellofemoral with more moderate change.  He has bone-on-bone articulation at the medial compartment.  Bony lesion: no  Soft tissues: within normal limits  Joint spaces: decreased  Hardware appropriately positioned: not applicable  Prior studies available for comparison: yes from 2/2021        PROCEDURE  Procedures           Assessment   Assessment and Plan    Problem List Items Addressed This Visit        Musculoskeletal and Injuries    Primary osteoarthritis of left knee - Primary    Relevant Orders    XR Knee 3 View Left (Completed)    Case Request (Completed)    COVID PRE-OP / PRE-PROCEDURE SCREENING ORDER (NO ISOLATION) - Swab, Nasopharynx    Follow Anesthesia Guidelines / Standing Orders    Obtain informed consent    Provide instructions to patient regarding NPO status    Provide Instructions/Handout For Bactroban And Chlorhexidine Shower (If Prescribed)    ECG 12 Lead    Type and screen    XR Knee 1 or 2 View Left          Follow Up   · Discussion of any imaging in detail. Discussion of orthopaedic goals.  · Risk, benefits, and merits of treatment alternatives reviewed with the patient. Treatment alternatives include: intra-articular steroid injection, bursal steroid injection and surgery. He would like to proceed with a LEFT TKA.   · Ice, heat, and/or modalities as beneficial  · The nature of the proposed surgery reviewed with the patient including risks, benefits,  rehabilitation, recovery timeframe, and outcome expectations.  The patient was seen today for preoperative discussion.  The patient has been tried on over-the-counter and prescription NSAID's despite the risks of anti-inflammatory bleeding, peptic ulcers and erosive gastritis with short term benefit only.  Braces have been prescribed for mechanical support.  Patient has been participating in an exercise program specifically targeting joint pain relief with limited benefit. Intraarticular injections have been used periodically with some but not complete relief of pain.  Ambulation aids have also been utilized.    · The details of the surgical procedure were explained including the location of probable incisions and a description of the likely hardware/grafts to be used. The patient understands the likely convalescence after surgery as well as the rehabilitation required.  Also, we have thoroughly discussed with the patient the risks, benefits and alternatives to surgery.  Risks include but are not limited to the risk of infection, joint stiffness, limited range of motion, wound healing problems, scar tissue build up, myocardial infarction, stroke, blood clots (including DVT and/or pulmonary embolus along with the risk of death) neurologic and/or vascular injury, limb length discrepancy, fracture, dislocation, nonunion, malunion, continued pain and need for further surgery including hardware failure requiring revision.    · Patient is encouraged to call or return for any issues or concerns.  • Patient was given instructions and counseling regarding his condition or for health maintenance advice. Please see specific information pulled into the AVS if appropriate.     Angel Russo PA-C   Date of Encounter: 8/2/2022   Electronically signed by Angel Russo PA-C, 08/02/22, 11:53 AM EDT.   Electronically signed by Angel Russo PA-C, 08/15/22, 8:32 PM EDT.     EMR Dragon/Transcription  disclaimer:  Much of this encounter note is an electronic transcription/translation of spoken language to printed text. The electronic translation of spoken language may permit erroneous, or at times, nonsensical words or phrases to be inadvertently transcribed; Although I have reviewed the note for such errors, some may still exist.

## 2022-08-10 ENCOUNTER — TELEPHONE (OUTPATIENT)
Dept: ORTHOPEDIC SURGERY | Facility: CLINIC | Age: 62
End: 2022-08-10

## 2022-08-10 NOTE — TELEPHONE ENCOUNTER
"Caller: PATIENT     Relationship to patient: SELF    Best call back number: [phone]    Patient is needing: PATIENT STATES THAT HIS KNEE \"WENT OUT\" ON HIM ABOUT 3:00 THIS MORNING. HE SAYS HIS KNEE IS \"LOCKED\" AND  HE CANNOT STRAIGHTEN HIS LEG OUT.  HE IS ALSO HAVING A LOT OF PAIN BEHIND HIS KNEE GOING DOWN TO HIS ANKLE. HE WOULD LIKE TO BE WORKED IN OR KNOW IF THERE IS ANYTHING HE CAN DO TO HELP THIS. PLEASE ADVISE. ATTEMPTED TO WT BUT THERE WAS NO ANSWER.           "

## 2022-08-10 NOTE — TELEPHONE ENCOUNTER
He is wanting to plan on surgery, so in that case I would not want to give him an injection as it would push surgery back. If that is still the case, I would recommend OTC NSAIDS or tylenol, brace/ace wrap, ice and rest. If he needs something more for pain I could try that as well.

## 2022-08-10 NOTE — TELEPHONE ENCOUNTER
CALLED PATIENT AND HE IS DEFINITELY WANTING TO PROCEED WITH SURGERY AS SOON AS POSSIBLE.  HE STATED THAT HE IS BASICALLY CONFINED TO A CHAIR TODAY.   HE IS UNABLE TO EXTEND HIS KNEE.  HE HAS A BRACE THAT HE WILL TRY AND WILL TAKE SOME TYLENOL AND/OR NSAIDS TO SEE IF THAT WILL HELP.  IF THEY DO NOT HELP, HE WILL CALL OUR OFFICE BACK TO HAVE [facility] SEND SOMETHING IN FOR HIM

## 2022-08-11 ENCOUNTER — OFFICE VISIT (OUTPATIENT)
Dept: FAMILY MEDICINE CLINIC | Facility: CLINIC | Age: 62
End: 2022-08-11

## 2022-08-11 VITALS
SYSTOLIC BLOOD PRESSURE: 112 MMHG | HEART RATE: 56 BPM | WEIGHT: 221 LBS | HEIGHT: 68 IN | OXYGEN SATURATION: 100 % | BODY MASS INDEX: 33.49 KG/M2 | TEMPERATURE: 97.8 F | DIASTOLIC BLOOD PRESSURE: 80 MMHG

## 2022-08-11 DIAGNOSIS — R60.9 PERIPHERAL EDEMA: ICD-10-CM

## 2022-08-11 DIAGNOSIS — E03.8 SUBCLINICAL HYPOTHYROIDISM: ICD-10-CM

## 2022-08-11 DIAGNOSIS — L30.9 DERMATITIS: Primary | ICD-10-CM

## 2022-08-11 DIAGNOSIS — I10 ESSENTIAL (PRIMARY) HYPERTENSION: ICD-10-CM

## 2022-08-11 LAB
ALBUMIN SERPL-MCNC: 4.5 G/DL (ref 3.5–5.2)
ALBUMIN/GLOB SERPL: 1.7 G/DL
ALP SERPL-CCNC: 64 U/L (ref 39–117)
ALT SERPL W P-5'-P-CCNC: 20 U/L (ref 1–41)
ANION GAP SERPL CALCULATED.3IONS-SCNC: 11 MMOL/L (ref 5–15)
AST SERPL-CCNC: 18 U/L (ref 1–40)
BILIRUB SERPL-MCNC: 0.4 MG/DL (ref 0–1.2)
BUN SERPL-MCNC: 15 MG/DL (ref 8–23)
BUN/CREAT SERPL: 11.4 (ref 7–25)
CALCIUM SPEC-SCNC: 9.9 MG/DL (ref 8.6–10.5)
CHLORIDE SERPL-SCNC: 103 MMOL/L (ref 98–107)
CO2 SERPL-SCNC: 26 MMOL/L (ref 22–29)
CREAT SERPL-MCNC: 1.32 MG/DL (ref 0.76–1.27)
EGFRCR SERPLBLD CKD-EPI 2021: 60.2 ML/MIN/1.73
GLOBULIN UR ELPH-MCNC: 2.7 GM/DL
GLUCOSE SERPL-MCNC: 89 MG/DL (ref 65–99)
POTASSIUM SERPL-SCNC: 4.1 MMOL/L (ref 3.5–5.2)
PROT SERPL-MCNC: 7.2 G/DL (ref 6–8.5)
SODIUM SERPL-SCNC: 140 MMOL/L (ref 136–145)
T4 FREE SERPL-MCNC: 1.24 NG/DL (ref 0.93–1.7)
TSH SERPL DL<=0.05 MIU/L-ACNC: 3.41 UIU/ML (ref 0.27–4.2)

## 2022-08-11 PROCEDURE — 84439 ASSAY OF FREE THYROXINE: CPT | Performed by: NURSE PRACTITIONER

## 2022-08-11 PROCEDURE — 80053 COMPREHEN METABOLIC PANEL: CPT | Performed by: NURSE PRACTITIONER

## 2022-08-11 PROCEDURE — 36415 COLL VENOUS BLD VENIPUNCTURE: CPT | Performed by: NURSE PRACTITIONER

## 2022-08-11 PROCEDURE — 99214 OFFICE O/P EST MOD 30 MIN: CPT | Performed by: NURSE PRACTITIONER

## 2022-08-11 PROCEDURE — 84443 ASSAY THYROID STIM HORMONE: CPT | Performed by: NURSE PRACTITIONER

## 2022-08-11 RX ORDER — TRIAMCINOLONE ACETONIDE 1 MG/G
1 CREAM TOPICAL 2 TIMES DAILY
Qty: 45 G | Refills: 1 | Status: SHIPPED | OUTPATIENT
Start: 2022-08-11 | End: 2022-11-11

## 2022-08-11 RX ORDER — HYDROXYZINE HYDROCHLORIDE 25 MG/1
25 TABLET, FILM COATED ORAL 3 TIMES DAILY PRN
Qty: 30 TABLET | Refills: 1 | Status: SHIPPED | OUTPATIENT
Start: 2022-08-11

## 2022-08-11 RX ORDER — FUROSEMIDE 20 MG/1
20 TABLET ORAL DAILY
Qty: 30 TABLET | Refills: 0 | Status: SHIPPED | OUTPATIENT
Start: 2022-08-11 | End: 2022-08-12

## 2022-08-11 RX ORDER — POTASSIUM CHLORIDE 1500 MG/1
20 TABLET, FILM COATED, EXTENDED RELEASE ORAL DAILY
Qty: 30 TABLET | Refills: 0 | Status: SHIPPED | OUTPATIENT
Start: 2022-08-11 | End: 2022-09-19

## 2022-08-11 NOTE — PROGRESS NOTES
"Chief Complaint  Hypothyroidism and Rash (Around ankles )    Subjective        Jeyson Rose presents to [facility]  History of Present Illness  Presents today for follow-up on hypertension and hypothyroidism.  Blood pressures well controlled.  Patient does report having some peripheral edema in his lower extremities.  He was previously on Lasix in the past.  Patient reports having some rash on his lower extremities mostly on his feet.  Reports that it is itchy.  He does note that when he had stopped taking his gabapentin the rash had improved.  Since resuming the gabapentin he reports that is slightly increased.      Objective   Vital Signs:  /80   Pulse 56   Temp 97.8 °F (36.6 °C)   Ht 172.7 cm (68\")   Wt 100 kg (221 lb)   SpO2 100%   BMI 33.60 kg/m²   Estimated body mass index is 33.6 kg/m² as calculated from the following:    Height as of this encounter: 172.7 cm (68\").    Weight as of this encounter: 100 kg (221 lb).          Physical Exam  Vitals reviewed.   Constitutional:       Appearance: Normal appearance. He is well-developed.   HENT:      Head: Normocephalic and atraumatic.      Right Ear: External ear normal.      Left Ear: External ear normal.      Mouth/Throat:      Pharynx: No oropharyngeal exudate.   Eyes:      Conjunctiva/sclera: Conjunctivae normal.      Pupils: Pupils are equal, round, and reactive to light.   Cardiovascular:      Rate and Rhythm: Normal rate and regular rhythm.      Heart sounds: No murmur heard.    No friction rub. No gallop.   Pulmonary:      Effort: Pulmonary effort is normal.      Breath sounds: Normal breath sounds. No wheezing or rhonchi.   Abdominal:      General: Bowel sounds are normal. There is no distension.      Palpations: Abdomen is soft.      Tenderness: There is no abdominal tenderness.   Musculoskeletal:      Right lower le+ Pitting Edema present.      Left lower le+ Pitting Edema present.   Skin:     " General: Skin is warm and dry.      Findings: Rash (on feet) present. Rash is papular.   Neurological:      Mental Status: He is alert and oriented to person, place, and time.      Cranial Nerves: No cranial nerve deficit.   Psychiatric:         Mood and Affect: Mood and affect normal.         Behavior: Behavior normal.         Thought Content: Thought content normal.         Judgment: Judgment normal.        Result Review :                Assessment and Plan   Diagnoses and all orders for this visit:    1. Dermatitis (Primary)  -     triamcinolone (KENALOG) 0.1 % cream; Apply 1 application topically to the appropriate area as directed 2 (Two) Times a Day.  Dispense: 45 g; Refill: 1    2. Peripheral edema  -     furosemide (Lasix) 20 MG tablet; Take 1 tablet by mouth Daily for 30 days.  Dispense: 30 tablet; Refill: 0    3. Essential (primary) hypertension  -     Comprehensive metabolic panel    4. Subclinical hypothyroidism  -     TSH+Free T4    Other orders  -     hydrOXYzine (ATARAX) 25 MG tablet; Take 1 tablet by mouth 3 (Three) Times a Day As Needed for Itching.  Dispense: 30 tablet; Refill: 1  -     potassium chloride (K-TAB) 20 MEQ tablet controlled-release ER tablet; Take 1 tablet by mouth Daily.  Dispense: 30 tablet; Refill: 0    Check a CMP and a TSH for hypertension hypothyroidism.  Start Lasix 20 mg daily with potassium supplement for the next month.  Apply triamcinolone cream twice daily for the rash on the lower extremities. May take hydroxyzine for itchiness.         Follow Up   Return in about 3 months (around 11/11/2022), or if symptoms worsen or fail to improve, for Next scheduled follow up.  Patient was given instructions and counseling regarding his condition or for health maintenance advice. Please see specific information pulled into the AVS if appropriate.

## 2022-08-12 RX ORDER — FUROSEMIDE 20 MG/1
TABLET ORAL
Qty: 90 TABLET | Refills: 0 | Status: SHIPPED | OUTPATIENT
Start: 2022-08-12

## 2022-08-15 ENCOUNTER — HOSPITAL ENCOUNTER (OUTPATIENT)
Dept: CARDIOLOGY | Facility: HOSPITAL | Age: 62
Discharge: HOME | End: 2022-08-15
Admitting: PHYSICIAN ASSISTANT

## 2022-08-15 ENCOUNTER — OUTSIDE FACILITY SERVICE (OUTPATIENT)
Dept: ORTHOPEDIC SURGERY | Facility: CLINIC | Age: 62
End: 2022-08-15

## 2022-08-15 ENCOUNTER — TRANSCRIBE ORDERS (OUTPATIENT)
Dept: ORTHOPEDIC SURGERY | Facility: CLINIC | Age: 62
End: 2022-08-15

## 2022-08-15 ENCOUNTER — READMISSION MANAGEMENT (OUTPATIENT)
Dept: CALL CENTER | Facility: HOSPITAL | Age: 62
End: 2022-08-15

## 2022-08-15 DIAGNOSIS — M17.12 ARTHRITIS OF LEFT KNEE: Primary | ICD-10-CM

## 2022-08-15 DIAGNOSIS — M17.12 ARTHRITIS OF LEFT KNEE: ICD-10-CM

## 2022-08-15 PROCEDURE — 93010 ELECTROCARDIOGRAM REPORT: CPT | Performed by: INTERNAL MEDICINE

## 2022-08-15 PROCEDURE — 99213 OFFICE O/P EST LOW 20 MIN: CPT | Performed by: ORTHOPAEDIC SURGERY

## 2022-08-15 PROCEDURE — 93005 ELECTROCARDIOGRAM TRACING: CPT | Performed by: PHYSICIAN ASSISTANT

## 2022-08-15 RX ORDER — OXYCODONE HYDROCHLORIDE AND ACETAMINOPHEN 5; 325 MG/1; MG/1
1 TABLET ORAL EVERY 6 HOURS PRN
COMMUNITY
End: 2022-08-31 | Stop reason: SDUPTHER

## 2022-08-15 RX ORDER — ASPIRIN 81 MG/1
81 TABLET ORAL DAILY
COMMUNITY

## 2022-08-15 NOTE — OUTREACH NOTE
Prep Survey    Flowsheet Row Responses   [facility] facility patient discharged from? Non-[facility]   Is LACE score < 7 ? Non-BH Discharge   Emergency Room discharge w/ pulse ox? No   Eligibility [facility]   Date of Discharge 08/15/22   Discharge Disposition Home or Self Care   Discharge diagnosis Unavailable   Does the patient have one of the following disease processes/diagnoses(primary or secondary)? Other   Prep survey completed? Yes          BANG PANDYA - Registered Nurse

## 2022-08-15 NOTE — PAT
Having EKG done at [facility] for surgery 8-23 with Dr. Marsh.  Called facility, registration verifies they see EKG order

## 2022-08-16 ENCOUNTER — TRANSITIONAL CARE MANAGEMENT TELEPHONE ENCOUNTER (OUTPATIENT)
Dept: CALL CENTER | Facility: HOSPITAL | Age: 62
End: 2022-08-16

## 2022-08-16 NOTE — OUTREACH NOTE
Call Center TCM Note    Flowsheet Row Responses   [facility] patient discharged from? Non-   Does the patient have one of the following disease processes/diagnoses(primary or secondary)? Other   TCM attempt successful? Yes   Call start time 0949   Call end time 0956   Discharge diagnosis Patient was admitted due to the pain of his knee- patient will have upcoming surgery for Total knee replacement   Person spoke with today (if not patient) and relationship Patient   Meds reviewed with patient/caregiver? Yes   Is the patient having any side effects they believe may be caused by any medication additions or changes? No   Does the patient have all medications ordered at discharge? Yes   Prescription comments Patients pain is now being controlled with pain medication- noted upcoming surgery   Is the patient taking all medications as directed (includes completed medication regime)? Yes   Does the patient have a primary care provider?  Yes   Does the patient have an appointment with their PCP within 7 days of discharge? Yes   Comments regarding PCP Scheduled hospital follow up and surgery follow up on 08/24 @ 1130   Has the patient kept scheduled appointments due by today? Yes   Comments Has Surgery this tuesday- L total knee replacement   Has home health visited the patient within 72 hours of discharge? N/A   Did the patient receive a copy of their discharge instructions? Yes   Nursing interventions Reviewed instructions with patient   What is the patient's perception of their health status since discharge? Improving   Is the patient/caregiver able to teach back signs and symptoms related to disease process for when to call PCP? Yes   Is the patient/caregiver able to teach back the hierarchy of who to call/visit for symptoms/problems? PCP, Specialist, Home health nurse, Urgent Care, ED, 911 Yes   TCM call completed? Yes          Marjorie Alexandre RN    8/16/2022, 09:56 EDT

## 2022-08-20 ENCOUNTER — LAB (OUTPATIENT)
Dept: LAB | Facility: HOSPITAL | Age: 62
End: 2022-08-20

## 2022-08-20 DIAGNOSIS — M17.12 PRIMARY OSTEOARTHRITIS OF LEFT KNEE: ICD-10-CM

## 2022-08-20 LAB
ABO GROUP BLD: NORMAL
BLD GP AB SCN SERPL QL: NEGATIVE
DEPRECATED RDW RBC AUTO: 44.6 FL (ref 37–54)
ERYTHROCYTE [DISTWIDTH] IN BLOOD BY AUTOMATED COUNT: 13.2 % (ref 12.3–15.4)
HCT VFR BLD AUTO: 45.2 % (ref 37.5–51)
HGB BLD-MCNC: 14.9 G/DL (ref 13–17.7)
MCH RBC QN AUTO: 30.3 PG (ref 26.6–33)
MCHC RBC AUTO-ENTMCNC: 33 G/DL (ref 31.5–35.7)
MCV RBC AUTO: 92.1 FL (ref 79–97)
MRSA DNA SPEC QL NAA+PROBE: NORMAL
PLATELET # BLD AUTO: 210 10*3/MM3 (ref 140–450)
PMV BLD AUTO: 10 FL (ref 6–12)
RBC # BLD AUTO: 4.91 10*6/MM3 (ref 4.14–5.8)
RH BLD: POSITIVE
SARS-COV-2 ORF1AB RESP QL NAA+PROBE: NOT DETECTED
T&S EXPIRATION DATE: NORMAL
WBC NRBC COR # BLD: 6.36 10*3/MM3 (ref 3.4–10.8)

## 2022-08-20 PROCEDURE — U0004 COV-19 TEST NON-CDC HGH THRU: HCPCS

## 2022-08-20 PROCEDURE — 86900 BLOOD TYPING SEROLOGIC ABO: CPT

## 2022-08-20 PROCEDURE — 87641 MR-STAPH DNA AMP PROBE: CPT

## 2022-08-20 PROCEDURE — 85027 COMPLETE CBC AUTOMATED: CPT

## 2022-08-20 PROCEDURE — 86850 RBC ANTIBODY SCREEN: CPT

## 2022-08-20 PROCEDURE — C9803 HOPD COVID-19 SPEC COLLECT: HCPCS

## 2022-08-20 PROCEDURE — 36415 COLL VENOUS BLD VENIPUNCTURE: CPT

## 2022-08-20 PROCEDURE — 86901 BLOOD TYPING SEROLOGIC RH(D): CPT

## 2022-08-22 ENCOUNTER — ANESTHESIA EVENT (OUTPATIENT)
Dept: PERIOP | Facility: HOSPITAL | Age: 62
End: 2022-08-22

## 2022-08-23 ENCOUNTER — ANESTHESIA (OUTPATIENT)
Dept: PERIOP | Facility: HOSPITAL | Age: 62
End: 2022-08-23

## 2022-08-23 ENCOUNTER — HOSPITAL ENCOUNTER (OUTPATIENT)
Facility: HOSPITAL | Age: 62
Discharge: HOME HEALTH | End: 2022-08-24
Attending: ORTHOPAEDIC SURGERY | Admitting: ORTHOPAEDIC SURGERY

## 2022-08-23 ENCOUNTER — APPOINTMENT (OUTPATIENT)
Dept: GENERAL RADIOLOGY | Facility: HOSPITAL | Age: 62
End: 2022-08-23

## 2022-08-23 DIAGNOSIS — Z96.652 S/P TKR (TOTAL KNEE REPLACEMENT), LEFT: Primary | ICD-10-CM

## 2022-08-23 DIAGNOSIS — M17.12 PRIMARY OSTEOARTHRITIS OF LEFT KNEE: ICD-10-CM

## 2022-08-23 PROCEDURE — 27447 TOTAL KNEE ARTHROPLASTY: CPT | Performed by: SPECIALIST/TECHNOLOGIST, OTHER

## 2022-08-23 PROCEDURE — 25010000002 CEFAZOLIN PER 500 MG: Performed by: PHYSICIAN ASSISTANT

## 2022-08-23 PROCEDURE — 63710000001 CARVEDILOL 6.25 MG TABLET: Performed by: ORTHOPAEDIC SURGERY

## 2022-08-23 PROCEDURE — S0260 H&P FOR SURGERY: HCPCS | Performed by: ORTHOPAEDIC SURGERY

## 2022-08-23 PROCEDURE — 63710000001 ACETAMINOPHEN 500 MG TABLET: Performed by: PHYSICIAN ASSISTANT

## 2022-08-23 PROCEDURE — C1776 JOINT DEVICE (IMPLANTABLE): HCPCS | Performed by: ORTHOPAEDIC SURGERY

## 2022-08-23 PROCEDURE — 0 LIDOCAINE 1 % SOLUTION: Performed by: ANESTHESIOLOGY

## 2022-08-23 PROCEDURE — 25010000002 DEXAMETHASONE PER 1 MG: Performed by: ANESTHESIOLOGY

## 2022-08-23 PROCEDURE — 27447 TOTAL KNEE ARTHROPLASTY: CPT | Performed by: ORTHOPAEDIC SURGERY

## 2022-08-23 PROCEDURE — 25010000002 CEFAZOLIN PER 500 MG: Performed by: ORTHOPAEDIC SURGERY

## 2022-08-23 PROCEDURE — 63710000001 POVIDONE-IODINE 10 % SOLUTION 118 ML BOTTLE: Performed by: ORTHOPAEDIC SURGERY

## 2022-08-23 PROCEDURE — 25010000002 ONDANSETRON PER 1 MG: Performed by: ANESTHESIOLOGY

## 2022-08-23 PROCEDURE — 25010000002 HYDROMORPHONE 1 MG/ML SOLUTION: Performed by: ANESTHESIOLOGY

## 2022-08-23 PROCEDURE — C9290 INJ, BUPIVACAINE LIPOSOME: HCPCS | Performed by: ORTHOPAEDIC SURGERY

## 2022-08-23 PROCEDURE — A9270 NON-COVERED ITEM OR SERVICE: HCPCS | Performed by: PHYSICIAN ASSISTANT

## 2022-08-23 PROCEDURE — G0378 HOSPITAL OBSERVATION PER HR: HCPCS

## 2022-08-23 PROCEDURE — 25010000002 ROPIVACAINE PER 1 MG: Performed by: ANESTHESIOLOGY

## 2022-08-23 PROCEDURE — 63710000001 MELOXICAM 15 MG TABLET: Performed by: PHYSICIAN ASSISTANT

## 2022-08-23 PROCEDURE — 25010000002 MIDAZOLAM PER 1 MG: Performed by: ANESTHESIOLOGY

## 2022-08-23 PROCEDURE — 76942 ECHO GUIDE FOR BIOPSY: CPT | Performed by: ORTHOPAEDIC SURGERY

## 2022-08-23 PROCEDURE — 63710000001 PREGABALIN 75 MG CAPSULE: Performed by: PHYSICIAN ASSISTANT

## 2022-08-23 PROCEDURE — 73560 X-RAY EXAM OF KNEE 1 OR 2: CPT

## 2022-08-23 PROCEDURE — A9270 NON-COVERED ITEM OR SERVICE: HCPCS | Performed by: ORTHOPAEDIC SURGERY

## 2022-08-23 PROCEDURE — 63710000001 RIVAROXABAN 10 MG TABLET: Performed by: ORTHOPAEDIC SURGERY

## 2022-08-23 PROCEDURE — 20985 CPTR-ASST DIR MS PX: CPT | Performed by: ORTHOPAEDIC SURGERY

## 2022-08-23 PROCEDURE — 97162 PT EVAL MOD COMPLEX 30 MIN: CPT

## 2022-08-23 PROCEDURE — 25010000002 PROPOFOL 10 MG/ML EMULSION: Performed by: ANESTHESIOLOGY

## 2022-08-23 PROCEDURE — 25010000002 FENTANYL CITRATE (PF) 100 MCG/2ML SOLUTION: Performed by: ANESTHESIOLOGY

## 2022-08-23 PROCEDURE — C1713 ANCHOR/SCREW BN/BN,TIS/BN: HCPCS | Performed by: ORTHOPAEDIC SURGERY

## 2022-08-23 PROCEDURE — 25010000002 ROPIVACAINE PER 1 MG: Performed by: ORTHOPAEDIC SURGERY

## 2022-08-23 PROCEDURE — 0 BUPIVACAINE LIPOSOME 1.3 % SUSPENSION: Performed by: ORTHOPAEDIC SURGERY

## 2022-08-23 DEVICE — STEM TIB/KN PERSONA CMT 5D SZF LT: Type: IMPLANTABLE DEVICE | Site: KNEE | Status: FUNCTIONAL

## 2022-08-23 DEVICE — CAP EXT STEM KN UPCHRG: Type: IMPLANTABLE DEVICE | Site: KNEE | Status: FUNCTIONAL

## 2022-08-23 DEVICE — EXT STEM FEM/KN PERSONA TPR 14XPLS30MM: Type: IMPLANTABLE DEVICE | Site: KNEE | Status: FUNCTIONAL

## 2022-08-23 DEVICE — CMT BONE REFOBACIN R W/GENT 1X40: Type: IMPLANTABLE DEVICE | Site: KNEE | Status: FUNCTIONAL

## 2022-08-23 DEVICE — DEV WND/CLS CONTRL TISS STRATAFIX SYMM PDS PLS CTX 60CM VIL: Type: IMPLANTABLE DEVICE | Site: KNEE | Status: FUNCTIONAL

## 2022-08-23 DEVICE — DEV CONTRL TISS STRATAFIX SPIRAL MNCRYL UD 3/0 PLS 30CM: Type: IMPLANTABLE DEVICE | Site: KNEE | Status: FUNCTIONAL

## 2022-08-23 DEVICE — PAT KN PERSONA VE CRS/LNK CMT 9.5X38MM: Type: IMPLANTABLE DEVICE | Site: KNEE | Status: FUNCTIONAL

## 2022-08-23 DEVICE — COMP FEM/KN PERSONA CR CMT COCR STD SZ10 LT: Type: IMPLANTABLE DEVICE | Site: KNEE | Status: FUNCTIONAL

## 2022-08-23 DEVICE — CAP TOTL KN CMT PRIMARY W/ROSA: Type: IMPLANTABLE DEVICE | Site: KNEE | Status: FUNCTIONAL

## 2022-08-23 RX ORDER — MIDAZOLAM HYDROCHLORIDE 1 MG/ML
INJECTION INTRAMUSCULAR; INTRAVENOUS AS NEEDED
Status: DISCONTINUED | OUTPATIENT
Start: 2022-08-23 | End: 2022-08-23 | Stop reason: SURG

## 2022-08-23 RX ORDER — MELOXICAM 15 MG/1
15 TABLET ORAL
Status: DISCONTINUED | OUTPATIENT
Start: 2022-08-24 | End: 2022-08-24

## 2022-08-23 RX ORDER — LOSARTAN POTASSIUM 50 MG/1
100 TABLET ORAL DAILY
Status: DISCONTINUED | OUTPATIENT
Start: 2022-08-24 | End: 2022-08-24

## 2022-08-23 RX ORDER — POTASSIUM CHLORIDE 750 MG/1
20 TABLET, FILM COATED, EXTENDED RELEASE ORAL
Refills: 0 | Status: DISCONTINUED | OUTPATIENT
Start: 2022-08-24 | End: 2022-08-24 | Stop reason: HOSPADM

## 2022-08-23 RX ORDER — MIDAZOLAM HYDROCHLORIDE 1 MG/ML
1 INJECTION INTRAMUSCULAR; INTRAVENOUS
Status: DISCONTINUED | OUTPATIENT
Start: 2022-08-23 | End: 2022-08-23 | Stop reason: HOSPADM

## 2022-08-23 RX ORDER — LABETALOL HYDROCHLORIDE 5 MG/ML
5 INJECTION, SOLUTION INTRAVENOUS
Status: DISCONTINUED | OUTPATIENT
Start: 2022-08-23 | End: 2022-08-23 | Stop reason: HOSPADM

## 2022-08-23 RX ORDER — PREGABALIN 75 MG/1
150 CAPSULE ORAL ONCE
Status: COMPLETED | OUTPATIENT
Start: 2022-08-23 | End: 2022-08-23

## 2022-08-23 RX ORDER — SODIUM CHLORIDE, SODIUM LACTATE, POTASSIUM CHLORIDE, CALCIUM CHLORIDE 600; 310; 30; 20 MG/100ML; MG/100ML; MG/100ML; MG/100ML
9 INJECTION, SOLUTION INTRAVENOUS CONTINUOUS PRN
Status: DISCONTINUED | OUTPATIENT
Start: 2022-08-23 | End: 2022-08-23 | Stop reason: HOSPADM

## 2022-08-23 RX ORDER — ACETAMINOPHEN 500 MG
1000 TABLET ORAL ONCE
Status: COMPLETED | OUTPATIENT
Start: 2022-08-23 | End: 2022-08-23

## 2022-08-23 RX ORDER — FLUMAZENIL 0.1 MG/ML
0.1 INJECTION INTRAVENOUS AS NEEDED
Status: DISCONTINUED | OUTPATIENT
Start: 2022-08-23 | End: 2022-08-23 | Stop reason: HOSPADM

## 2022-08-23 RX ORDER — AMLODIPINE BESYLATE 5 MG/1
10 TABLET ORAL DAILY
Status: DISCONTINUED | OUTPATIENT
Start: 2022-08-24 | End: 2022-08-24 | Stop reason: HOSPADM

## 2022-08-23 RX ORDER — ONDANSETRON 2 MG/ML
4 INJECTION INTRAMUSCULAR; INTRAVENOUS ONCE AS NEEDED
Status: DISCONTINUED | OUTPATIENT
Start: 2022-08-23 | End: 2022-08-23 | Stop reason: HOSPADM

## 2022-08-23 RX ORDER — FUROSEMIDE 20 MG/1
20 TABLET ORAL DAILY
Status: DISCONTINUED | OUTPATIENT
Start: 2022-08-24 | End: 2022-08-24

## 2022-08-23 RX ORDER — OXYCODONE HYDROCHLORIDE 5 MG/1
5 TABLET ORAL ONCE AS NEEDED
Status: DISCONTINUED | OUTPATIENT
Start: 2022-08-23 | End: 2022-08-23 | Stop reason: HOSPADM

## 2022-08-23 RX ORDER — ROPIVACAINE HYDROCHLORIDE 5 MG/ML
INJECTION, SOLUTION EPIDURAL; INFILTRATION; PERINEURAL
Status: COMPLETED | OUTPATIENT
Start: 2022-08-23 | End: 2022-08-23

## 2022-08-23 RX ORDER — ROPIVACAINE HYDROCHLORIDE 5 MG/ML
INJECTION, SOLUTION EPIDURAL; INFILTRATION; PERINEURAL AS NEEDED
Status: DISCONTINUED | OUTPATIENT
Start: 2022-08-23 | End: 2022-08-23 | Stop reason: HOSPADM

## 2022-08-23 RX ORDER — HYDROXYZINE HYDROCHLORIDE 25 MG/1
25 TABLET, FILM COATED ORAL 3 TIMES DAILY PRN
Status: DISCONTINUED | OUTPATIENT
Start: 2022-08-23 | End: 2022-08-24 | Stop reason: HOSPADM

## 2022-08-23 RX ORDER — ONDANSETRON 4 MG/1
4 TABLET, FILM COATED ORAL EVERY 6 HOURS PRN
Qty: 30 TABLET | Refills: 0 | Status: SHIPPED | OUTPATIENT
Start: 2022-08-23 | End: 2023-03-08

## 2022-08-23 RX ORDER — LEVOTHYROXINE SODIUM 0.05 MG/1
50 TABLET ORAL
Status: DISCONTINUED | OUTPATIENT
Start: 2022-08-24 | End: 2022-08-24 | Stop reason: HOSPADM

## 2022-08-23 RX ORDER — SODIUM CHLORIDE, SODIUM LACTATE, POTASSIUM CHLORIDE, CALCIUM CHLORIDE 600; 310; 30; 20 MG/100ML; MG/100ML; MG/100ML; MG/100ML
125 INJECTION, SOLUTION INTRAVENOUS CONTINUOUS
Status: DISCONTINUED | OUTPATIENT
Start: 2022-08-23 | End: 2022-08-24 | Stop reason: HOSPADM

## 2022-08-23 RX ORDER — KETAMINE HCL IN NACL, ISO-OSM 100MG/10ML
SYRINGE (ML) INJECTION AS NEEDED
Status: DISCONTINUED | OUTPATIENT
Start: 2022-08-23 | End: 2022-08-23 | Stop reason: SURG

## 2022-08-23 RX ORDER — LIDOCAINE HYDROCHLORIDE 10 MG/ML
INJECTION, SOLUTION INFILTRATION; PERINEURAL AS NEEDED
Status: DISCONTINUED | OUTPATIENT
Start: 2022-08-23 | End: 2022-08-23 | Stop reason: SURG

## 2022-08-23 RX ORDER — NALOXONE HCL 0.4 MG/ML
0.4 VIAL (ML) INJECTION AS NEEDED
Status: DISCONTINUED | OUTPATIENT
Start: 2022-08-23 | End: 2022-08-23 | Stop reason: HOSPADM

## 2022-08-23 RX ORDER — OXYCODONE HYDROCHLORIDE 5 MG/1
10 TABLET ORAL EVERY 4 HOURS PRN
Status: DISCONTINUED | OUTPATIENT
Start: 2022-08-23 | End: 2022-08-23 | Stop reason: HOSPADM

## 2022-08-23 RX ORDER — DIPHENHYDRAMINE HYDROCHLORIDE 50 MG/ML
12.5 INJECTION INTRAMUSCULAR; INTRAVENOUS
Status: DISCONTINUED | OUTPATIENT
Start: 2022-08-23 | End: 2022-08-23 | Stop reason: HOSPADM

## 2022-08-23 RX ORDER — POVIDONE-IODINE 10 MG/ML
1 SOLUTION TOPICAL ONCE
Status: COMPLETED | OUTPATIENT
Start: 2022-08-23 | End: 2022-08-23

## 2022-08-23 RX ORDER — ONDANSETRON 2 MG/ML
4 INJECTION INTRAMUSCULAR; INTRAVENOUS EVERY 6 HOURS PRN
Status: DISCONTINUED | OUTPATIENT
Start: 2022-08-23 | End: 2022-08-24 | Stop reason: HOSPADM

## 2022-08-23 RX ORDER — IPRATROPIUM BROMIDE AND ALBUTEROL SULFATE 2.5; .5 MG/3ML; MG/3ML
3 SOLUTION RESPIRATORY (INHALATION) ONCE AS NEEDED
Status: DISCONTINUED | OUTPATIENT
Start: 2022-08-23 | End: 2022-08-23 | Stop reason: HOSPADM

## 2022-08-23 RX ORDER — OXYCODONE HYDROCHLORIDE 5 MG/1
10 TABLET ORAL EVERY 4 HOURS PRN
Status: DISCONTINUED | OUTPATIENT
Start: 2022-08-23 | End: 2022-08-24 | Stop reason: HOSPADM

## 2022-08-23 RX ORDER — ONDANSETRON 4 MG/1
4 TABLET, FILM COATED ORAL EVERY 6 HOURS PRN
Status: DISCONTINUED | OUTPATIENT
Start: 2022-08-23 | End: 2022-08-24 | Stop reason: HOSPADM

## 2022-08-23 RX ORDER — ONDANSETRON 2 MG/ML
INJECTION INTRAMUSCULAR; INTRAVENOUS AS NEEDED
Status: DISCONTINUED | OUTPATIENT
Start: 2022-08-23 | End: 2022-08-23 | Stop reason: SURG

## 2022-08-23 RX ORDER — OXYCODONE HYDROCHLORIDE 5 MG/1
7.5 TABLET ORAL EVERY 4 HOURS PRN
Status: DISCONTINUED | OUTPATIENT
Start: 2022-08-23 | End: 2022-08-23 | Stop reason: SDUPTHER

## 2022-08-23 RX ORDER — SODIUM CHLORIDE 0.9 % (FLUSH) 0.9 %
10 SYRINGE (ML) INJECTION EVERY 12 HOURS SCHEDULED
Status: DISCONTINUED | OUTPATIENT
Start: 2022-08-23 | End: 2022-08-23 | Stop reason: HOSPADM

## 2022-08-23 RX ORDER — TRANEXAMIC ACID 10 MG/ML
INJECTION, SOLUTION INTRAVENOUS AS NEEDED
Status: DISCONTINUED | OUTPATIENT
Start: 2022-08-23 | End: 2022-08-23 | Stop reason: SURG

## 2022-08-23 RX ORDER — DEXAMETHASONE SODIUM PHOSPHATE 4 MG/ML
INJECTION, SOLUTION INTRA-ARTICULAR; INTRALESIONAL; INTRAMUSCULAR; INTRAVENOUS; SOFT TISSUE
Status: COMPLETED | OUTPATIENT
Start: 2022-08-23 | End: 2022-08-23

## 2022-08-23 RX ORDER — MELOXICAM 15 MG/1
15 TABLET ORAL ONCE
Status: COMPLETED | OUTPATIENT
Start: 2022-08-23 | End: 2022-08-23

## 2022-08-23 RX ORDER — CARVEDILOL 6.25 MG/1
12.5 TABLET ORAL 2 TIMES DAILY WITH MEALS
Status: DISCONTINUED | OUTPATIENT
Start: 2022-08-23 | End: 2022-08-24 | Stop reason: HOSPADM

## 2022-08-23 RX ORDER — PROMETHAZINE HYDROCHLORIDE 25 MG/1
25 TABLET ORAL ONCE AS NEEDED
Status: DISCONTINUED | OUTPATIENT
Start: 2022-08-23 | End: 2022-08-23 | Stop reason: HOSPADM

## 2022-08-23 RX ORDER — PROMETHAZINE HYDROCHLORIDE 25 MG/1
25 SUPPOSITORY RECTAL ONCE AS NEEDED
Status: DISCONTINUED | OUTPATIENT
Start: 2022-08-23 | End: 2022-08-23 | Stop reason: HOSPADM

## 2022-08-23 RX ORDER — PROPOFOL 10 MG/ML
VIAL (ML) INTRAVENOUS AS NEEDED
Status: DISCONTINUED | OUTPATIENT
Start: 2022-08-23 | End: 2022-08-23 | Stop reason: SURG

## 2022-08-23 RX ORDER — SODIUM CHLORIDE 0.9 % (FLUSH) 0.9 %
10 SYRINGE (ML) INJECTION AS NEEDED
Status: DISCONTINUED | OUTPATIENT
Start: 2022-08-23 | End: 2022-08-23 | Stop reason: HOSPADM

## 2022-08-23 RX ORDER — ONDANSETRON 4 MG/1
4 TABLET, FILM COATED ORAL EVERY 6 HOURS PRN
Qty: 30 TABLET | Refills: 0 | Status: CANCELLED | OUTPATIENT
Start: 2022-08-23

## 2022-08-23 RX ORDER — NALOXONE HCL 0.4 MG/ML
0.4 VIAL (ML) INJECTION
Status: DISCONTINUED | OUTPATIENT
Start: 2022-08-23 | End: 2022-08-24 | Stop reason: HOSPADM

## 2022-08-23 RX ORDER — DEXAMETHASONE SODIUM PHOSPHATE 4 MG/ML
INJECTION, SOLUTION INTRA-ARTICULAR; INTRALESIONAL; INTRAMUSCULAR; INTRAVENOUS; SOFT TISSUE AS NEEDED
Status: DISCONTINUED | OUTPATIENT
Start: 2022-08-23 | End: 2022-08-23 | Stop reason: SURG

## 2022-08-23 RX ORDER — FENTANYL CITRATE 50 UG/ML
INJECTION, SOLUTION INTRAMUSCULAR; INTRAVENOUS AS NEEDED
Status: DISCONTINUED | OUTPATIENT
Start: 2022-08-23 | End: 2022-08-23 | Stop reason: SURG

## 2022-08-23 RX ADMIN — PROPOFOL INJECTABLE EMULSION 150 MCG/KG/MIN: 10 INJECTION, EMULSION INTRAVENOUS at 11:58

## 2022-08-23 RX ADMIN — SODIUM CHLORIDE, POTASSIUM CHLORIDE, SODIUM LACTATE AND CALCIUM CHLORIDE 75 ML/HR: 600; 310; 30; 20 INJECTION, SOLUTION INTRAVENOUS at 22:04

## 2022-08-23 RX ADMIN — RIVAROXABAN 10 MG: 10 TABLET, FILM COATED ORAL at 23:03

## 2022-08-23 RX ADMIN — PROPOFOL INJECTABLE EMULSION 175 MCG/KG/MIN: 10 INJECTION, EMULSION INTRAVENOUS at 13:45

## 2022-08-23 RX ADMIN — MIDAZOLAM 2 MG: 1 INJECTION INTRAMUSCULAR; INTRAVENOUS at 10:40

## 2022-08-23 RX ADMIN — PREGABALIN 150 MG: 75 CAPSULE ORAL at 10:17

## 2022-08-23 RX ADMIN — MELOXICAM 15 MG: 15 TABLET ORAL at 10:17

## 2022-08-23 RX ADMIN — CARVEDILOL 12.5 MG: 6.25 TABLET, FILM COATED ORAL at 19:55

## 2022-08-23 RX ADMIN — DEXAMETHASONE SODIUM PHOSPHATE 4 MG: 4 INJECTION, SOLUTION INTRAMUSCULAR; INTRAVENOUS at 12:11

## 2022-08-23 RX ADMIN — FENTANYL CITRATE 50 MCG: 50 INJECTION, SOLUTION INTRAMUSCULAR; INTRAVENOUS at 11:55

## 2022-08-23 RX ADMIN — ONDANSETRON 4 MG: 2 INJECTION INTRAMUSCULAR; INTRAVENOUS at 14:20

## 2022-08-23 RX ADMIN — TRANEXAMIC ACID 1000 MG: 10 INJECTION, SOLUTION INTRAVENOUS at 12:16

## 2022-08-23 RX ADMIN — ACETAMINOPHEN 1000 MG: 500 TABLET ORAL at 10:17

## 2022-08-23 RX ADMIN — ROPIVACAINE HYDROCHLORIDE 30 ML: 5 INJECTION EPIDURAL; INFILTRATION; PERINEURAL at 10:45

## 2022-08-23 RX ADMIN — FENTANYL CITRATE 25 MCG: 50 INJECTION, SOLUTION INTRAMUSCULAR; INTRAVENOUS at 12:42

## 2022-08-23 RX ADMIN — Medication 25 MG: at 12:35

## 2022-08-23 RX ADMIN — Medication 25 MG: at 12:24

## 2022-08-23 RX ADMIN — SODIUM CHLORIDE, POTASSIUM CHLORIDE, SODIUM LACTATE AND CALCIUM CHLORIDE 9 ML/HR: 600; 310; 30; 20 INJECTION, SOLUTION INTRAVENOUS at 10:16

## 2022-08-23 RX ADMIN — FENTANYL CITRATE 25 MCG: 50 INJECTION, SOLUTION INTRAMUSCULAR; INTRAVENOUS at 12:35

## 2022-08-23 RX ADMIN — POVIDONE-IODINE 1 APPLICATION: 10 SOLUTION TOPICAL at 09:40

## 2022-08-23 RX ADMIN — HYDROMORPHONE HYDROCHLORIDE 0.5 MG: 1 INJECTION, SOLUTION INTRAMUSCULAR; INTRAVENOUS; SUBCUTANEOUS at 14:47

## 2022-08-23 RX ADMIN — DEXAMETHASONE SODIUM PHOSPHATE 4 MG: 4 INJECTION, SOLUTION INTRA-ARTICULAR; INTRALESIONAL; INTRAMUSCULAR; INTRAVENOUS; SOFT TISSUE at 10:45

## 2022-08-23 RX ADMIN — PROPOFOL 170 MG: 10 INJECTION, EMULSION INTRAVENOUS at 11:55

## 2022-08-23 RX ADMIN — HYDROMORPHONE HYDROCHLORIDE 0.5 MG: 1 INJECTION, SOLUTION INTRAMUSCULAR; INTRAVENOUS; SUBCUTANEOUS at 15:24

## 2022-08-23 RX ADMIN — LIDOCAINE HYDROCHLORIDE 50 MG: 10 INJECTION, SOLUTION INFILTRATION; PERINEURAL at 11:55

## 2022-08-23 RX ADMIN — CEFAZOLIN 2 G: 2 INJECTION, POWDER, FOR SOLUTION INTRAMUSCULAR; INTRAVENOUS at 19:55

## 2022-08-23 RX ADMIN — CEFAZOLIN 2 G: 2 INJECTION, POWDER, FOR SOLUTION INTRAMUSCULAR; INTRAVENOUS at 12:04

## 2022-08-23 NOTE — ANESTHESIA PREPROCEDURE EVALUATION
Anesthesia Evaluation     Patient summary reviewed and Nursing notes reviewed   history of anesthetic complications: PONV  NPO Solid Status: > 8 hours  NPO Liquid Status: > 2 hours           Airway   Dental      Pulmonary    (+) a smoker Former,   Cardiovascular     ECG reviewed  PT is on anticoagulation therapy  Patient on routine beta blocker    (+) hypertension, past MI , CAD, dysrhythmias Bradycardia, Paroxysmal Atrial Fib,       Neuro/Psych  (+) weakness, numbness,    GI/Hepatic/Renal/Endo    (+) obesity,  GERD,  thyroid problem hypothyroidism    Musculoskeletal     (+) chronic pain, gait problem,   Abdominal    Substance History      OB/GYN          Other   arthritis,    history of cancer    ROS/Med Hx Other: Lymphoma, tonsillar cancer, mandible cancer, SCC head/neck, neuropathy, edema, knee pain, decreased memory, RLS, pseudoaneurysm femoral    PSH  NECK SURGERY CARDIAC CATHETERIZATION  TONSILLECTOMY HERNIA REPAIR  COLONOSCOPY EYE SURGERY  TOTAL KNEE ARTHROPLASTY OTHER SURGICAL HISTORY  JOINT REPLACEMENT                   Anesthesia Plan    ASA 3     general with block and ERAS Protocol   total IV anesthesia  (Patient identified; pre-operative vital signs, all relevant labs/studies, complete medical/surgical/anesthetic history, full medication list, full allergy list, and NPO status obtained/reviewed; physical assessment performed; anesthetic options, side effects, potential complications, risks, and benefits discussed; questions answered; written anesthesia consent obtained; patient cleared for procedure; anesthesia machine and equipment checked and functioning)  intravenous induction     Anesthetic plan, risks, benefits, and alternatives have been provided, discussed and informed consent has been obtained with: patient.        CODE STATUS:

## 2022-08-23 NOTE — ANESTHESIA POSTPROCEDURE EVALUATION
Patient: Jeyson Rose    Procedure Summary     Date: 08/23/22 Room / Location: [facility] OR 06 / [facility] MAIN OR    Anesthesia Start: 1149 Anesthesia Stop: 1442    Procedure: Left total knee arthroplasty with robotics (Left Knee) Diagnosis:       Primary osteoarthritis of left knee      (Primary osteoarthritis of left knee [M17.12])    Surgeons: Song Marsh MD Provider: Toño Quinteros MD    Anesthesia Type: general with block, ERAS Protocol ASA Status: 3          Anesthesia Type: general with block, ERAS Protocol    Vitals  Vitals Value Taken Time   /91 08/23/22 1710   Temp 97.2 °F (36.2 °C) 08/23/22 1438   Pulse 74 08/23/22 1722   Resp 17 08/23/22 1700   SpO2 88 % 08/23/22 1722   Vitals shown include unvalidated device data.        Post Anesthesia Care and Evaluation    Patient location during evaluation: PACU  Patient participation: complete - patient participated  Level of consciousness: awake  Pain scale: See nurse's notes for pain score.  Pain management: adequate    Airway patency: patent  Anesthetic complications: No anesthetic complications  PONV Status: none  Cardiovascular status: acceptable  Respiratory status: acceptable and spontaneous ventilation  Hydration status: acceptable    Comments: Patient seen and examined postoperatively; vital signs stable; SpO2 greater than or equal to 90%; cardiopulmonary status stable; nausea/vomiting adequately controlled; pain adequately controlled; no apparent anesthesia complications; patient discharged from anesthesia care when discharge criteria were met

## 2022-08-23 NOTE — H&P
History & Physical       Patient: Jeyson Rose    Date of Admission: 8/23/2022  8:54 AM    YOB: 1960    Medical Record Number: [id number]    Attending Physician: Song Marsh MD        Chief Complaints: Primary osteoarthritis of left knee [M17.12]      History of Present Illness: 62 y.o. male presents with Primary osteoarthritis of left knee [M17.12]. Onset of symptoms was gradual and slowly progressively worse over the past 6 months.  Symptoms are associated with pain and discomfort over the medial aspect of the left knee associated with a limp.  Symptoms are aggravated by deep flexion of the knee.   Symptoms improve with anti-inflammatory medication and use of her brace. Patient is now being admitted to the services of Song Marsh MD for further evaluation and treatment.        Allergies   Allergen Reactions   • Bupropion Other (See Comments)     Rash          Home Medications:  Medications Prior to Admission   Medication Sig Dispense Refill Last Dose   • amitriptyline (ELAVIL) 25 MG tablet Take 50 mg by mouth Every Night.      • amLODIPine (NORVASC) 10 MG tablet TAKE 1 TABLET BY MOUTH DAILY (Patient taking differently: Take 10 mg by mouth Every Morning.) 90 tablet 1    • aspirin 81 MG EC tablet Take 81 mg by mouth Daily.   8/12/2022   • esomeprazole (nexIUM) 40 MG capsule Take 1 capsule by mouth Every Morning Before Breakfast. 90 capsule 1    • furosemide (LASIX) 20 MG tablet TAKE 1 TABLET BY MOUTH DAILY (Patient taking differently: Take 20 mg by mouth Every Morning.) 90 tablet 0    • gabapentin (NEURONTIN) 300 MG capsule Take 1 capsule by mouth 2 (Two) Times a Day.      • hydrOXYzine (ATARAX) 25 MG tablet Take 1 tablet by mouth 3 (Three) Times a Day As Needed for Itching. 30 tablet 1    • levothyroxine (SYNTHROID, LEVOTHROID) 50 MCG tablet TAKE 1 TABLET BY MOUTH DAILY (Patient taking differently: Take 50 mcg by mouth Every Morning.) 30 tablet 2    • losartan (COZAAR) 100 MG tablet TAKE 1  "TABLET BY MOUTH DAILY (Patient taking differently: Take 100 mg by mouth Every Morning.) 90 tablet 1    • oxyCODONE-acetaminophen (PERCOCET) 5-325 MG per tablet Take 1 tablet by mouth Every 6 (Six) Hours As Needed.      • potassium chloride (K-TAB) 20 MEQ tablet controlled-release ER tablet Take 1 tablet by mouth Daily. 30 tablet 0    • pramipexole (MIRAPEX) 0.25 MG tablet Take 0.25 mg by mouth At Night As Needed.      • triamcinolone (KENALOG) 0.1 % cream Apply 1 application topically to the appropriate area as directed 2 (Two) Times a Day. 45 g 1    • vitamin B-12 (CYANOCOBALAMIN) 100 MCG tablet Take 1 tablet by mouth Daily.      • carvedilol (COREG) 12.5 MG tablet TAKE 1 TABLET BY MOUTH TWICE DAILY WITH MEALS (Patient taking differently: Take 12.5 mg by mouth 2 (Two) Times a Day With Meals.) 90 tablet 1    • cholecalciferol (VITAMIN D3) 10 MCG (400 UNIT) tablet Take 1 tablet by mouth Daily.   7/25/2022       Current Medications:  Scheduled Meds:acetaminophen, 1,000 mg, Oral, Once  ceFAZolin, 2 g, Intravenous, Once  meloxicam, 15 mg, Oral, Once  povidone-iodine, 1 application, Nasal, Once  pregabalin, 150 mg, Oral, Once  sodium chloride, 10 mL, Intravenous, Q12H      Continuous Infusions:lactated ringers, 9 mL/hr      PRN Meds:.lactated ringers  •  sodium chloride       Past Medical History:   Diagnosis Date   • Acid reflux disease    • Arthritis    • Cancer (HCC)     in remission   • Forgetfulness    • Heart attack (HCC) 2016   • High blood pressure    • History of cancer chemotherapy    • History of irregular heartbeat     A-FIB, only 1 time d/t \"not taking b/p meds\"   • History of radiation therapy     RIGHT NECK   • Hypertension    • Jaw cancer (HCC) 04/2022   • Left knee pain 08/2022   • Lymphoma (HCC)    • Pain, knee     BILAT   • Pedal edema    • PONV (postoperative nausea and vomiting)    • Tonsillar cancer (HCC)     RIGHT, STAGE IV   • Unsteady gait     D/T KNEES BILAT   • Weakness     KNEES BILAT      "   Past Surgical History:   Procedure Laterality Date   • CARDIAC CATHETERIZATION     • COLONOSCOPY  2018    normal per patient   • EYE SURGERY     • HERNIA REPAIR Bilateral    • JOINT REPLACEMENT     • NECK SURGERY Right     TONSILS, MASS, LYMPH NODE 2016 SQUAMOS CELL CARCINOMA   • OTHER SURGICAL HISTORY      for jaw cancer   • TONSILLECTOMY  2016    cancer   • TOTAL KNEE ARTHROPLASTY Right 2021    Procedure: TOTAL KNEE ARTHROPLASTY;  Surgeon: Song Marsh MD;  Location: [facility];  Service: Orthopedics;  Laterality: Right;        Social History     Occupational History   • Not on file   Tobacco Use   • Smoking status: Former Smoker     Packs/day: 1.50     Years: 30.00     Pack years: 45.00     Types: Cigarettes     Quit date:      Years since quittin.6   • Smokeless tobacco: Never Used   Vaping Use   • Vaping Use: Never used   Substance and Sexual Activity   • Alcohol use: Yes     Alcohol/week: 5.0 standard drinks     Types: 5 Cans of beer per week   • Drug use: Never   • Sexual activity: Defer      Social History     Social History Narrative   • Not on file        Family History   Problem Relation Age of Onset   • Heart attack Maternal Grandfather    • Malig Hyperthermia Neg Hx          Review of Systems:   HEENT: Patient denies any headaches, vision changes, change in hearing, or tinnitus, Patient denies any rhinorrhea,epistaxis, sinus pain, mouth or dental problems, sore throat or hoarseness, or dysphagia  Pulmonary: Patient denies any cough, congestion, SOA, or wheezing  Cardiovascular: Patient denies any chest pain, dyspnea, palpitations, weakness, intolerance of exercise, varicosities, swelling of extremities, known murmur  Gastrointestinal:  Patient denies nausea, vomiting, diarrhea, constipation, loss  of appetite, change in appetite, dysphagia, gas, heartburn, melena, change in bowel habits, use of laxatives or other drugs to alter the function of the gastrointestinal  "tract.  Genital/Urinary: Patient denies dysuria, change in color of urine, change in frequency of urination, pain with urgency, incontinence, retention, or nocturia.  Musculoskeletal: Patient denies increased warmth; redness; or swelling of joints; limitation of function; deformity; crepitation: pain in a joint or an extremity, the neck, or the back, especially with movement.  Neurological: Patient denies dizziness, tremor, ataxia, difficulty in speaking, change in speech, paresthesia, loss of sensation, seizures, syncope, changes in memory.  Endocrine system: Patient denies tremors, palpitations, intolerance of heat or cold, polyuria, polydipsia, polyphagia, diaphoresis, exophthalmos, or goiter.  Psychological: Patient denies thoughts/plans or harming self or other; depression,  insomnia, night terrors, [place], memory loss, disorientation.  Skin: Patient denies any bruising, rashes, discoloration, pruritus, wounds, ulcers, decubiti, changes in the hair or nails  Hematopoietic: Patient denies history of spontaneous or excessive bleeding, epistaxis, hematuria, melena, fatigue, enlarged or tender lymph nodes, pallor, history of anemia.    Physical Exam: 62 y.o. male  Vitals:    08/15/22 1423 08/23/22 0937   BP:  146/78   Pulse:  57   Resp:  13   Temp:  97.9 °F (36.6 °C)   TempSrc:  Oral   SpO2:  97%   Weight: 96.2 kg (212 lb) 96 kg (211 lb 9.6 oz)   Height: 172.7 cm (68\") 172.7 cm (68\")       General Appearance:          Alert, cooperative, in no acute distress                                                 Head:    Normocephalic, without obvious abnormality, atraumatic   Eyes:            Lids and lashes normal, conjunctivae and sclerae normal, no   icterus, no pallor, corneas clear, PERRLA   Ears:    Ears appear intact with no abnormalities noted   Throat:   No oral lesions, no thrush, oral mucosa moist   Neck:   No adenopathy, supple, trachea midline, no thyromegaly, no   carotid bruit, no JVD   Back:     No " kyphosis present, no scoliosis present, no skin lesions,      erythema or scars, no tenderness to percussion or                   palpation,   range of motion normal   Lungs:     Clear to auscultation,respirations regular, even and                  unlabored    Heart:    Regular rhythm and normal rate, normal S1 and S2, no            murmur, no gallop, no rub, no click   Chest Wall:    No abnormalities observed   Abdomen:     Normal bowel sounds, no masses, no organomegaly, soft        nontender, nondistended, no guarding, no rebound                tenderness   Rectal:     Deferred   Extremities:   Tenderness over medial aspect of the left knee. Moves all extremities well, no edema,   no cyanosis, no redness   Pulses:   Pulses palpable and equal bilaterally   Skin:   No bleeding, bruising or rash   Lymph nodes:   No palpable adenopathy   Neurologic:   Cranial nerves 2 - 12 grossly intact, sensation intact, DTR       present and equal bilaterally   Left knee. Patient has crepitus throughout range of motion. Positive patellar grind test. Mild effusion. Lachman is negative. Pivot shift is negative. Anterior and posterior drawer signs are negative. Significant joint line tenderness is noted on the medial aspect of the knee. Patient has a varus orientation of the knee. There is fullness and tenderness in the popliteal fossa. Mild distention of a popliteal cyst is noted in this location. Range of motion in flexion is from 0- 110 degrees. Neurovascular status is intact.  Dorsalis pedis and posterior tibial artery pulses are palpable. Common peroneal nerve function is well preserved. Patient's gait is cautious and antalgic. Skin and soft tissues are mildly swollen, consistent with synovitis and effusion. The patient has a significant limp with the first few steps after starting the gait cycle. Getting out of a chair takes a lot of effort due to pain on knee flexion.        Diagnostic Tests:  No visits with results within 2  Day(s) from this visit.   Latest known visit with results is:   Lab on 08/20/2022   Component Date Value Ref Range Status   • WBC 08/20/2022 6.36  3.40 - 10.80 10*3/mm3 Final   • RBC 08/20/2022 4.91  4.14 - 5.80 10*6/mm3 Final   • Hemoglobin 08/20/2022 14.9  13.0 - 17.7 g/dL Final   • Hematocrit 08/20/2022 45.2  37.5 - 51.0 % Final   • MCV 08/20/2022 92.1  79.0 - 97.0 fL Final   • MCH 08/20/2022 30.3  26.6 - 33.0 pg Final   • MCHC 08/20/2022 33.0  31.5 - 35.7 g/dL Final   • RDW 08/20/2022 13.2  12.3 - 15.4 % Final   • RDW-SD 08/20/2022 44.6  37.0 - 54.0 fl Final   • MPV 08/20/2022 10.0  6.0 - 12.0 fL Final   • Platelets 08/20/2022 210  140 - 450 10*3/mm3 Final   • ABO Type 08/20/2022 A   Final   • RH type 08/20/2022 Positive   Final   • Antibody Screen 08/20/2022 Negative   Final   • T&S Expiration Date 08/20/2022 8/25/2022 11:59:00 PM   Final   • MRSA PCR 08/20/2022 No MRSA Detected  No MRSA Detected Final   • COVID19 08/20/2022 Not Detected  Not Detected - Ref. Range Final     No results found.      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     The patient was seen today for preoperative discussion.  The patient has been tried on over-the-counter and prescription NSAID's  despite the risks of anti-inflammatory bleeding, peptic ulcers and erosive gastritis with short term benefit only.  Braces have been prescribed for mechanical support.  Patient has been participating in an exercise program specifically targeting joint pain relief with limited benefit. Intraarticular injections have been used periodically with some but not complete relief of pain.  Ambulation aids have also been utilized.      The details of the surgical procedure were explained including the location of probable incisions and a description of the likely hardware/grafts to be used. The patient understands the likely convalescence after surgery as well as the rehabilitation required.  Also, we have thoroughly discussed with the patient the risks, benefits and alternatives to surgery.  Risks include but are not limited to the risk of infection, joint stiffness, limited range of motion, wound healing problems, scar tissue build up, myocardial infarction, stroke, blood clots (including DVT and/or pulmonary embolus along with the risk of death) neurologic and/or vascular injury, limb length discrepancy, fracture, dislocation, nonunion, malunion, continued pain and need for further surgery including hardware failure requiring revision.   Discharge Plan: tomorrow to home and home health      Date: 8/23/2022    Song Marsh MD      DICTATED UTILIZING DRAGON DICTATION

## 2022-08-23 NOTE — PHARMACY RECOMMENDATION
Transitions-of-Care ([facility]) Pharmacy Future COST Assessment:     /Nurse requesting test claim: N/A - this patient is on the high-cost drug report list    Pharmacy ran test claim for the following:     Drug Sig Covered/PA required Patient Copay per month   Xarelto (rivaroxaban) 10mg QD Unknown - for Xarelto post-op [facility] has free month supply coupon available. No test claim ran needed for this patient.  $ 0.00     Patient Insurance Type: N/A    Deductible/Medicare Gap Issue? Unknown    Is patient signed up for M2B service? Yes    Is above drug(s) eligible for 1 month free through M2B service? Yes - free coupon is available   If eligible,  or [facility] pharmacy can provide coupon upon request.     For billing questions, reach out to [facility] at [phone]  For M2B questions, reach out to Retail Pharmacy at [phone]    Rika ManzanoD  Transitions of Care Clinical Pharmacist   [phone]  8/23/2022 16:14 EDT

## 2022-08-23 NOTE — PLAN OF CARE
Goal Outcome Evaluation:  Plan of Care Reviewed With: patient           Outcome Evaluation: 61 y/o M POD 0 L TKA per Dr. Marsh. PMH R TKA last year. Patient lives with spouse who can help as able. He does not typically use AD and is independent with ADLs and mobility. He lives in 2 story home with 2 JESSENIA but only uses first floor. This date, pt tolerates TKA HEP well. Progressed to standing and ambulation with vitals WNL. Ambulated 75' with RW and CGA. Patient is safe to d/c home POD 0 from PT standpoint (this was goal prior to surgery). Recommending OPPT at d/c.

## 2022-08-23 NOTE — ANESTHESIA PROCEDURE NOTES
Airway  Urgency: elective    Date/Time: 8/23/2022 11:58 AM  Airway not difficult    General Information and Staff    Patient location during procedure: OR  Anesthesiologist: Toño Quinteros MD    Indications and Patient Condition  Indications for airway management: airway protection    Preoxygenated: yes  MILS not maintained throughout  Mask difficulty assessment: 0 - not attempted    Final Airway Details  Final airway type: supraglottic airway      Successful airway: LMA and unique  Size 5    Number of attempts at approach: 1  Assessment: lips, teeth, and gum same as pre-op and atraumatic intubation    Additional Comments  ASA monitors applied; preoxygenated with 100% FiO2 via anesthesia face mask; induction of general anesthesia; patient's position optimized; LMA lubricated with lidocaine jelly and placed; LMA connected to anesthesia circuit; atraumatic/dentition in preoperative condition; LMA secured in place; correct placement confirmed by bilateral chest rise, tube condensation, and return of EtCO2 > 30 mmHg x3

## 2022-08-23 NOTE — ANESTHESIA PROCEDURE NOTES
Peripheral Block    Pre-sedation assessment completed: 8/23/2022 10:30 AM    Patient reassessed immediately prior to procedure    Patient location during procedure: pre-op  Reason for block: procedure for pain, at surgeon's request, post-op pain management and secondary anesthetic  Performed by  Anesthesiologist: Toño Quinteros MD  Preanesthetic Checklist  Completed: patient identified, IV checked, site marked, risks and benefits discussed, surgical consent, monitors and equipment checked, pre-op evaluation and timeout performed  Prep:  Pt Position: sitting  Sterile barriers:cap, gloves, mask and washed/disinfected hands  Prep: ChloraPrep  Patient monitoring: blood pressure monitoring, continuous pulse oximetry and EKG  Procedure    Sedation: yes  Performed under: local infiltration  Guidance:ultrasound guided and landmark technique    ULTRASOUND INTERPRETATION.  Using ultrasound guidance a 20 G gauge needle was placed in close proximity to the femoral nerve, at which point, under ultrasound guidance anesthetic was injected in the area of the nerve and spread of the anesthesia was seen on ultrasound in close proximity thereto.  There were no abnormalities seen on ultrasound; a digital image was taken; and the patient tolerated the procedure with no complications. Images:still images obtained, printed/placed on chart    Laterality:left  Block Type:adductor canal block  Injection Technique:single-shot  Needle Type:echogenic  Needle Gauge:20 G  Resistance on Injection: less than 15 psi    Medications Used: dexamethasone (DECADRON) injection, 4 mg  ropivacaine (NAROPIN) 0.5 % injection, 30 mL  Med administered at 8/23/2022 10:45 AM      Post Assessment  Injection Assessment: negative aspiration for heme, no paresthesia on injection and incremental injection  Patient Tolerance:comfortable throughout block  Complications:no  Additional Notes  Pre-procedure:  Peripheral nerve block performed preoperatively prior to  the start of anesthesia time at the request of the patient and the surgeon for the management of postoperative acute surgical pain as well as for a secondary intraoperative anesthetic (general anesthesia is the primary intraoperative anesthetic); patient identified; pre-procedure vital signs, all relevant labs/studies, complete medical/surgical/anesthetic history, full medication list, full allergy list, and NPO status obtained/reviewed; physical assessment performed; anesthetic options, side effects, potential complications, risks, and benefits discussed; questions answered; patient wishes to proceed with the procedure; written anesthesia procedure consent obtained; patient cleared for procedure; time out performed; IV access in situ    Procedure:  ASA monitor placed; supplemental oxygen provided; patient positioned; hand hygiene performed; sterile technique maintained throughout the procedure; sterile prep applied; insertion site determined by anatomical landmarks, palpation, and ultrasound imaging; live ultrasound guidance throughout the procedure; target nerves/landmarks identified on live ultrasound; skin and subcutaneous tissues numbed by injection of 1% lidocaine; 4 inch 20G Stimuplex Ultra 360 Insulated Echogenic Needle used; realtime needle advancement and placement near the target nerves witnessed on live ultrasound; negative aspiration prior to injection; correct needle placement confirmed on live ultrasound by local anesthetic spread around the target nerves; local anesthetic mixture injected with negative aspiration prior to each injection and after each 1-5 mL injected; needle withdrawn; dressing applied; ultrasound image printed and placed in the patient's permanent medical record    Post-procedure:  Peripheral nerve block placed successfully; good block; no apparent complications; minimal estimated blood loss; vital signs stable throughout; see nurse's notes for vitals; transported to the OR, general  anesthesia induced, and surgery started

## 2022-08-23 NOTE — THERAPY EVALUATION
"Patient Name: Jeyson Rose  : 1960    MRN: [id number]                              Today's Date: 2022       Admit Date: 2022    Visit Dx:     ICD-10-CM ICD-9-CM   1. Primary osteoarthritis of left knee  M17.12 715.16     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)   • Pseudoaneurysm of right femoral artery (HCC)   • Arthritis   • Obesity (BMI 30.0-34.9)   • Primary osteoarthritis of left knee   • Restless leg   • Subclinical hypothyroidism     Past Medical History:   Diagnosis Date   • Acid reflux disease    • Arthritis    • Cancer (HCC)     in remission   • Forgetfulness    • Heart attack (HCC) 2016   • High blood pressure    • History of cancer chemotherapy    • History of irregular heartbeat     A-FIB, only 1 time d/t \"not taking b/p meds\"   • History of radiation therapy     RIGHT NECK   • Hypertension    • Jaw cancer (HCC) 2022   • Left knee pain 2022   • Lymphoma (HCC)    • Pain, knee     BILAT   • Pedal edema    • PONV (postoperative nausea and vomiting)    • Tonsillar cancer (HCC)     RIGHT, STAGE IV   • Unsteady gait     D/T KNEES BILAT   • Weakness     KNEES BILAT     Past Surgical History:   Procedure Laterality Date   • CARDIAC CATHETERIZATION     • COLONOSCOPY  2018    normal per patient   • EYE SURGERY     • HERNIA REPAIR Bilateral    • JOINT REPLACEMENT     • NECK SURGERY Right     TONSILS, MASS, LYMPH NODE 2016 SQUAMOS CELL CARCINOMA   • OTHER SURGICAL HISTORY      for jaw cancer   • TONSILLECTOMY  2016    cancer   • TOTAL KNEE ARTHROPLASTY Right 2021    Procedure: TOTAL KNEE ARTHROPLASTY;  Surgeon: Maximo" MD Song;  Location: [facility] OR;  Service: Orthopedics;  Laterality: Right;      General Information     Row Name 08/23/22 1621          Physical Therapy Time and Intention    Document Type evaluation  -SS     Mode of Treatment physical therapy  -SS     Row Name 08/23/22 1622 08/23/22 1621       General Information    Patient Profile Reviewed -- yes  -SS    Prior Level of Function independent:  -SS --    Row Name 08/23/22 1622          Living Environment    People in Home spouse;parent(s)  -SS     Row Name 08/23/22 1622          Home Main Entrance    Number of Stairs, Main Entrance two  -SS     Row Name 08/23/22 1622          Cognition    Orientation Status (Cognition) oriented x 4  -SS           User Key  (r) = Recorded By, (t) = Taken By, (c) = Cosigned By    Initials Name Provider Type    SS Jenni Sigala PT Physical Therapist               Mobility     Row Name 08/23/22 1622          Bed Mobility    Bed Mobility bed mobility (all) activities  -     All Activities, [place] (Bed Mobility) minimum assist (75% patient effort)  -SS     Row Name 08/23/22 1622          Sit-Stand Transfer    Sit-Stand [place] (Transfers) contact guard  -     Row Name 08/23/22 1622          Gait/Stairs (Locomotion)    [place] Level (Gait) contact guard  -     Assistive Device (Gait) walker, front-wheeled  -SS     Ambulated day of surgery or within 4 hours of PACU discharge yes  -SS     Distance in Feet (Gait) 75'  -SS     Deviations/Abnormal Patterns (Gait) gait speed decreased  -SS           User Key  (r) = Recorded By, (t) = Taken By, (c) = Cosigned By    Initials Name Provider Type    SS Jenni Sigala PT Physical Therapist               Obj/Interventions     Row Name 08/23/22 1622          Range of Motion Comprehensive    Comment, General Range of Motion L knee ROM 10-85deg otherwise WNL  -SS     Row Name 08/23/22 1622          Strength Comprehensive (MMT)    Comment, General Manual Muscle  Testing (MMT) Assessment R LE WNL; L <3/5 hip, <3/5 knee, ankle  -     Row Name 08/23/22 1622          Motor Skills    Therapeutic Exercise --  L TKA exercises, given handout  -     Row Name 08/23/22 1622          Sensory Assessment (Somatosensory)    Sensory Assessment (Somatosensory) sensation intact  -           User Key  (r) = Recorded By, (t) = Taken By, (c) = Cosigned By    Initials Name Provider Type    SS Jenni Sigala, PT Physical Therapist               Goals/Plan     Row Name 08/23/22 1629          Gait Training Goal 1 (PT)    Activity/Assistive Device (Gait Training Goal 1, PT) gait (walking locomotion)  -SS     [place] Level (Gait Training Goal 1, PT) modified independence  -SS     Distance (Gait Training Goal 1, PT) 100  -SS     Time Frame (Gait Training Goal 1, PT) long term goal (LTG);2 weeks  -     Row Name 08/23/22 1629          Stairs Goal 1 (PT)    Activity/Assistive Device (Stairs Goal 1, PT) stairs, all skills  -SS     [place] Level/Cues Needed (Stairs Goal 1, PT) modified independence  -SS     Number of Stairs (Stairs Goal 1, PT) 2  -SS     Time Frame (Stairs Goal 1, PT) long term goal (LTG);2 weeks  -     Row Name 08/23/22 1629          Patient Education Goal (PT)    Activity (Patient Education Goal, PT) TKA HEP  -SS     [place]/Cues/Accuracy (Memory Goal 2, PT) demonstrates adequately;independent;verbalizes understanding  -SS     Time Frame (Patient Education Goal, PT) long term goal (LTG);2 weeks  -     Row Name 08/23/22 1629          Therapy Assessment/Plan (PT)    Planned Therapy Interventions (PT) transfer training;strengthening;home exercise program;patient/family education  -           User Key  (r) = Recorded By, (t) = Taken By, (c) = Cosigned By    Initials Name Provider Type    Jenni Escobar PT Physical Therapist               Clinical Impression     Row Name 08/23/22 1625          Pain    Pretreatment Pain Rating 4/10  -      Posttreatment Pain Rating 4/10  -     Row Name 08/23/22 1625          Plan of Care Review    Plan of Care Reviewed With patient  -     Outcome Evaluation 63 y/o M POD 0 L TKA per Dr. Marsh. PMH R TKA last year. Patient lives with spouse who can help as able. He does not typically use AD and is independent with ADLs and mobility. He lives in 2 story home with 2 JESSENIA but only uses first floor. This date, pt tolerates TKA HEP well. Progressed to standing and ambulation with vitals WNL. Ambulated 75' with RW and CGA. Patient is safe to d/c home POD 0 from PT standpoint (this was goal prior to surgery). Recommending OPPT at d/c.  -     Row Name 08/23/22 4435          Therapy Assessment/Plan (PT)    Rehab Potential (PT) good, to achieve stated therapy goals  -     Criteria for Skilled Interventions Met (PT) yes;meets criteria  -     Therapy Frequency (PT) 2 times/day  -           User Key  (r) = Recorded By, (t) = Taken By, (c) = Cosigned By    Initials Name Provider Type     Jenni Sigala PT Physical Therapist               Outcome Measures    No documentation.                              Physical Therapy Education                 Title: PT OT SLP Therapies (Done)     Topic: Physical Therapy (Done)     Point: Mobility training (Done)     Learning Progress Summary           Patient Acceptance, E, VU by  at 8/23/2022 1630                   Point: Home exercise program (Done)     Learning Progress Summary           Patient Acceptance, E, VU by  at 8/23/2022 1630                   Point: Body mechanics (Done)     Learning Progress Summary           Patient Acceptance, E, VU by  at 8/23/2022 1630                   Point: Precautions (Done)     Learning Progress Summary           Patient Acceptance, E, VU by  at 8/23/2022 1630                               User Key     Initials Effective Dates Name Provider Type Discipline     06/16/21 -  Jenni Sigala PT Physical Therapist PT               PT Recommendation and Plan  Planned Therapy Interventions (PT): transfer training, strengthening, home exercise program, patient/family education  Plan of Care Reviewed With: patient  Outcome Evaluation: 63 y/o M POD 0 L TKA per Dr. Marsh. PMH R TKA last year. Patient lives with spouse who can help as able. He does not typically use AD and is independent with ADLs and mobility. He lives in 2 story home with 2 JESSENIA but only uses first floor. This date, pt tolerates TKA HEP well. Progressed to standing and ambulation with vitals WNL. Ambulated 75' with RW and CGA. Patient is safe to d/c home POD 0 from PT standpoint (this was goal prior to surgery). Recommending OPPT at d/c.     Time Calculation:    PT Charges     Row Name 08/23/22 1630             Time Calculation    Start Time 1528  -SS      Stop Time 1600  -SS      Time Calculation (min) 32 min  -      PT Received On 08/23/22  -      PT - Next Appointment 08/24/22  -      PT Goal Re-Cert Due Date 09/06/22  -              Time Calculation- PT    Total Timed Code Minutes- PT 0 minute(s)  -            User Key  (r) = Recorded By, (t) = Taken By, (c) = Cosigned By    Initials Name Provider Type    SS Jenni Sigala PT Physical Therapist              Therapy Charges for Today     Code Description Service Date Service Provider Modifiers Qty    76655546318 HC PT EVAL MOD COMPLEXITY 4 8/23/2022 Jenni Sigala PT GP 1               Jenni Sigala PT  8/23/2022

## 2022-08-23 NOTE — OP NOTE
TOTAL KNEE ARTHROPLASTY OPERATIVE     PATIENT NAME:Jeyson Rose     YOB: 1960     ATTENDING PHYSICIAN: Song Marsh MD     DATE OF PROCEDURE: 8/23/2022     PREOPERATIVE DIAGNOSIS: Severe degenerative osteoarthritis, left knee.    POSTOPERATIVE DIAGNOSIS: Post-Op Diagnosis Codes:     * Primary osteoarthritis of left knee [M17.12]    PRINCIPAL DIAGNOSIS: Severe degenerative osteoarthritis left knee with a varus deformity.    PROCEDURE: Left total knee arthroplasty.    SURGEON:  Song Marsh M.D.    ASSISTANT: first Elvi assistant was responsible for performing the following activities: Retraction, Suction, Irrigation, Suturing, Closing and Placing Dressing and their skilled assistance was necessary for the success of this case.       ANESTHESIOLOGIST: Dr. Toño Quinteros MD    ANESTHESIA: Adductor canal block for postop pain control followed by general anesthesia.    POSITION: Supine on the operating table.    DRAINS: None.    COMPLICATIONS: None.    ESTIMATED BLOOD LOSS: 200ml  Please note: We used the Mirametrix robotic arm system in surgery intraoperatively to determine the soft tissue balancing as well as the accuracy of the bone cuts.  IMPLANT USED: Jaclyn Persona total knee replacement system, #10 standard posterior cruciate retaining femoral component, number F tibia with a 30 mm intramedullary stem, 10 mm thick MC, medially constrained Vitamin E impregnated polyethylene insert, #38 patella anchored into position using Refobicin antibiotic impregnated bone cement.     SPECIMENS: * No orders in the log *        INDICATION: The patient has been having very significant pain and discomfort in the knee.  We had scheduled the patient for total knee replacement after all forms of conservative nonoperative care had failed to provide adequate relief of symptoms.  Patient understood all the risks and benefits which were discussed in great detail including the possibility of death, infection,  myocardial infarction, DVT, pulmonary embolism, stiffness of the knee, wound infection and breakdown, possibility of revision surgery, neurovascular compromise, pneumonia, pulmonary embolism      DETAILS OF PROCEDURE: Surgical timeout was called. Operative extremity was correctly identified in the operating room suite. Patient was placed under appropriate anesthetic.  Patient was administered IV antibiotics per SCIP protocol and hospital policy. The patient’s operative extremity was correctly identified in the operating room suite.A Tourniquet was applied after the leg has been exsanguinated. The correctly identified extremity was prepped and draped in a standard fashion.      An anterior approach was performed and a quad sparing, subvastus approach was carried out. The patellofemoral ligament was resected. Medial soft tissue release was carried out on the medial face of the tibia to balance the soft tissues and to release the contracture of the knee MCL. Tibial Osteophytes were resected. Severe bone-on-bone appearance was noted.  A thorough synovectomy was carried out. The Synovium was thickened and hypertrophic. The PCL and MCL were carefully protected throughout the entire procedure. The Raquel robotic arm system was brought into the operating room and the distal femur was mapped. A 10 mm thick cut was made off the distal femur. A measuring guide was used and #10 standard posterior cruciate retaining femoral component jig was found to be the best fit. Anterior, posterior and chamfer cuts were created. Care was taken to prevent creating a distal femoral notch. The proximal tibia was then mapped with navigation.  A number F tibia was found to be the best fit resting nicely on the cortical margins of the proximal resected metaphysis.  4 mm of the bone was resected off the medial tibial plateau.  An appropriate tibial slope was built into the cut to match the normal anatomy.  A trial reduction was carried out. Rotation  and orientation of the components were carefully marked. Flexion and extension gaps were checked and found to be symmetric. The stability of the components was checked in full extension, mid- flexion and full flexion.The patella was everted, it was measured and cut. Patellar Tracking was excellent.  A #38 patella was found to be the best fit resting nicely on the host bone.  A Lateral release was not deemed necessary. Femoral lug holes were drilled. The Proximal Tibia was die punched. Patellar anchor holes were drilled.  The posterior capsular structures and the subperiosteal ligamentous insertions were infiltrated with Exparel as a local anesthetic.    The cancellous bone was lavaged with a Pulse lavage  and dried.  We also used diluted Betadine as an antiseptic rinse solution.  Cement was mixed on the back table. Components were cemented into position sequentially, starting with the number F left tibial component and going to the #10 standard posterior cruciate retaining femur and then the #38 patella. The excess amounts of bone cement were removed from the bone-metal interface. Trial reduction was carried out once the cement was fully cured. A 10 mm tibial polyethylene insert, MC medially constrained design insert, was then locked into position on the tibial tray. Wound was lavaged with antibiotic containing irrigating solution. Tourniquet was deflated, hemostasis achieved. An analgesic cocktail was injected intra-articularly into the joint capsule and ligamentous insertions on bone for post op pain relief. The arthrotomy was repaired in 60 degrees of flexion. Subcutaneous sutures were applied. Occlusive dressing was applied. No complications were encountered. Sponge count and needle count were correct. The patient was reversed from anesthesia and taken from the operating room to the recovery room in stable condition. I discussed the satisfactory performance of this procedure with the patient’s family  and answered all questions for them.       Song Marsh MD  8/23/2022  14:50 EDT

## 2022-08-24 ENCOUNTER — READMISSION MANAGEMENT (OUTPATIENT)
Dept: CALL CENTER | Facility: HOSPITAL | Age: 62
End: 2022-08-24

## 2022-08-24 ENCOUNTER — TELEPHONE (OUTPATIENT)
Dept: ORTHOPEDIC SURGERY | Facility: CLINIC | Age: 62
End: 2022-08-24

## 2022-08-24 VITALS
BODY MASS INDEX: 32.07 KG/M2 | DIASTOLIC BLOOD PRESSURE: 85 MMHG | RESPIRATION RATE: 16 BRPM | HEIGHT: 68 IN | TEMPERATURE: 97.5 F | SYSTOLIC BLOOD PRESSURE: 132 MMHG | HEART RATE: 77 BPM | OXYGEN SATURATION: 92 % | WEIGHT: 211.6 LBS

## 2022-08-24 PROBLEM — E66.9 OBESITY (BMI 30.0-34.9): Chronic | Status: ACTIVE | Noted: 2021-07-28

## 2022-08-24 PROBLEM — I25.10 CAD (CORONARY ARTERY DISEASE): Chronic | Status: ACTIVE | Noted: 2021-07-28

## 2022-08-24 PROBLEM — G60.9 IDIOPATHIC PERIPHERAL NEUROPATHY: Chronic | Status: ACTIVE | Noted: 2017-10-26

## 2022-08-24 PROBLEM — E03.8 SUBCLINICAL HYPOTHYROIDISM: Chronic | Status: ACTIVE | Noted: 2022-05-03

## 2022-08-24 PROBLEM — K21.9 ESOPHAGEAL REFLUX: Chronic | Status: ACTIVE | Noted: 2021-07-28

## 2022-08-24 PROBLEM — E66.811 OBESITY (BMI 30.0-34.9): Chronic | Status: ACTIVE | Noted: 2021-07-28

## 2022-08-24 PROBLEM — M19.90 ARTHRITIS: Chronic | Status: ACTIVE | Noted: 2021-07-28

## 2022-08-24 PROBLEM — N17.9 AKI (ACUTE KIDNEY INJURY) (HCC): Status: ACTIVE | Noted: 2022-08-24

## 2022-08-24 LAB
ANION GAP SERPL CALCULATED.3IONS-SCNC: 11 MMOL/L (ref 5–15)
BUN SERPL-MCNC: 31 MG/DL (ref 8–23)
BUN/CREAT SERPL: 19.3 (ref 7–25)
CALCIUM SPEC-SCNC: 9.6 MG/DL (ref 8.6–10.5)
CHLORIDE SERPL-SCNC: 101 MMOL/L (ref 98–107)
CO2 SERPL-SCNC: 27 MMOL/L (ref 22–29)
CREAT SERPL-MCNC: 0.98 MG/DL (ref 0.76–1.27)
CREAT SERPL-MCNC: 1.61 MG/DL (ref 0.76–1.27)
EGFRCR SERPLBLD CKD-EPI 2021: 47.5 ML/MIN/1.73
EGFRCR SERPLBLD CKD-EPI 2021: 86.1 ML/MIN/1.73
GLUCOSE SERPL-MCNC: 124 MG/DL (ref 65–99)
HCT VFR BLD AUTO: 43.1 % (ref 37.5–51)
HGB BLD-MCNC: 14.5 G/DL (ref 13–17.7)
POTASSIUM SERPL-SCNC: 5 MMOL/L (ref 3.5–5.2)
SODIUM SERPL-SCNC: 139 MMOL/L (ref 136–145)

## 2022-08-24 PROCEDURE — A9270 NON-COVERED ITEM OR SERVICE: HCPCS | Performed by: ORTHOPAEDIC SURGERY

## 2022-08-24 PROCEDURE — 99213 OFFICE O/P EST LOW 20 MIN: CPT | Performed by: NURSE PRACTITIONER

## 2022-08-24 PROCEDURE — 80048 BASIC METABOLIC PNL TOTAL CA: CPT | Performed by: ORTHOPAEDIC SURGERY

## 2022-08-24 PROCEDURE — 85018 HEMOGLOBIN: CPT | Performed by: ORTHOPAEDIC SURGERY

## 2022-08-24 PROCEDURE — G0378 HOSPITAL OBSERVATION PER HR: HCPCS

## 2022-08-24 PROCEDURE — 63710000001 LEVOTHYROXINE 50 MCG TABLET: Performed by: ORTHOPAEDIC SURGERY

## 2022-08-24 PROCEDURE — 82565 ASSAY OF CREATININE: CPT | Performed by: NURSE PRACTITIONER

## 2022-08-24 PROCEDURE — 99024 POSTOP FOLLOW-UP VISIT: CPT | Performed by: ORTHOPAEDIC SURGERY

## 2022-08-24 PROCEDURE — 97110 THERAPEUTIC EXERCISES: CPT

## 2022-08-24 PROCEDURE — 85014 HEMATOCRIT: CPT | Performed by: ORTHOPAEDIC SURGERY

## 2022-08-24 PROCEDURE — 63710000001 AMLODIPINE 5 MG TABLET: Performed by: ORTHOPAEDIC SURGERY

## 2022-08-24 PROCEDURE — 63710000001 CARVEDILOL 6.25 MG TABLET: Performed by: ORTHOPAEDIC SURGERY

## 2022-08-24 PROCEDURE — 25010000002 CEFAZOLIN PER 500 MG: Performed by: ORTHOPAEDIC SURGERY

## 2022-08-24 PROCEDURE — 97116 GAIT TRAINING THERAPY: CPT

## 2022-08-24 RX ADMIN — CARVEDILOL 12.5 MG: 6.25 TABLET, FILM COATED ORAL at 08:37

## 2022-08-24 RX ADMIN — LEVOTHYROXINE SODIUM 50 MCG: 0.05 TABLET ORAL at 05:27

## 2022-08-24 RX ADMIN — AMLODIPINE BESYLATE 10 MG: 5 TABLET ORAL at 08:37

## 2022-08-24 RX ADMIN — CEFAZOLIN 2 G: 2 INJECTION, POWDER, FOR SOLUTION INTRAMUSCULAR; INTRAVENOUS at 04:33

## 2022-08-24 NOTE — CASE MANAGEMENT/SOCIAL WORK
Case Management Discharge Note      Final Note: home with family  [facility] PT    Provided Post Acute Provider List?: Yes  Post Acute Provider List: Outpatient Therapy    Selected Continued Care - Discharged on 8/24/2022 Admission date: 8/23/2022 - Discharge disposition: Home-Health Care [facility] [place]                     Transportation Services  Private: Car    Final Discharge Disposition Code: 01 - home or self-care

## 2022-08-24 NOTE — THERAPY TREATMENT NOTE
"Subjective: Pt agreeable to therapeutic plan of care.    WB'ing status: L Lower Extremity Weight Bearing As Tolerated    Therapeutic Exercise: 10 Reps L Lower Extremity AROM Total Knee: Ankle Pumps, Quad Sets, Heel slides, Hip Abduction, LAQ    Precautions: None    Objective:     Bed mobility - N/A or Not attempted.  Transfers - SBA and with rolling walker  Ambulation - 100 feet SBA and with rolling walker    Vitals: WNL    Pain: 0 VAS  Education: Provided education on importance of mobility and skilled verbal / tactile cueing throughout intervention.     Assessment: Jeyson Rose presents with functional mobility impairments which indicate the need for skilled intervention. Tolerating session today without incident. Pt with good understanding of therapeutic exercises with MIN VC for technique. Pt ambulated with Rwx safely in room. VC required for pacing, with some impulsive tendencies. Will continue to follow and progress as tolerated.     Plan/Recommendations:   Low Intensity Therapy recommended post-acute care - This is recommended as therapy feels this patient would require 2-3 visits per week. OP or HH would be the best option depending on patient's home bound status. Consider, if the patient has other  \"skilled\" needs such as wounds, IV antibiotics, etc. Combined with \"low intensity\" could also equate to a SNF. If patient is medically complex, consider LTAC.. Pt requires no DME at discharge.     Pt desires Home with family assist and Outpatient therapy at discharge. Pt cooperative; agreeable to therapeutic recommendations and plan of care.         Basic Mobility 6-click:  Rollin = Total, A lot = 2, A little = 3; 4 = None  Supine>Sit:   1 = Total, A lot = 2, A little = 3; 4 = None   Sit>Stand with arms:  1 = Total, A lot = 2, A little = 3; 4 = None  Bed>Chair:   1 = Total, A lot = 2, A little = 3; 4 = None  Ambulate in room:  1 = Total, A lot = 2, A little = 3; 4 = None  3-5 Steps with railing: "  1 = Total, A lot = 2, A little = 3; 4 = None  Score: 22    Modified [place]: 0 = No Symptoms    Post-Tx Position: Up in Chair, Alarms activated, and Call light and personal items within reach  PPE: gloves, surgical mask, eyewear protection

## 2022-08-24 NOTE — CASE MANAGEMENT/SOCIAL WORK
Discharge Planning Assessment  [facility]     Patient Name: Jeyson Rose  MRN: [id number]  Today's Date: 8/24/2022    Admit Date: 8/23/2022     Discharge Needs Assessment     Row Name 08/24/22 1415       Living Environment    People in Home spouse    Name(s) of People in Home Savanah    Current Living Arrangements home    Provides Primary Care For no one    Family Caregiver if Needed spouse    Family Caregiver Names Savanah    Quality of Family Relationships supportive    Able to Return to Prior Arrangements yes       Resource/Environmental Concerns    Transportation Concerns none       Transition Planning    Patient/Family Anticipates Transition to home with family    Patient/Family Anticipated Services at Transition none    Transportation Anticipated car, drives self       Discharge Needs Assessment    Readmission Within the Last 30 Days no previous admission in last 30 days    Equipment Currently Used at Home none;other (see comments)  Has a rolling walker from his last knee replacement    Concerns to be Addressed discharge planning;care coordination/care conferences    Anticipated Changes Related to Illness none    Equipment Needed After Discharge walker, rolling    Outpatient/Agency/Support Group Needs outpatient therapy    Discharge Facility/Level of Care Needs outpatient therapy    Provided Post Acute Provider List? Yes    Post Acute Provider List Outpatient Therapy    Patient's Choice of Community Agency(s) [facility] [place]               Discharge Plan     Row Name 08/24/22 1418       Plan    Plan Home with family and OP PT    Patient/Family in Agreement with Plan yes    Plan Comments Spoke with patient at bedside. Confirmed pcp and pharmacy. Lives at home with wife Savanah, who will transport patient home at discharge. Patient already has a rolling walker at home.  Patient set up with [facility] in [place] before his surgery. Appointment is August 25/2022  at 2pm. Denies any other dc needs.               Continued Care and Services - Discharged on 8/24/2022 Admission date: 8/23/2022 - Discharge disposition: Home-Health Care c   Coordination has not been started for this encounter.       Expected Discharge Date and Time     Expected Discharge Date Expected Discharge Time    Aug 24, 2022          Demographic Summary     Row Name 08/24/22 1414       General Information    Admission Type observation    Arrived From home    Referral Source admission list    Reason for Consult discharge planning    Preferred Language English       Contact Information    Permission Granted to Share Info With                Functional Status     Row Name 08/24/22 1415       Functional Status    Usual Activity Tolerance good    Current Activity Tolerance moderate       Functional Status, IADL    Medications independent    Meal Preparation independent    Housekeeping independent    Shopping independent       Mental Status    General Appearance WDL WDL       Mental Status Summary    Recent Changes in Mental Status/Cognitive Functioning no changes                         Sandrita Odell RN   Met with patient in room wearing PPE: mask, face shield/goggles, gloves, gown.      Maintained distance greater than six feet and spent less than 15 minutes in the room.

## 2022-08-24 NOTE — PLAN OF CARE
Goal Outcome Evaluation:              Outcome Evaluation: pt. to be d/c this shift pending creatinine levels.

## 2022-08-24 NOTE — TELEPHONE ENCOUNTER
CALLED PATIENT TO LET HIM KNOW THAT DR. MONK DOES WANT HIM TO BEGIN PHYSICAL THERAPY AS SOON AS POSSIBLE TO HELP REGAIN MAXIMUM RANGE OF MOTION.  HE EXPRESSED UNDERSTANDING

## 2022-08-24 NOTE — DISCHARGE SUMMARY
Orthopedic Discharge Summary      Patient: Jeyson Rose      YOB: 1960    Medical Record Number: [id number]    Attending Physician: Song Marsh MD  Consulting Physician(s): Hospitalist service.  Date of Admission: 8/23/2022  8:54 AM  Date of Discharge: 24 August 2022.  This patient underwent a total knee arthroplasty on the left knee yesterday.  He has done extremely well postoperatively.  Range of motion of the knee has improved progressively with physical therapy.  His pain is fairly well controlled.  He is being released today in a stable satisfactory fashion.  He is on Xarelto for DVT prophylaxis.  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)   • Pseudoaneurysm of right femoral artery (HCC)   • Arthritis   • Obesity (BMI 30.0-34.9)   • Primary osteoarthritis of left knee   • Restless leg   • Subclinical hypothyroidism     GA TOTAL KNEE ARTHROPLASTY [75445] (Left total knee arthroplasty with robotics)       Allergies   Allergen Reactions   • Bupropion Other (See Comments)     Rash        Current Medications:     Discharge Medications      New Medications      Instructions Start Date   ondansetron 4 MG tablet  Commonly known as: Zofran   4 mg, Oral, Every 6 Hours PRN      rivaroxaban 10 MG tablet  Commonly known as: Xarelto   10 mg, Oral, Daily         Changes to Medications      Instructions Start Date   amLODIPine 10 MG tablet  Commonly known as: NORVASC  What changed: when to take this   10 mg, Oral, Daily      furosemide 20 MG tablet  Commonly known as: LASIX  What changed: when to take this   TAKE 1 TABLET BY MOUTH DAILY     "  levothyroxine 50 MCG tablet  Commonly known as: SYNTHROID, LEVOTHROID  What changed: when to take this   50 mcg, Oral, Daily      losartan 100 MG tablet  Commonly known as: COZAAR  What changed: when to take this   100 mg, Oral, Daily         Continue These Medications      Instructions Start Date   amitriptyline 25 MG tablet  Commonly known as: ELAVIL   50 mg, Oral, Nightly      aspirin 81 MG EC tablet   81 mg, Oral, Daily      carvedilol 12.5 MG tablet  Commonly known as: COREG   TAKE 1 TABLET BY MOUTH TWICE DAILY WITH MEALS      cholecalciferol 10 MCG (400 UNIT) tablet  Commonly known as: VITAMIN D3   1 tablet, Oral, Daily      esomeprazole 40 MG capsule  Commonly known as: nexIUM   40 mg, Oral, Every Morning Before Breakfast      gabapentin 300 MG capsule  Commonly known as: NEURONTIN   1 capsule, Oral, 2 Times Daily      hydrOXYzine 25 MG tablet  Commonly known as: ATARAX   25 mg, Oral, 3 Times Daily PRN      oxyCODONE-acetaminophen 5-325 MG per tablet  Commonly known as: PERCOCET   1 tablet, Oral, Every 6 Hours PRN      potassium chloride ER 20 MEQ tablet controlled-release ER tablet  Commonly known as: K-TAB   20 mEq, Oral, Daily      pramipexole 0.25 MG tablet  Commonly known as: MIRAPEX   0.25 mg, Oral, Nightly PRN      triamcinolone 0.1 % cream  Commonly known as: KENALOG   1 application, Topical, 2 Times Daily      vitamin B-12 100 MCG tablet  Commonly known as: CYANOCOBALAMIN   1 tablet, Oral, Daily                  Past Medical History:   Diagnosis Date   • Acid reflux disease    • Arthritis    • Cancer (HCC)     in remission   • Forgetfulness    • Heart attack (HCC) 2016   • High blood pressure    • History of cancer chemotherapy    • History of irregular heartbeat     A-FIB, only 1 time d/t \"not taking b/p meds\"   • History of radiation therapy     RIGHT NECK   • Hypertension    • Jaw cancer (HCC) 04/2022   • Left knee pain 08/2022   • Lymphoma (HCC)    • Pain, knee     BILAT   • Pedal edema    • " PONV (postoperative nausea and vomiting)    • Tonsillar cancer (HCC)     RIGHT, STAGE IV   • Unsteady gait     D/T KNEES BILAT   • Weakness     KNEES BILAT        Past Surgical History:   Procedure Laterality Date   • CARDIAC CATHETERIZATION     • COLONOSCOPY  2018    normal per patient   • EYE SURGERY     • HERNIA REPAIR Bilateral    • JOINT REPLACEMENT     • NECK SURGERY Right     TONSILS, MASS, LYMPH NODE 2016 SQUAMOS CELL CARCINOMA   • OTHER SURGICAL HISTORY      for jaw cancer   • TONSILLECTOMY  2016    cancer   • TOTAL KNEE ARTHROPLASTY Right 2021    Procedure: TOTAL KNEE ARTHROPLASTY;  Surgeon: Song Marsh MD;  Location: [facility] OR;  Service: Orthopedics;  Laterality: Right;        Social History     Occupational History   • Not on file   Tobacco Use   • Smoking status: Former Smoker     Packs/day: 1.50     Years: 30.00     Pack years: 45.00     Types: Cigarettes     Quit date:      Years since quittin.6   • Smokeless tobacco: Never Used   Vaping Use   • Vaping Use: Never used   Substance and Sexual Activity   • Alcohol use: Yes     Alcohol/week: 5.0 standard drinks     Types: 5 Cans of beer per week   • Drug use: Never   • Sexual activity: Defer      Social History     Social History Narrative   • Not on file        Family History   Problem Relation Age of Onset   • Heart attack Maternal Grandfather    • Malig Hyperthermia Neg Hx              Hospital Course:  62 y.o. male admitted to [facility] to services of Song Marsh MD with Primary osteoarthritis of left knee [M17.12] on 2022 and underwent DE TOTAL KNEE ARTHROPLASTY [71934] (Left total knee arthroplasty with robotics) Per Song Marsh MD. Antibiotic and VTE prophylaxis were per SCIP protocols. Post-operatively the patient transferred to the post-operative floor where the patient underwent mobilization therapy that included active as well as passive ROM exercises. Opioids were titrated to achieve appropriate pain  management to allow for participation in mobilization exercises. Vital signs are now stable. The incision is intact without signs or symptoms of infection. Operative extremity neurovascular status remains intact.   Appropriate education re: incision care, activity levels, medications, and follow up visits was completed and all questions were answered. The patient is now deemed stable for discharge from hospital.      DIAGNOSTIC TESTS:   Admission on 08/23/2022   Component Date Value Ref Range Status   • Glucose 08/24/2022 124 (A) 65 - 99 mg/dL Final   • BUN 08/24/2022 31 (A) 8 - 23 mg/dL Final   • Creatinine 08/24/2022 1.61 (A) 0.76 - 1.27 mg/dL Final   • Sodium 08/24/2022 139  136 - 145 mmol/L Final   • Potassium 08/24/2022 5.0  3.5 - 5.2 mmol/L Final   • Chloride 08/24/2022 101  98 - 107 mmol/L Final   • CO2 08/24/2022 27.0  22.0 - 29.0 mmol/L Final   • Calcium 08/24/2022 9.6  8.6 - 10.5 mg/dL Final   • BUN/Creatinine Ratio 08/24/2022 19.3  7.0 - 25.0 Final   • Anion Gap 08/24/2022 11.0  5.0 - 15.0 mmol/L Final   • eGFR 08/24/2022 47.5 (A) >60.0 mL/min/1.73 Final    National Kidney Foundation and American Society of Nephrology (ASN) Task Force recommended calculation based on the Chronic Kidney Disease Epidemiology Collaboration (CKD-EPI) equation refit without adjustment for race.   • Hemoglobin 08/24/2022 14.5  13.0 - 17.7 g/dL Final   • Hematocrit 08/24/2022 43.1  37.5 - 51.0 % Final     No results found.    Discharge and Follow up Instructions:    Discharge Instructions:       1. Patient is to continue with physical therapy exercises BID and continue working with        the physical therapist as ordered.  2.  Continue to follow precautions as instructed.   3.  Patient may weight bear as tolerated.   4.  Continue MIRIAM hose daily and ice regularly. Patient instructed on frequent calf                  pumping exercises.  May remove compression stockings at night.  5.  Patient also instructed on incentive  spirometer during hospitalization and                          encouraged to continue to use at home regularly.   6.  Patient is instructed to continue DVT prophylaxis.  7.  The dressing should be left in place. If waterproof dressing is intact the patient may         shower immediately following discharge. If the dressing becomes disloged or                   saturated it should be changed and patient must wait until POD #5 to shower. If               dressing is changed, apply dry sterile dressing after showering.  8.  Follow up appointment in 2 weeks - patient to call the office at [phone] to schedule.       Date: 8/24/2022    Song Marsh MD      Time: Discharge 20 min     DICTATED UTILIZING DRAGON DICTATION

## 2022-08-24 NOTE — PLAN OF CARE
Goal Outcome Evaluation:  Plan of Care Reviewed With: patient        Progress: no change  Outcome Evaluation: Pt was admitted s/p total left knee replacement. He has had no c/o pain throughout the night and has ambulated in the halls without trouble. VSS No concerns at this time. Will continue to monitor

## 2022-08-24 NOTE — CONSULTS
"    [facility] Medicine Services - Consult Note    Patient Name: Jeyson Rose  : 1960  MRN: [id number]  Primary Care Physician:  Rajendra Sam APRN  Referring Physician: Song Marsh MD  Date of admission: 2022    Consults    Subjective      Reason for Consult/ Chief Complaint: Left knee pain    History of Present Illness: Jeyson Rose is a 62 y.o. male with a past medical history of arthritis, CAD, GERD, hypertension, peripheral neuropathy, primary squamous cell carcinoma of the head and neck, and hypothyroidism who presented to [facility] on 2022 to undergo surgery.  Patient underwent a left total knee arthroplasty by Dr. Marsh.  Hospitalist were consulted for medical management.  Patient denies any pain postoperatively.  Plan of care discussed with patient.  Patient was found to have elevated creatinine and reports that he does see a nephrologist however he sees a nephrologist for his blood pressure.    Review of Systems   Constitutional: Negative.   HENT: Negative.    Eyes: Negative.    Cardiovascular: Negative.    Respiratory: Negative.    Skin: Negative.    Musculoskeletal: Negative.    Gastrointestinal: Negative.    Genitourinary: Negative.    Neurological: Negative.    Psychiatric/Behavioral: Negative.         Personal History     Past Medical History:   Diagnosis Date   • Acid reflux disease    • Arthritis    • Cancer (HCC)     in remission   • Forgetfulness    • Heart attack (HCC)    • High blood pressure    • History of cancer chemotherapy    • History of irregular heartbeat     A-FIB, only 1 time d/t \"not taking b/p meds\"   • History of radiation therapy     RIGHT NECK   • Hypertension    • Jaw cancer (HCC) 2022   • Left knee pain 2022   • Lymphoma (HCC)    • Pain, knee     BILAT   • Pedal edema    • PONV (postoperative nausea and vomiting)    • Tonsillar cancer (HCC)     RIGHT, STAGE IV   • Unsteady gait     D/T KNEES BILAT   • " Weakness     KNEES BILAT       Past Surgical History:   Procedure Laterality Date   • CARDIAC CATHETERIZATION     • COLONOSCOPY  2018    normal per patient   • EYE SURGERY     • HERNIA REPAIR Bilateral    • JOINT REPLACEMENT     • NECK SURGERY Right     TONSILS, MASS, LYMPH NODE 2016 SQUAMOS CELL CARCINOMA   • OTHER SURGICAL HISTORY      for jaw cancer   • TONSILLECTOMY  2016    cancer   • TOTAL KNEE ARTHROPLASTY Right 03/26/2021    Procedure: TOTAL KNEE ARTHROPLASTY;  Surgeon: Song Marsh MD;  Location: [facility];  Service: Orthopedics;  Laterality: Right;       Family History: family history includes Heart attack in his maternal grandfather. Otherwise pertinent FHx was reviewed and not pertinent to current issue.    Social History:  reports that he quit smoking about 6 years ago. His smoking use included cigarettes. He has a 45.00 pack-year smoking history. He has never used smokeless tobacco. He reports current alcohol use of about 5.0 standard drinks of alcohol per week. He reports that he does not use drugs.    Home Medications:   amLODIPine, amitriptyline, aspirin, carvedilol, cholecalciferol, esomeprazole, furosemide, gabapentin, hydrOXYzine, levothyroxine, losartan, ondansetron, oxyCODONE-acetaminophen, potassium chloride ER, pramipexole, rivaroxaban, triamcinolone, and vitamin B-12    Allergies:  Allergies   Allergen Reactions   • Bupropion Other (See Comments)     Rash          Objective      Vitals:  Temp:  [97.2 °F (36.2 °C)-98.3 °F (36.8 °C)] 98.2 °F (36.8 °C)  Heart Rate:  [56-87] 75  Resp:  [9-22] 20  BP: (120-156)/(72-97) 146/88  Flow (L/min):  [0-5] 0    Physical Exam  Vitals reviewed.   Constitutional:       Appearance: Normal appearance. He is obese.   HENT:      Head: Normocephalic and atraumatic.      Nose: Nose normal.      Mouth/Throat:      Mouth: Mucous membranes are moist.      Pharynx: Oropharynx is clear.   Eyes:      Extraocular Movements: Extraocular movements intact.       Conjunctiva/sclera: Conjunctivae normal.      Pupils: Pupils are equal, round, and reactive to light.   Cardiovascular:      Rate and Rhythm: Normal rate and regular rhythm.      Pulses: Normal pulses.      Heart sounds: Normal heart sounds.      Comments: S1, S2 audible  Pulmonary:      Effort: Pulmonary effort is normal.      Breath sounds: Normal breath sounds.      Comments: On room air   Abdominal:      General: Abdomen is flat. Bowel sounds are normal.      Palpations: Abdomen is soft.   Musculoskeletal:         General: Normal range of motion.      Cervical back: Normal range of motion.      Comments: Left knee ace wrap    Skin:     General: Skin is warm and dry.   Neurological:      General: No focal deficit present.      Mental Status: He is alert and oriented to person, place, and time. Mental status is at baseline.   Psychiatric:         Mood and Affect: Mood normal.         Behavior: Behavior normal.         Thought Content: Thought content normal.         Judgment: Judgment normal.         Result Review    Result Review:  I have personally reviewed the results from the time of this admission to 8/24/2022 08:22 EDT and agree with these findings:  [x]  Laboratory  []  Microbiology  []  Radiology  []  EKG/Telemetry   []  Cardiology/Vascular   []  Pathology  []  Old records  []  Other:        Assessment & Plan        Active Hospital Problems:  Active Hospital Problems    Diagnosis    • **Primary osteoarthritis of left knee    • Subclinical hypothyroidism    • CAD (coronary artery disease)    • Arthritis    • Esophageal reflux    • Obesity (BMI 30.0-34.9)    • Idiopathic peripheral neuropathy    • 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)    • Essential (primary) hypertension      Plan:     Primary osteoarthritis of the left knee  -Status post left total knee arthroplasty  -Neurovascular checks  -Fall risk precautions  -Pain medication per Ortho  -Check CBC and CMP  -Encourage incentive  spirometry  -Continue Xarelto  -Holding home meloxicam due to TERESITA   -PT ordered  -Ortho following    Acute kidney injury  - CR 1.6, monitor  - Baseline CR 1.0  - Avoid nephrotoxic medications  - IVF ordered   - Seen by a Nephrologist outpatient, but for BP not CKD     Essential hypertension  - Moderately controlled   -Monitor blood pressure  -Continue amlodipine and carvedilol  -Holding home losartan and Lasix due to TERESITA    Chronic CAD  -Continue carvedilol    Esophageal reflux  -No home medication noted    Idiopathic peripheral neuropathy  Arthritis  -Fall risk precautions    Primary squamous cell carcinoma the head neck  - Patient reports he had p[art of his jaw removed previously this year. He had received chemo and radiation for this in past.  - Encourage outpatient follow-up     Hypothyroidism  -Continue levothyroxine    Obesity  -BMI 32.1  -Encourage lifestyle changes    This patient has been examined wearing appropriate Personal Protective Equipment. 08/24/22      Signature: Electronically signed by NANCY Robles, 08/24/22, 8:22 AM EDT.

## 2022-08-24 NOTE — OUTREACH NOTE
Prep Survey    Flowsheet Row Responses   [facility] patient discharged from? [facility]   Is LACE score < 7 ? Yes   Emergency Room discharge w/ pulse ox? No   Eligibility [facility]   Date of Admission 08/23/22   Date of Discharge 08/24/22   Discharge Disposition Home or Self Care   Discharge diagnosis TOTAL KNEE ARTHROPLASTY    Does the patient have one of the following disease processes/diagnoses(primary or secondary)? Total Joint Replacement   Does the patient have Home health ordered? No  [[facility]]   Is there a DME ordered? No   Prep survey completed? Yes          SIA HARRIS - Registered Nurse

## 2022-08-24 NOTE — TELEPHONE ENCOUNTER
PATIENT CALLED AND STATED THAT HE RECEIVED A PHONE CALL FROM [facility] PHYSICAL THERAPY AND THEY HAVE HIM SCHEDULED FOR THERAPY TOMORROW.  PATIENT IS S/P LEFT TOTAL KNEE REPLACEMENT ON 8/23/22.  HE IS WANTING TO KNOW IF THIS IS TOO SOON FOR HIM TO START PHYSICAL THERAPY.    PLEASE ADVISE

## 2022-08-24 NOTE — PLAN OF CARE
Assessment: Jeyson Rose presents with functional mobility impairments which indicate the need for skilled intervention. Tolerating session today without incident. Pt with good understanding of therapeutic exercises with MIN VC for technique. Pt ambulated with Rwx safely in room. VC required for pacing, with some impulsive tendencies. Will continue to follow and progress as tolerated.

## 2022-08-24 NOTE — TELEPHONE ENCOUNTER
Please call the patient back and let him know that it is a good idea to start physical therapy almost right away after knee replacement surgery.  I do want him to regain maximum range of motion as early as possible.  Please go ahead with the physical therapy as scheduled.  Thank you

## 2022-08-25 ENCOUNTER — TRANSITIONAL CARE MANAGEMENT TELEPHONE ENCOUNTER (OUTPATIENT)
Dept: CALL CENTER | Facility: HOSPITAL | Age: 62
End: 2022-08-25

## 2022-08-25 LAB — QT INTERVAL: 457 MS

## 2022-08-25 NOTE — OUTREACH NOTE
Call Center TCM Note    Flowsheet Row Responses   [facility] facility patient discharged from? [facility]   Does the patient have one of the following disease processes/diagnoses(primary or secondary)? Total Joint Replacement   Joint surgery performed? Knee   TCM attempt successful? No   Unsuccessful attempts Attempt 2          Frances kAins RN    8/25/2022, 16:20 EDT

## 2022-08-25 NOTE — OUTREACH NOTE
Call Center TCM Note    Flowsheet Row Responses   [facility] facility patient discharged from? [facility]   Does the patient have one of the following disease processes/diagnoses(primary or secondary)? Total Joint Replacement   Joint surgery performed? Knee   TCM attempt successful? No   Unsuccessful attempts Attempt 1          Frances Akins RN    8/25/2022, 14:04 EDT

## 2022-08-26 ENCOUNTER — TRANSITIONAL CARE MANAGEMENT TELEPHONE ENCOUNTER (OUTPATIENT)
Dept: CALL CENTER | Facility: HOSPITAL | Age: 62
End: 2022-08-26

## 2022-08-26 NOTE — OUTREACH NOTE
Call Center TCM Note    Flowsheet Row Responses   [facility] facility patient discharged from? [facility]   Does the patient have one of the following disease processes/diagnoses(primary or secondary)? Total Joint Replacement   Joint surgery performed? Knee   TCM attempt successful? No   Unsuccessful attempts Attempt 3   Revoked Reason Phone Issues  [no working numbers]          Narinder Dye RN    8/26/2022, 09:46 EDT

## 2022-08-31 ENCOUNTER — OFFICE VISIT (OUTPATIENT)
Dept: ORTHOPEDIC SURGERY | Facility: CLINIC | Age: 62
End: 2022-08-31

## 2022-08-31 VITALS — HEIGHT: 68 IN | WEIGHT: 212 LBS | BODY MASS INDEX: 32.13 KG/M2

## 2022-08-31 DIAGNOSIS — Z96.652 S/P TKR (TOTAL KNEE REPLACEMENT), LEFT: Primary | ICD-10-CM

## 2022-08-31 PROCEDURE — 99024 POSTOP FOLLOW-UP VISIT: CPT | Performed by: ORTHOPAEDIC SURGERY

## 2022-08-31 RX ORDER — OXYCODONE HYDROCHLORIDE AND ACETAMINOPHEN 5; 325 MG/1; MG/1
1 TABLET ORAL EVERY 6 HOURS PRN
Qty: 40 TABLET | Refills: 0 | Status: SHIPPED | OUTPATIENT
Start: 2022-08-31 | End: 2022-11-11

## 2022-08-31 NOTE — PROGRESS NOTES
POST OP TOTAL GLOBAL      NAME: Jeyson Rose           : 1960    MRN: [id number]    Chief Complaint   Patient presents with   • Left Knee - Post-op   • Wound Check       Date of Surgery: 22  ?  HPI:   Patient returns today for 8 day follow up of left total knee arthroplasty Incision(s) healing nicely/as expected with no signs of infection. Patient reports doing well with no unusual complaints. No fevers, rigors or chills. Appears to be progressing appropriately. Patient is on appropriate anticoagulation.       Ortho Exam:   Left knee.The patient is status post total knee arthroplasty postoperative 8 day(s). Incision is clean. Calf is soft and nontender. Homans sign is negative. There is no clicking, popping or catching. Anterior and posterior drawer signs are negative.  There is no instability of the components. Appropriate amounts of swelling and bruising are noted. Dorsalis pedis and posterior tibial artery pulses are palpable. Common peroneal nerve function is well preserved. Range of motion is from 0-85 degrees of flexion. Gait is cautious but otherwise fairly normal. There is no evidence of a deep seated joint infection.      Diagnostic Studies:  xrays to be obtained at next visit        Assessment:  Diagnoses and all orders for this visit:    1. S/P TKR (total knee replacement), left (Primary)            Plan   · Incision care  · To use ACE wrap/MIRIAM stocking  · Continue ice to joint   · Stretching and strengthening exercises of the quads and hamstrings.  · Tablet Percocet 5/325 mg tab 1 p.o. every 8-12 as needed pain total 40 pills no refills.  · Controlled substance treatment options discussed in detail. Patient's signed consent to medical options on file. [facility] form in chart.  The patient is being provided this narcotic prescription to address the acute medical condition that they are undergoing/experiencing at this time.  It is my medical and surgical assessment that more than 3 days of  narcotic medication is necessary to help control the pain and discomfort that this patient is experiencing at this point.  Risks of narcotic medication usage outlined.  Possibility of physical and psychological dependence and abuse, especially long term, emphasized to the patient.  I have explained to the patient that we will try to wean them off the narcotic medication as soon as possible and introduce non-narcotic modalities of pain control.  At this point in the patient's clinical spectrum, however, alternative available treatments are inadequate to control their pain and symptoms.  I have also discussed the possibility of random drug testing as well as pill counts to prevent misuse and misappropriation of the narcotic medications prescribed to the patient.  · Aggressive ROM  · Falls precautions  · Once Xarelto is completed, change to an enteric coated Aspirin 325 mg daily until 6 weeks following your surgery  · Continue with lifelong antibiotic prophylaxis with dental procedures following total joint replacement.  · Follow up in 4 week(s)    Date of encounter: 08/31/2022   Song Marsh MD

## 2022-09-19 RX ORDER — POTASSIUM CHLORIDE 1500 MG/1
20 TABLET, FILM COATED, EXTENDED RELEASE ORAL DAILY
Qty: 30 TABLET | Refills: 0 | Status: SHIPPED | OUTPATIENT
Start: 2022-09-19

## 2022-09-28 ENCOUNTER — OFFICE VISIT (OUTPATIENT)
Dept: ORTHOPEDIC SURGERY | Facility: CLINIC | Age: 62
End: 2022-09-28

## 2022-09-28 DIAGNOSIS — Z96.652 S/P TKR (TOTAL KNEE REPLACEMENT), LEFT: Primary | ICD-10-CM

## 2022-09-28 PROCEDURE — 99024 POSTOP FOLLOW-UP VISIT: CPT | Performed by: ORTHOPAEDIC SURGERY

## 2022-09-28 NOTE — PROGRESS NOTES
"POST OP TOTAL GLOBAL      NAME: Jeyson Rose           : 1960    MRN: [id number]    Chief Complaint   Patient presents with   • Left Knee - Post-op       Date of Surgery: 2022  ?  HPI:   Patient returns today for 5 week follow up of left total knee arthroplasty Incision(s) healed nicely with no signs of infection. Patient reports doing well with no unusual complaints. No fevers, rigors or chills. Appears to be progressing appropriately. Patient is on appropriate anticoagulation.  He has done extremely well with physical therapy.  He states \"my new knee is absolutely.  \".      Ortho Exam:   Left knee.The patient is status post total knee arthroplasty postoperative 5 week(s). Incision is clean. Calf is soft and nontender. Homans sign is negative. There is no clicking, popping or catching. Anterior and posterior drawer signs are negative.  There is no instability of the components. Appropriate amounts of swelling and bruising are noted. Dorsalis pedis and posterior tibial artery pulses are palpable. Common peroneal nerve function is well preserved. Range of motion is from 0-110 degrees of flexion. Gait is cautious but otherwise fairly normal. There is no evidence of a deep seated joint infection.      Diagnostic Studies:  xrays obtained today    left Knee X-Ray  Indication: Evaluation of implant position after total knee arthroplasty.  AP, Lateral views  Findings: Excellent position of the implants with a good cement mantle without any subsidence of the implant.  no bony lesion  Soft tissues within normal limits  within normal limits joint spaces  Hardware appropriately positioned yes    yes prior studies available for comparison.    X-RAY was ordered and reviewed by Song Marsh MD    Assessment:  Diagnoses and all orders for this visit:    1. S/P TKR (total knee replacement), left (Primary)  -     XR Knee 3 View Left            Plan   · Incision care  · To use ACE wrap/MIRIAM stocking  · Continue ice " to joint   · Stretching and strengthening exercises  · Aggressive ROM  · Falls precautions  · Once Xarelto is completed, change to an enteric coated Aspirin 325 mg daily until 6 weeks following your surgery  · Continue with lifelong antibiotic prophylaxis with dental procedures following total joint replacement.  · Follow up in 12 month(s)    Date of encounter: 09/28/2022   Song Marsh MD

## 2022-10-20 ENCOUNTER — TRANSCRIBE ORDERS (OUTPATIENT)
Dept: ADMINISTRATIVE | Facility: HOSPITAL | Age: 62
End: 2022-10-20

## 2022-10-20 ENCOUNTER — LAB (OUTPATIENT)
Dept: LAB | Facility: HOSPITAL | Age: 62
End: 2022-10-20

## 2022-10-20 DIAGNOSIS — N18.30 STAGE 3 CHRONIC KIDNEY DISEASE, UNSPECIFIED WHETHER STAGE 3A OR 3B CKD: ICD-10-CM

## 2022-10-20 DIAGNOSIS — N18.30 STAGE 3 CHRONIC KIDNEY DISEASE, UNSPECIFIED WHETHER STAGE 3A OR 3B CKD: Primary | ICD-10-CM

## 2022-10-20 LAB
ANION GAP SERPL CALCULATED.3IONS-SCNC: 12 MMOL/L (ref 5–15)
BACTERIA UR QL AUTO: NORMAL /HPF
BASOPHILS # BLD AUTO: 0.05 10*3/MM3 (ref 0–0.2)
BASOPHILS NFR BLD AUTO: 0.9 % (ref 0–1.5)
BILIRUB UR QL STRIP: NEGATIVE
BUN SERPL-MCNC: 18 MG/DL (ref 8–23)
BUN/CREAT SERPL: 15 (ref 7–25)
CALCIUM SPEC-SCNC: 9.4 MG/DL (ref 8.6–10.5)
CHLORIDE SERPL-SCNC: 101 MMOL/L (ref 98–107)
CLARITY UR: CLEAR
CO2 SERPL-SCNC: 27 MMOL/L (ref 22–29)
COLOR UR: YELLOW
CREAT SERPL-MCNC: 1.2 MG/DL (ref 0.76–1.27)
CREAT UR-MCNC: 107.5 MG/DL
DEPRECATED RDW RBC AUTO: 41.9 FL (ref 37–54)
EGFRCR SERPLBLD CKD-EPI 2021: 67.5 ML/MIN/1.73
EOSINOPHIL # BLD AUTO: 0.14 10*3/MM3 (ref 0–0.4)
EOSINOPHIL NFR BLD AUTO: 2.5 % (ref 0.3–6.2)
ERYTHROCYTE [DISTWIDTH] IN BLOOD BY AUTOMATED COUNT: 12.8 % (ref 12.3–15.4)
GLUCOSE SERPL-MCNC: 88 MG/DL (ref 65–99)
GLUCOSE UR STRIP-MCNC: NEGATIVE MG/DL
HCT VFR BLD AUTO: 40.4 % (ref 37.5–51)
HGB BLD-MCNC: 13.6 G/DL (ref 13–17.7)
HGB UR QL STRIP.AUTO: NEGATIVE
HYALINE CASTS UR QL AUTO: NORMAL /LPF
IMM GRANULOCYTES # BLD AUTO: 0.01 10*3/MM3 (ref 0–0.05)
IMM GRANULOCYTES NFR BLD AUTO: 0.2 % (ref 0–0.5)
KETONES UR QL STRIP: NEGATIVE
LEUKOCYTE ESTERASE UR QL STRIP.AUTO: NEGATIVE
LYMPHOCYTES # BLD AUTO: 1.02 10*3/MM3 (ref 0.7–3.1)
LYMPHOCYTES NFR BLD AUTO: 17.9 % (ref 19.6–45.3)
MCH RBC QN AUTO: 30.4 PG (ref 26.6–33)
MCHC RBC AUTO-ENTMCNC: 33.7 G/DL (ref 31.5–35.7)
MCV RBC AUTO: 90.4 FL (ref 79–97)
MONOCYTES # BLD AUTO: 0.67 10*3/MM3 (ref 0.1–0.9)
MONOCYTES NFR BLD AUTO: 11.8 % (ref 5–12)
NEUTROPHILS NFR BLD AUTO: 3.81 10*3/MM3 (ref 1.7–7)
NEUTROPHILS NFR BLD AUTO: 66.7 % (ref 42.7–76)
NITRITE UR QL STRIP: NEGATIVE
NRBC BLD AUTO-RTO: 0 /100 WBC (ref 0–0.2)
PH UR STRIP.AUTO: 6 [PH] (ref 5–8)
PHOSPHATE SERPL-MCNC: 2.9 MG/DL (ref 2.5–4.5)
PLATELET # BLD AUTO: 224 10*3/MM3 (ref 140–450)
PMV BLD AUTO: 9.6 FL (ref 6–12)
POTASSIUM SERPL-SCNC: 3.8 MMOL/L (ref 3.5–5.2)
PROT ?TM UR-MCNC: 27.1 MG/DL
PROT UR QL STRIP: ABNORMAL
PROT/CREAT UR: 0.25 MG/G{CREAT}
PTH-INTACT SERPL-MCNC: 64.4 PG/ML (ref 15–65)
RBC # BLD AUTO: 4.47 10*6/MM3 (ref 4.14–5.8)
RBC # UR STRIP: NORMAL /HPF
REF LAB TEST METHOD: NORMAL
SODIUM SERPL-SCNC: 140 MMOL/L (ref 136–145)
SP GR UR STRIP: 1.02 (ref 1–1.03)
SQUAMOUS #/AREA URNS HPF: NORMAL /HPF
UROBILINOGEN UR QL STRIP: ABNORMAL
WBC # UR STRIP: NORMAL /HPF
WBC NRBC COR # BLD: 5.7 10*3/MM3 (ref 3.4–10.8)

## 2022-10-20 PROCEDURE — 36415 COLL VENOUS BLD VENIPUNCTURE: CPT

## 2022-10-20 PROCEDURE — 84100 ASSAY OF PHOSPHORUS: CPT

## 2022-10-20 PROCEDURE — 84156 ASSAY OF PROTEIN URINE: CPT

## 2022-10-20 PROCEDURE — 85025 COMPLETE CBC W/AUTO DIFF WBC: CPT

## 2022-10-20 PROCEDURE — 83970 ASSAY OF PARATHORMONE: CPT

## 2022-10-20 PROCEDURE — 80048 BASIC METABOLIC PNL TOTAL CA: CPT

## 2022-10-20 PROCEDURE — 81001 URINALYSIS AUTO W/SCOPE: CPT

## 2022-10-20 PROCEDURE — 82570 ASSAY OF URINE CREATININE: CPT

## 2022-10-28 DIAGNOSIS — E03.8 SUBCLINICAL HYPOTHYROIDISM: ICD-10-CM

## 2022-10-28 DIAGNOSIS — I10 ESSENTIAL (PRIMARY) HYPERTENSION: ICD-10-CM

## 2022-10-28 RX ORDER — AMLODIPINE BESYLATE 10 MG/1
10 TABLET ORAL DAILY
Qty: 90 TABLET | Refills: 1 | Status: SHIPPED | OUTPATIENT
Start: 2022-10-28

## 2022-10-28 RX ORDER — LEVOTHYROXINE SODIUM 0.05 MG/1
50 TABLET ORAL DAILY
Qty: 30 TABLET | Refills: 2 | Status: SHIPPED | OUTPATIENT
Start: 2022-10-28 | End: 2023-01-27

## 2022-11-11 ENCOUNTER — OFFICE VISIT (OUTPATIENT)
Dept: FAMILY MEDICINE CLINIC | Facility: CLINIC | Age: 62
End: 2022-11-11

## 2022-11-11 VITALS
SYSTOLIC BLOOD PRESSURE: 162 MMHG | HEIGHT: 68 IN | OXYGEN SATURATION: 97 % | HEART RATE: 57 BPM | WEIGHT: 212.4 LBS | BODY MASS INDEX: 32.19 KG/M2 | DIASTOLIC BLOOD PRESSURE: 84 MMHG | TEMPERATURE: 97.8 F

## 2022-11-11 DIAGNOSIS — I10 ESSENTIAL (PRIMARY) HYPERTENSION: Chronic | ICD-10-CM

## 2022-11-11 DIAGNOSIS — Z12.12 ENCOUNTER FOR COLORECTAL CANCER SCREENING: ICD-10-CM

## 2022-11-11 DIAGNOSIS — K21.9 GASTROESOPHAGEAL REFLUX DISEASE WITHOUT ESOPHAGITIS: Chronic | ICD-10-CM

## 2022-11-11 DIAGNOSIS — Z11.59 ENCOUNTER FOR HEPATITIS C SCREENING TEST FOR LOW RISK PATIENT: ICD-10-CM

## 2022-11-11 DIAGNOSIS — Z12.11 ENCOUNTER FOR COLORECTAL CANCER SCREENING: ICD-10-CM

## 2022-11-11 DIAGNOSIS — Z00.00 MEDICARE ANNUAL WELLNESS VISIT, SUBSEQUENT: Primary | ICD-10-CM

## 2022-11-11 DIAGNOSIS — E03.8 SUBCLINICAL HYPOTHYROIDISM: Chronic | ICD-10-CM

## 2022-11-11 DIAGNOSIS — Z12.5 SCREENING PSA (PROSTATE SPECIFIC ANTIGEN): ICD-10-CM

## 2022-11-11 DIAGNOSIS — I25.10 CORONARY ARTERY DISEASE INVOLVING NATIVE CORONARY ARTERY OF NATIVE HEART WITHOUT ANGINA PECTORIS: Chronic | ICD-10-CM

## 2022-11-11 DIAGNOSIS — Z87.891 FORMER SMOKER: ICD-10-CM

## 2022-11-11 DIAGNOSIS — Z23 NEED FOR INFLUENZA VACCINATION: ICD-10-CM

## 2022-11-11 LAB
ALBUMIN SERPL-MCNC: 4.3 G/DL (ref 3.5–5.2)
ALBUMIN/GLOB SERPL: 1.4 G/DL
ALP SERPL-CCNC: 71 U/L (ref 39–117)
ALT SERPL W P-5'-P-CCNC: 23 U/L (ref 1–41)
ANION GAP SERPL CALCULATED.3IONS-SCNC: 11.6 MMOL/L (ref 5–15)
AST SERPL-CCNC: 25 U/L (ref 1–40)
BILIRUB SERPL-MCNC: 0.4 MG/DL (ref 0–1.2)
BUN SERPL-MCNC: 19 MG/DL (ref 8–23)
BUN/CREAT SERPL: 16.5 (ref 7–25)
CALCIUM SPEC-SCNC: 9.8 MG/DL (ref 8.6–10.5)
CHLORIDE SERPL-SCNC: 102 MMOL/L (ref 98–107)
CO2 SERPL-SCNC: 26.4 MMOL/L (ref 22–29)
CREAT SERPL-MCNC: 1.15 MG/DL (ref 0.76–1.27)
EGFRCR SERPLBLD CKD-EPI 2021: 71.1 ML/MIN/1.73
GLOBULIN UR ELPH-MCNC: 3 GM/DL
GLUCOSE SERPL-MCNC: 108 MG/DL (ref 65–99)
HCV AB SER DONR QL: NORMAL
POTASSIUM SERPL-SCNC: 3.9 MMOL/L (ref 3.5–5.2)
PROT SERPL-MCNC: 7.3 G/DL (ref 6–8.5)
PSA SERPL-MCNC: 0.69 NG/ML (ref 0–4)
SODIUM SERPL-SCNC: 140 MMOL/L (ref 136–145)
T4 FREE SERPL-MCNC: 1.3 NG/DL (ref 0.93–1.7)
TSH SERPL DL<=0.05 MIU/L-ACNC: 4.72 UIU/ML (ref 0.27–4.2)

## 2022-11-11 PROCEDURE — 80053 COMPREHEN METABOLIC PANEL: CPT | Performed by: NURSE PRACTITIONER

## 2022-11-11 PROCEDURE — 84443 ASSAY THYROID STIM HORMONE: CPT | Performed by: NURSE PRACTITIONER

## 2022-11-11 PROCEDURE — 96160 PT-FOCUSED HLTH RISK ASSMT: CPT | Performed by: NURSE PRACTITIONER

## 2022-11-11 PROCEDURE — G0439 PPPS, SUBSEQ VISIT: HCPCS | Performed by: NURSE PRACTITIONER

## 2022-11-11 PROCEDURE — 1160F RVW MEDS BY RX/DR IN RCRD: CPT | Performed by: NURSE PRACTITIONER

## 2022-11-11 PROCEDURE — 1170F FXNL STATUS ASSESSED: CPT | Performed by: NURSE PRACTITIONER

## 2022-11-11 PROCEDURE — 36415 COLL VENOUS BLD VENIPUNCTURE: CPT | Performed by: NURSE PRACTITIONER

## 2022-11-11 PROCEDURE — 90686 IIV4 VACC NO PRSV 0.5 ML IM: CPT | Performed by: NURSE PRACTITIONER

## 2022-11-11 PROCEDURE — 84439 ASSAY OF FREE THYROXINE: CPT | Performed by: NURSE PRACTITIONER

## 2022-11-11 PROCEDURE — G0103 PSA SCREENING: HCPCS | Performed by: NURSE PRACTITIONER

## 2022-11-11 PROCEDURE — G0008 ADMIN INFLUENZA VIRUS VAC: HCPCS | Performed by: NURSE PRACTITIONER

## 2022-11-11 PROCEDURE — 1126F AMNT PAIN NOTED NONE PRSNT: CPT | Performed by: NURSE PRACTITIONER

## 2022-11-11 PROCEDURE — 86803 HEPATITIS C AB TEST: CPT | Performed by: NURSE PRACTITIONER

## 2022-11-11 NOTE — PROGRESS NOTES
The ABCs of the Annual Wellness Visit  Subsequent Medicare Wellness Visit    Chief Complaint   Patient presents with   • Medicare Wellness-subsequent   • Hypertension   • Hypothyroidism      Subjective    History of Present Illness:  Jeyson Rose is a 62 y.o. male who presents for a Subsequent Medicare Wellness Visit.    The following portions of the patient's history were reviewed and   updated as appropriate: allergies, current medications, past family history, past medical history, past social history, past surgical history and problem list.    Compared to one year ago, the patient feels his physical   health is better.    Compared to one year ago, the patient feels his mental   health is the same.    Patient's blood pressure is elevated today 150/84.  When rechecking was slightly more elevated at 164/86.  He denies chest pain, syncope, palpitations.  He did not take his Lasix medication since he came to the office this morning.    Recent Hospitalizations:  He was admitted within the past 365 days at [facility]. [facility]. Knee replacement       Current Medical Providers:  Patient Care Team:  Rajendra Sam APRN as PCP - General (Nurse Practitioner)  Houston Martini MD as Consulting Physician (Cardiac Electrophysiology)    Outpatient Medications Prior to Visit   Medication Sig Dispense Refill   • amitriptyline (ELAVIL) 25 MG tablet Take 50 mg by mouth Every Night.     • amLODIPine (NORVASC) 10 MG tablet TAKE 1 TABLET BY MOUTH DAILY 90 tablet 1   • aspirin 81 MG EC tablet Take 81 mg by mouth Daily.     • carvedilol (COREG) 12.5 MG tablet TAKE 1 TABLET BY MOUTH TWICE DAILY WITH MEALS (Patient taking differently: Take 1 tablet by mouth 2 (Two) Times a Day With Meals.) 90 tablet 1   • cholecalciferol (VITAMIN D3) 10 MCG (400 UNIT) tablet Take 1 tablet by mouth Daily.     • esomeprazole (nexIUM) 40 MG capsule Take 1 capsule by mouth Every Morning Before Breakfast. 90 capsule 1   •  furosemide (LASIX) 20 MG tablet TAKE 1 TABLET BY MOUTH DAILY (Patient taking differently: Take 1 tablet by mouth Every Morning.) 90 tablet 0   • gabapentin (NEURONTIN) 300 MG capsule Take 1 capsule by mouth 2 (Two) Times a Day.     • hydrOXYzine (ATARAX) 25 MG tablet Take 1 tablet by mouth 3 (Three) Times a Day As Needed for Itching. 30 tablet 1   • levothyroxine (SYNTHROID, LEVOTHROID) 50 MCG tablet TAKE 1 TABLET BY MOUTH DAILY 30 tablet 2   • losartan (COZAAR) 100 MG tablet TAKE 1 TABLET BY MOUTH DAILY (Patient taking differently: Take 1 tablet by mouth Every Morning.) 90 tablet 1   • ondansetron (Zofran) 4 MG tablet Take 1 tablet by mouth Every 6 (Six) Hours As Needed for Nausea or Vomiting. 30 tablet 0   • potassium chloride ER (K-TAB) 20 MEQ tablet controlled-release ER tablet TAKE 1 TABLET BY MOUTH DAILY 30 tablet 0   • pramipexole (MIRAPEX) 0.25 MG tablet Take 0.25 mg by mouth At Night As Needed.     • oxyCODONE-acetaminophen (PERCOCET) 5-325 MG per tablet Take 1 tablet by mouth Every 6 (Six) Hours As Needed for Moderate Pain or Severe Pain. 40 tablet 0   • rivaroxaban (Xarelto) 10 MG tablet Take 1 tablet by mouth Daily. 10 tablet 0   • triamcinolone (KENALOG) 0.1 % cream Apply 1 application topically to the appropriate area as directed 2 (Two) Times a Day. 45 g 1   • vitamin B-12 (CYANOCOBALAMIN) 100 MCG tablet Take 1 tablet by mouth Daily.       Facility-Administered Medications Prior to Visit   Medication Dose Route Frequency Provider Last Rate Last Admin   • Chlorhexidine Gluconate 2 % pads 2 each  2 pad Apply externally BID Song Marsh MD           No opioid medication identified on active medication list. I have reviewed chart for other potential  high risk medication/s and harmful drug interactions in the elderly.          Aspirin is on active medication list. Aspirin use is indicated based on review of current medical condition/s. Pros and cons of this therapy have been discussed today. Benefits of  "this medication outweigh potential harm.  Patient has been encouraged to continue taking this medication.  .      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  ACP discussion was held with the patient during this visit. Patient does not have an advance directive, information provided.          Objective    Vitals:    11/11/22 0812 11/11/22 0900 11/11/22 0901   BP: 150/84 164/86 162/84   BP Location: Left arm Right arm Left arm   Patient Position: Sitting Sitting Sitting   Pulse: 57     Temp: 97.8 °F (36.6 °C)     TempSrc: Oral     SpO2: 97%     Weight: 96.3 kg (212 lb 6.4 oz)     Height: 172.7 cm (68\")     PainSc: 0-No pain       Estimated body mass index is 32.3 kg/m² as calculated from the following:    Height as of this encounter: 172.7 cm (68\").    Weight as of this encounter: 96.3 kg (212 lb 6.4 oz).    BMI is >= 30 and <35. (Class 1 Obesity). The following options were offered after discussion;: exercise counseling/recommendations and nutrition counseling/recommendations      Does the patient have evidence of cognitive impairment? No    Physical Exam            HEALTH RISK ASSESSMENT    Smoking Status:  Social History     Tobacco Use   Smoking Status Former   • Packs/day: 1.50   • Years: 30.00   • Pack years: 45.00   • Types: Cigarettes   • " Quit date:    • Years since quittin.8   Smokeless Tobacco Never     Alcohol Consumption:  Social History     Substance and Sexual Activity   Alcohol Use Yes   • Alcohol/week: 5.0 standard drinks   • Types: 5 Cans of beer per week     Fall Risk Screen:    BRITTANY Fall Risk Assessment was completed, and patient is at LOW risk for falls.Assessment completed on:2022    Depression Screening:  PHQ-2/PHQ-9 Depression Screening 2022   Retired PHQ-9 Total Score -   Retired Total Score -   Little Interest or Pleasure in Doing Things 0-->not at all   Feeling Down, Depressed or Hopeless 0-->not at all   PHQ-9: Brief Depression Severity Measure Score 0       Health Habits and Functional and Cognitive Screening:  Functional & Cognitive Status 2022   Do you have difficulty preparing food and eating? No   Do you have difficulty bathing yourself, getting dressed or grooming yourself? No   Do you have difficulty using the toilet? No   Do you have difficulty moving around from place to place? No   Do you have trouble with steps or getting out of a bed or a chair? No   Current Diet Well Balanced Diet   Dental Exam Up to date   Eye Exam Not up to date   Exercise (times per week) 7 times per week   Current Exercises Include Other   Do you need help using the phone?  No   Are you deaf or do you have serious difficulty hearing?  No   Do you need help with transportation? No   Do you need help shopping? No   Do you need help preparing meals?  No   Do you need help with housework?  No   Do you need help with laundry? No   Do you need help taking your medications? No   Do you need help managing money? No   Do you ever drive or ride in a car without wearing a seat belt? No   Have you felt unusual stress, anger or loneliness in the last month? No   Who do you live with? Spouse   If you need help, do you have trouble finding someone available to you? No   Have you been bothered in the last four weeks by sexual problems?  Yes   Do you have difficulty concentrating, remembering or making decisions? No       Age-appropriate Screening Schedule:  Refer to the list below for future screening recommendations based on patient's age, sex and/or medical conditions. Orders for these recommended tests are listed in the plan section. The patient has been provided with a written plan.    Health Maintenance   Topic Date Due   • TDAP/TD VACCINES (1 - Tdap) Never done   • ZOSTER VACCINE (1 of 2) Never done   • INFLUENZA VACCINE  Completed              Assessment & Plan   CMS Preventative Services Quick Reference  Risk Factors Identified During Encounter  Cardiovascular Disease  Immunizations Discussed/Encouraged (specific Immunizations; Tdap, Influenza and Shingrix  Obesity/Overweight   Polypharmacy  The above risks/problems have been discussed with the patient.  Follow up actions/plans if indicated are seen below in the Assessment/Plan Section.  Pertinent information has been shared with the patient in the After Visit Summary.    Diagnoses and all orders for this visit:    1. Medicare annual wellness visit, subsequent (Primary)    2. Essential (primary) hypertension  Assessment & Plan:  Blood pressure is elevated today.  He does not check his blood pressure at home.  Discussed with him monitoring blood pressure at home may be elevated due to missing his dose of Lasix.  If blood pressure remains greater than 140/90 to follow back up in office or contact cardiologist to increase antihypertensive medication    Orders:  -     Comprehensive Metabolic Panel    3. Coronary artery disease involving native coronary artery of native heart without angina pectoris    4. Subclinical hypothyroidism  -     TSH+Free T4    5. Gastroesophageal reflux disease without esophagitis    6. Screening PSA (prostate specific antigen)  -     PSA SCREENING    7. Encounter for hepatitis C screening test for low risk patient  -     Hepatitis C Antibody    8. Former smoker  -       CT Chest Low Dose Cancer Screening WO; Future    9. Encounter for colorectal cancer screening  -     Ambulatory Referral For Screening Colonoscopy    10. Need for influenza vaccination  -     FluLaval/Fluzone >6 mos ([id number])      Follow Up:   Return in about 3 months (around 2/11/2023), or if symptoms worsen or fail to improve, for Next scheduled follow up.     An After Visit Summary and PPPS were made available to the patient.

## 2022-11-11 NOTE — ASSESSMENT & PLAN NOTE
Blood pressure is elevated today.  He does not check his blood pressure at home.  Discussed with him monitoring blood pressure at home may be elevated due to missing his dose of Lasix.  If blood pressure remains greater than 140/90 to follow back up in office or contact cardiologist to increase antihypertensive medication

## 2022-11-18 ENCOUNTER — HOSPITAL ENCOUNTER (OUTPATIENT)
Dept: CT IMAGING | Facility: HOSPITAL | Age: 62
Discharge: HOME | End: 2022-11-18
Admitting: NURSE PRACTITIONER

## 2022-11-18 DIAGNOSIS — Z87.891 FORMER SMOKER: ICD-10-CM

## 2022-11-18 PROCEDURE — 71271 CT THORAX LUNG CANCER SCR C-: CPT

## 2022-12-07 DIAGNOSIS — I10 ESSENTIAL (PRIMARY) HYPERTENSION: ICD-10-CM

## 2022-12-11 RX ORDER — LOSARTAN POTASSIUM 100 MG/1
100 TABLET ORAL
Qty: 90 TABLET | Refills: 1 | Status: SHIPPED | OUTPATIENT
Start: 2022-12-11

## 2023-01-18 ENCOUNTER — OFFICE VISIT (OUTPATIENT)
Dept: FAMILY MEDICINE CLINIC | Facility: CLINIC | Age: 63
End: 2023-01-18
Payer: MEDICARE

## 2023-01-18 VITALS
HEART RATE: 74 BPM | BODY MASS INDEX: 32.13 KG/M2 | OXYGEN SATURATION: 95 % | DIASTOLIC BLOOD PRESSURE: 78 MMHG | SYSTOLIC BLOOD PRESSURE: 130 MMHG | HEIGHT: 68 IN | TEMPERATURE: 98.9 F | WEIGHT: 212 LBS

## 2023-01-18 DIAGNOSIS — J06.9 VIRAL UPPER RESPIRATORY TRACT INFECTION: Primary | ICD-10-CM

## 2023-01-18 DIAGNOSIS — R05.1 ACUTE COUGH: ICD-10-CM

## 2023-01-18 DIAGNOSIS — R09.81 SINUS CONGESTION: ICD-10-CM

## 2023-01-18 LAB
EXPIRATION DATE: NORMAL
EXPIRATION DATE: NORMAL
FLUAV AG NPH QL: NEGATIVE
FLUBV AG NPH QL: NEGATIVE
INTERNAL CONTROL: NORMAL
INTERNAL CONTROL: NORMAL
Lab: NORMAL
Lab: NORMAL
SARS-COV-2 AG UPPER RESP QL IA.RAPID: NOT DETECTED

## 2023-01-18 PROCEDURE — 87426 SARSCOV CORONAVIRUS AG IA: CPT | Performed by: NURSE PRACTITIONER

## 2023-01-18 PROCEDURE — 99214 OFFICE O/P EST MOD 30 MIN: CPT | Performed by: NURSE PRACTITIONER

## 2023-01-18 PROCEDURE — 87804 INFLUENZA ASSAY W/OPTIC: CPT | Performed by: NURSE PRACTITIONER

## 2023-01-18 RX ORDER — BROMPHENIRAMINE MALEATE, PSEUDOEPHEDRINE HYDROCHLORIDE, AND DEXTROMETHORPHAN HYDROBROMIDE 2; 30; 10 MG/5ML; MG/5ML; MG/5ML
5 SYRUP ORAL 4 TIMES DAILY PRN
Qty: 250 ML | Refills: 1 | Status: SHIPPED | OUTPATIENT
Start: 2023-01-18 | End: 2023-03-08

## 2023-01-18 RX ORDER — ALBUTEROL SULFATE 90 UG/1
2 AEROSOL, METERED RESPIRATORY (INHALATION) EVERY 4 HOURS PRN
Qty: 8 G | Refills: 0 | Status: SHIPPED | OUTPATIENT
Start: 2023-01-18

## 2023-01-18 RX ORDER — GUAIFENESIN 600 MG/1
1200 TABLET, EXTENDED RELEASE ORAL 2 TIMES DAILY
Qty: 20 TABLET | Refills: 0 | Status: SHIPPED | OUTPATIENT
Start: 2023-01-18 | End: 2023-03-08

## 2023-01-18 RX ORDER — FEXOFENADINE HCL 180 MG/1
180 TABLET ORAL DAILY
Qty: 90 TABLET | Refills: 0 | Status: SHIPPED | OUTPATIENT
Start: 2023-01-18

## 2023-01-18 RX ORDER — TRIAMCINOLONE ACETONIDE 1 MG/G
CREAM TOPICAL 2 TIMES DAILY
COMMUNITY
Start: 2022-11-26

## 2023-01-18 NOTE — PROGRESS NOTES
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     Surgical History: has a past surgical history that includes Neck surgery (Right); Cardiac catheterization; Tonsillectomy (2016); Hernia repair (Bilateral); Colonoscopy (2018); Eye surgery; Total knee arthroplasty (Right, 03/26/2021); Other surgical history; Joint replacement; and Total knee arthroplasty (Left, 8/23/2022).     Family History: family history includes Heart attack in his maternal grandfather.     Social History: reports that he quit smoking about 7 years ago. His smoking use included cigarettes. He has a 45.00 pack-year smoking history. He has never used smokeless tobacco. He reports current alcohol use of about 5.0 standard drinks per week. He reports that he does not use drugs.    Jeyson WALL Milton presents to [facility]  Cough  This is a new problem. The current episode started in the past 7 days. The problem has been waxing and waning. The problem occurs constantly. The cough is non-productive. Associated symptoms include ear congestion, headaches, nasal congestion, postnasal drip and rhinorrhea. Nothing aggravates the symptoms. Risk factors for lung disease include smoking/tobacco exposure (previous smoker). The treatment provided no relief.   URI   This is a new problem. The current episode started in the past 7 days. The problem has been waxing and waning. There has been no fever. Associated symptoms include coughing, headaches,  "rhinorrhea, sinus pain and sneezing. Pertinent negatives include no nausea, neck pain or vomiting. He has tried nothing for the symptoms. The treatment provided no relief.       Objective   Vital Signs:   /78 (BP Location: Right arm, Patient Position: Sitting, Cuff Size: Adult)   Pulse 74   Temp 98.9 °F (37.2 °C) (Oral)   Ht 172.7 cm (68\")   Wt 96.2 kg (212 lb)   SpO2 95%   BMI 32.23 kg/m²       Wt Readings from Last 3 Encounters:   01/18/23 96.2 kg (212 lb)   11/11/22 96.3 kg (212 lb 6.4 oz)   08/31/22 96.2 kg (212 lb)        BP Readings from Last 3 Encounters:   01/18/23 130/78   11/11/22 162/84   08/24/22 132/85               Physical Exam  Vitals reviewed.   Constitutional:       Appearance: Normal appearance. He is well-developed.   HENT:      Head: Normocephalic and atraumatic.      Nose: Congestion present.      Mouth/Throat:      Mouth: Mucous membranes are moist.      Comments: Clear post nasal drip  Eyes:      Conjunctiva/sclera: Conjunctivae normal.      Pupils: Pupils are equal, round, and reactive to light.   Cardiovascular:      Rate and Rhythm: Normal rate and regular rhythm.      Heart sounds: No murmur heard.  Pulmonary:      Effort: Pulmonary effort is normal.      Breath sounds: Normal breath sounds. No wheezing or rhonchi.      Comments: Coughing on exam  Skin:     General: Skin is warm and dry.   Neurological:      Mental Status: He is alert and oriented to person, place, and time.   Psychiatric:         Mood and Affect: Mood and affect normal.         Behavior: Behavior normal.         Thought Content: Thought content normal.         Judgment: Judgment normal.        Result Review :  {The following data was reviewed by NANCY Lancaster on 01/18/2023.  Covid Tests    Common Labsle 1/18/2023   COVID19 Not Detected           Lab Results   Component Value Date    RAPFLUA Negative 01/18/2023    RAPFLUB Negative 01/18/2023                     Current Outpatient Medications on " File Prior to Visit   Medication Sig Dispense Refill   • amitriptyline (ELAVIL) 25 MG tablet Take 50 mg by mouth Every Night.     • amLODIPine (NORVASC) 10 MG tablet TAKE 1 TABLET BY MOUTH DAILY 90 tablet 1   • aspirin 81 MG EC tablet Take 81 mg by mouth Daily.     • carvedilol (COREG) 12.5 MG tablet TAKE 1 TABLET BY MOUTH TWICE DAILY WITH MEALS (Patient taking differently: Take 12.5 mg by mouth 2 (Two) Times a Day With Meals.) 90 tablet 1   • esomeprazole (nexIUM) 40 MG capsule Take 1 capsule by mouth Every Morning Before Breakfast. 90 capsule 1   • gabapentin (NEURONTIN) 300 MG capsule Take 1 capsule by mouth 2 (Two) Times a Day.     • hydrOXYzine (ATARAX) 25 MG tablet Take 1 tablet by mouth 3 (Three) Times a Day As Needed for Itching. 30 tablet 1   • levothyroxine (SYNTHROID, LEVOTHROID) 50 MCG tablet TAKE 1 TABLET BY MOUTH DAILY 30 tablet 2   • losartan (COZAAR) 100 MG tablet Take 1 tablet by mouth Every Morning. 90 tablet 1   • triamcinolone (KENALOG) 0.1 % cream Apply  topically to the appropriate area as directed 2 (Two) Times a Day.     • cholecalciferol (VITAMIN D3) 10 MCG (400 UNIT) tablet Take 1 tablet by mouth Daily.     • furosemide (LASIX) 20 MG tablet TAKE 1 TABLET BY MOUTH DAILY (Patient taking differently: Take 20 mg by mouth Every Morning.) 90 tablet 0   • ondansetron (Zofran) 4 MG tablet Take 1 tablet by mouth Every 6 (Six) Hours As Needed for Nausea or Vomiting. 30 tablet 0   • potassium chloride ER (K-TAB) 20 MEQ tablet controlled-release ER tablet TAKE 1 TABLET BY MOUTH DAILY 30 tablet 0     Current Facility-Administered Medications on File Prior to Visit   Medication Dose Route Frequency Provider Last Rate Last Admin   • Chlorhexidine Gluconate 2 % pads 2 each  2 pad Apply externally BID Song Marsh MD            Assessment and Plan  Diagnoses and all orders for this visit:    1. Viral upper respiratory tract infection (Primary)  Comments:  We will treat with albuterol inhaler, Mucinex,  Allegra and Bromfed did discuss if symptoms get worse call office and we can send over antibiotic.  Patient verba  Orders:  -     POCT Influenza A/B  -     POCT SARS-CoV-2 Antigen CHIRAG    2. Acute cough  Comments:  We will treat with Bromfed, Allegra, Mucinex.  Discussed return precautions.  Patient verbalized understanding  Orders:  -     POCT Influenza A/B  -     POCT SARS-CoV-2 Antigen CHIRAG    3. Sinus congestion  -     POCT Influenza A/B  -     POCT SARS-CoV-2 Antigen CHIRAG    Other orders  -     guaiFENesin (Mucinex) 600 MG 12 hr tablet; Take 2 tablets by mouth 2 (Two) Times a Day.  Dispense: 20 tablet; Refill: 0  -     brompheniramine-pseudoephedrine-DM 30-2-10 MG/5ML syrup; Take 5 mL by mouth 4 (Four) Times a Day As Needed for Congestion or Allergies.  Dispense: 250 mL; Refill: 1  -     fexofenadine (Allegra Allergy) 180 MG tablet; Take 1 tablet by mouth Daily.  Dispense: 90 tablet; Refill: 0  -     albuterol sulfate  (90 Base) MCG/ACT inhaler; Inhale 2 puffs Every 4 (Four) Hours As Needed for Wheezing.  Dispense: 8 g; Refill: 0        Follow Up   Return for If symptoms do not improve new concerning symptoms.  Patient was given instructions and counseling regarding his condition or for health maintenance advice. Please see specific information pulled into the AVS if appropriate.       Part of this note may be electronic transcription/translation of spoken language to printed text using the Dragon dictation system

## 2023-01-26 DIAGNOSIS — E03.8 SUBCLINICAL HYPOTHYROIDISM: ICD-10-CM

## 2023-01-27 RX ORDER — LEVOTHYROXINE SODIUM 0.05 MG/1
50 TABLET ORAL DAILY
Qty: 90 TABLET | Refills: 1 | Status: SHIPPED | OUTPATIENT
Start: 2023-01-27

## 2023-02-16 DIAGNOSIS — I10 ESSENTIAL (PRIMARY) HYPERTENSION: ICD-10-CM

## 2023-02-16 RX ORDER — CARVEDILOL 12.5 MG/1
TABLET ORAL
Qty: 90 TABLET | Refills: 1 | Status: SHIPPED | OUTPATIENT
Start: 2023-02-16

## 2023-03-08 ENCOUNTER — OFFICE VISIT (OUTPATIENT)
Dept: FAMILY MEDICINE CLINIC | Facility: CLINIC | Age: 63
End: 2023-03-08
Payer: MEDICARE

## 2023-03-08 VITALS
WEIGHT: 214.8 LBS | OXYGEN SATURATION: 99 % | TEMPERATURE: 98.3 F | HEART RATE: 65 BPM | SYSTOLIC BLOOD PRESSURE: 142 MMHG | BODY MASS INDEX: 32.55 KG/M2 | HEIGHT: 68 IN | DIASTOLIC BLOOD PRESSURE: 84 MMHG

## 2023-03-08 DIAGNOSIS — R73.9 ELEVATED BLOOD SUGAR: ICD-10-CM

## 2023-03-08 DIAGNOSIS — E03.8 SUBCLINICAL HYPOTHYROIDISM: Primary | ICD-10-CM

## 2023-03-08 DIAGNOSIS — I10 ESSENTIAL (PRIMARY) HYPERTENSION: ICD-10-CM

## 2023-03-08 LAB
ALBUMIN SERPL-MCNC: 4.5 G/DL (ref 3.5–5.2)
ALBUMIN/GLOB SERPL: 1.5 G/DL
ALP SERPL-CCNC: 69 U/L (ref 39–117)
ALT SERPL W P-5'-P-CCNC: 15 U/L (ref 1–41)
ANION GAP SERPL CALCULATED.3IONS-SCNC: 6.7 MMOL/L (ref 5–15)
AST SERPL-CCNC: 17 U/L (ref 1–40)
BASOPHILS # BLD AUTO: 0.05 10*3/MM3 (ref 0–0.2)
BASOPHILS NFR BLD AUTO: 0.7 % (ref 0–1.5)
BILIRUB SERPL-MCNC: 0.5 MG/DL (ref 0–1.2)
BUN SERPL-MCNC: 22 MG/DL (ref 8–23)
BUN/CREAT SERPL: 14.4 (ref 7–25)
CALCIUM SPEC-SCNC: 9.9 MG/DL (ref 8.6–10.5)
CHLORIDE SERPL-SCNC: 100 MMOL/L (ref 98–107)
CO2 SERPL-SCNC: 32.3 MMOL/L (ref 22–29)
CREAT SERPL-MCNC: 1.53 MG/DL (ref 0.76–1.27)
DEPRECATED RDW RBC AUTO: 42.6 FL (ref 37–54)
EGFRCR SERPLBLD CKD-EPI 2021: 50.5 ML/MIN/1.73
EOSINOPHIL # BLD AUTO: 0.35 10*3/MM3 (ref 0–0.4)
EOSINOPHIL NFR BLD AUTO: 5.2 % (ref 0.3–6.2)
ERYTHROCYTE [DISTWIDTH] IN BLOOD BY AUTOMATED COUNT: 13.2 % (ref 12.3–15.4)
GLOBULIN UR ELPH-MCNC: 3 GM/DL
GLUCOSE SERPL-MCNC: 92 MG/DL (ref 65–99)
HBA1C MFR BLD: 5.6 % (ref 4.8–5.6)
HCT VFR BLD AUTO: 44.1 % (ref 37.5–51)
HGB BLD-MCNC: 15.2 G/DL (ref 13–17.7)
IMM GRANULOCYTES # BLD AUTO: 0.03 10*3/MM3 (ref 0–0.05)
IMM GRANULOCYTES NFR BLD AUTO: 0.4 % (ref 0–0.5)
LYMPHOCYTES # BLD AUTO: 0.91 10*3/MM3 (ref 0.7–3.1)
LYMPHOCYTES NFR BLD AUTO: 13.5 % (ref 19.6–45.3)
MCH RBC QN AUTO: 30.2 PG (ref 26.6–33)
MCHC RBC AUTO-ENTMCNC: 34.5 G/DL (ref 31.5–35.7)
MCV RBC AUTO: 87.7 FL (ref 79–97)
MONOCYTES # BLD AUTO: 0.74 10*3/MM3 (ref 0.1–0.9)
MONOCYTES NFR BLD AUTO: 11 % (ref 5–12)
NEUTROPHILS NFR BLD AUTO: 4.65 10*3/MM3 (ref 1.7–7)
NEUTROPHILS NFR BLD AUTO: 69.2 % (ref 42.7–76)
NRBC BLD AUTO-RTO: 0 /100 WBC (ref 0–0.2)
PLATELET # BLD AUTO: 217 10*3/MM3 (ref 140–450)
PMV BLD AUTO: 9.9 FL (ref 6–12)
POTASSIUM SERPL-SCNC: 3.9 MMOL/L (ref 3.5–5.2)
PROT SERPL-MCNC: 7.5 G/DL (ref 6–8.5)
RBC # BLD AUTO: 5.03 10*6/MM3 (ref 4.14–5.8)
SODIUM SERPL-SCNC: 139 MMOL/L (ref 136–145)
T4 FREE SERPL-MCNC: 1.36 NG/DL (ref 0.93–1.7)
TSH SERPL DL<=0.05 MIU/L-ACNC: 3.29 UIU/ML (ref 0.27–4.2)
WBC NRBC COR # BLD: 6.73 10*3/MM3 (ref 3.4–10.8)

## 2023-03-08 PROCEDURE — 85025 COMPLETE CBC W/AUTO DIFF WBC: CPT | Performed by: NURSE PRACTITIONER

## 2023-03-08 PROCEDURE — 83036 HEMOGLOBIN GLYCOSYLATED A1C: CPT | Performed by: NURSE PRACTITIONER

## 2023-03-08 PROCEDURE — 36415 COLL VENOUS BLD VENIPUNCTURE: CPT | Performed by: NURSE PRACTITIONER

## 2023-03-08 PROCEDURE — 3079F DIAST BP 80-89 MM HG: CPT | Performed by: NURSE PRACTITIONER

## 2023-03-08 PROCEDURE — 3077F SYST BP >= 140 MM HG: CPT | Performed by: NURSE PRACTITIONER

## 2023-03-08 PROCEDURE — 84439 ASSAY OF FREE THYROXINE: CPT | Performed by: NURSE PRACTITIONER

## 2023-03-08 PROCEDURE — 84443 ASSAY THYROID STIM HORMONE: CPT | Performed by: NURSE PRACTITIONER

## 2023-03-08 PROCEDURE — 80053 COMPREHEN METABOLIC PANEL: CPT | Performed by: NURSE PRACTITIONER

## 2023-03-08 PROCEDURE — 99214 OFFICE O/P EST MOD 30 MIN: CPT | Performed by: NURSE PRACTITIONER

## 2023-03-08 RX ORDER — PRAMIPEXOLE DIHYDROCHLORIDE 0.25 MG/1
0.25 TABLET ORAL EVERY EVENING
COMMUNITY
Start: 2023-03-04

## 2023-03-08 NOTE — PROGRESS NOTES
"Chief Complaint  Follow-up and Restless Legs Syndrome, hypertension,     Subjective         Jeyson Rose presents to [facility]  HPI   Presents today for follow-up on hypertension, restless leg, subclinical hypothyroidism.  Blood pressure moderately elevated today.  Denies chest pain, CP, palpitations.  He took his medications right before coming to the office.  We will repeat the blood pressure in office it was fairly high.    KOKO Renteria has been prescribing his gabapentin and pramipexole for his restless leg.  Rarely takes it before bed.  He states his restless legs cause him to wake up frequently during the night in which she has to get up and walk around.    Social History     Socioeconomic History   • Marital status:    Tobacco Use   • Smoking status: Former     Packs/day: 1.50     Years: 30.00     Pack years: 45.00     Types: Cigarettes     Quit date:      Years since quittin.1   • Smokeless tobacco: Never   Vaping Use   • Vaping Use: Never used   Substance and Sexual Activity   • Alcohol use: Yes     Alcohol/week: 5.0 standard drinks     Types: 5 Cans of beer per week   • Drug use: Never   • Sexual activity: Defer        Objective     Vitals:    23 1303   BP: 142/84   Pulse: 65   Temp: 98.3 °F (36.8 °C)   SpO2: 99%   Weight: 97.4 kg (214 lb 12.8 oz)   Height: 172.7 cm (68\")        Body mass index is 32.66 kg/m².    Wt Readings from Last 3 Encounters:   23 97.4 kg (214 lb 12.8 oz)   23 96.2 kg (212 lb)   22 96.3 kg (212 lb 6.4 oz)       BP Readings from Last 3 Encounters:   23 142/84   23 130/78   22 162/84         Physical Exam  Vitals reviewed.   Constitutional:       Appearance: Normal appearance. He is well-developed.   HENT:      Head: Normocephalic and atraumatic.      Right Ear: External ear normal.      Left Ear: External ear normal.      Mouth/Throat:      Pharynx: No oropharyngeal exudate. "   Eyes:      Conjunctiva/sclera: Conjunctivae normal.      Pupils: Pupils are equal, round, and reactive to light.   Cardiovascular:      Rate and Rhythm: Normal rate and regular rhythm.      Heart sounds: No murmur heard.    No friction rub. No gallop.   Pulmonary:      Effort: Pulmonary effort is normal.      Breath sounds: Normal breath sounds. No wheezing or rhonchi.   Abdominal:      General: Bowel sounds are normal. There is no distension.      Palpations: Abdomen is soft.      Tenderness: There is no abdominal tenderness.   Skin:     General: Skin is warm and dry.   Neurological:      Mental Status: He is alert and oriented to person, place, and time.   Psychiatric:         Mood and Affect: Mood and affect normal.         Behavior: Behavior normal.         Thought Content: Thought content normal.         Judgment: Judgment normal.          Result Review :   The following data was reviewed by: NANCY Palacios on 03/08/2023:      Procedures    Assessment and Plan   Diagnoses and all orders for this visit:    1. Subclinical hypothyroidism (Primary)  -     TSH+Free T4    2. Essential (primary) hypertension  -     Comprehensive Metabolic Panel  -     CBC Auto Differential    3. Elevated blood sugar  -     Hemoglobin A1c      Struck to patient to monitor his blood pressure at home.  If blood pressure remains greater than 140/80 to follow back up in office.  He has a cardiologist appointment next month.  Check a hemoglobin A1c for elevated blood sugar.  Repeat TSH free T4 for subclinical hypothyroidism.    BMI is >= 30 and <35. (Class 1 Obesity). The following options were offered after discussion;: exercise counseling/recommendations and nutrition counseling/recommendations        Follow Up   Return in about 3 months (around 6/8/2023), or if symptoms worsen or fail to improve, for Next scheduled follow up.  Patient was given instructions and counseling regarding his condition or for health maintenance advice.  Please see specific information pulled into the AVS if appropriate.     Please note that portions of this note were completed with a voice recognition program.

## 2023-04-26 DIAGNOSIS — I10 ESSENTIAL (PRIMARY) HYPERTENSION: ICD-10-CM

## 2023-04-26 RX ORDER — AMLODIPINE BESYLATE 10 MG/1
10 TABLET ORAL DAILY
Qty: 90 TABLET | Refills: 1 | Status: SHIPPED | OUTPATIENT
Start: 2023-04-26

## 2023-06-05 DIAGNOSIS — I10 ESSENTIAL (PRIMARY) HYPERTENSION: ICD-10-CM

## 2023-06-05 RX ORDER — LOSARTAN POTASSIUM 100 MG/1
100 TABLET ORAL
Qty: 90 TABLET | Refills: 1 | Status: SHIPPED | OUTPATIENT
Start: 2023-06-05

## 2023-07-05 PROBLEM — Z12.11 SCREENING FOR MALIGNANT NEOPLASM OF COLON: Status: ACTIVE | Noted: 2023-07-05

## 2023-08-01 RX ORDER — TRIAMCINOLONE ACETONIDE 1 MG/G
CREAM TOPICAL
Qty: 45 G | Refills: 1 | Status: SHIPPED | OUTPATIENT
Start: 2023-08-01

## 2023-08-24 DIAGNOSIS — E03.8 SUBCLINICAL HYPOTHYROIDISM: ICD-10-CM

## 2023-08-24 DIAGNOSIS — I10 ESSENTIAL (PRIMARY) HYPERTENSION: ICD-10-CM

## 2023-08-25 ENCOUNTER — OFFICE VISIT (OUTPATIENT)
Dept: FAMILY MEDICINE CLINIC | Facility: CLINIC | Age: 63
End: 2023-08-25
Payer: MEDICARE

## 2023-08-25 VITALS
DIASTOLIC BLOOD PRESSURE: 78 MMHG | WEIGHT: 227.2 LBS | HEART RATE: 51 BPM | TEMPERATURE: 98.3 F | SYSTOLIC BLOOD PRESSURE: 136 MMHG | BODY MASS INDEX: 34.43 KG/M2 | OXYGEN SATURATION: 97 % | HEIGHT: 68 IN

## 2023-08-25 DIAGNOSIS — I25.10 CORONARY ARTERY DISEASE INVOLVING NATIVE CORONARY ARTERY OF NATIVE HEART WITHOUT ANGINA PECTORIS: ICD-10-CM

## 2023-08-25 DIAGNOSIS — E55.9 VITAMIN D DEFICIENCY: ICD-10-CM

## 2023-08-25 DIAGNOSIS — E03.8 SUBCLINICAL HYPOTHYROIDISM: ICD-10-CM

## 2023-08-25 DIAGNOSIS — I10 ESSENTIAL (PRIMARY) HYPERTENSION: ICD-10-CM

## 2023-08-25 DIAGNOSIS — R79.89 ELEVATED SERUM CREATININE: ICD-10-CM

## 2023-08-25 DIAGNOSIS — Z87.891 FORMER SMOKER: Primary | ICD-10-CM

## 2023-08-25 LAB
25(OH)D3 SERPL-MCNC: 28.4 NG/ML (ref 30–100)
ALBUMIN SERPL-MCNC: 4.3 G/DL (ref 3.5–5.2)
ALBUMIN/GLOB SERPL: 1.5 G/DL
ALP SERPL-CCNC: 70 U/L (ref 39–117)
ALT SERPL W P-5'-P-CCNC: 19 U/L (ref 1–41)
ANION GAP SERPL CALCULATED.3IONS-SCNC: 11 MMOL/L (ref 5–15)
AST SERPL-CCNC: 18 U/L (ref 1–40)
BASOPHILS # BLD AUTO: 0.06 10*3/MM3 (ref 0–0.2)
BASOPHILS NFR BLD AUTO: 1.1 % (ref 0–1.5)
BILIRUB SERPL-MCNC: 0.5 MG/DL (ref 0–1.2)
BUN SERPL-MCNC: 25 MG/DL (ref 8–23)
BUN/CREAT SERPL: 18.1 (ref 7–25)
CALCIUM SPEC-SCNC: 9.3 MG/DL (ref 8.6–10.5)
CHLORIDE SERPL-SCNC: 103 MMOL/L (ref 98–107)
CO2 SERPL-SCNC: 26 MMOL/L (ref 22–29)
CREAT SERPL-MCNC: 1.38 MG/DL (ref 0.76–1.27)
DEPRECATED RDW RBC AUTO: 44.1 FL (ref 37–54)
EGFRCR SERPLBLD CKD-EPI 2021: 56.7 ML/MIN/1.73
EOSINOPHIL # BLD AUTO: 0.17 10*3/MM3 (ref 0–0.4)
EOSINOPHIL NFR BLD AUTO: 3.1 % (ref 0.3–6.2)
ERYTHROCYTE [DISTWIDTH] IN BLOOD BY AUTOMATED COUNT: 13.2 % (ref 12.3–15.4)
GLOBULIN UR ELPH-MCNC: 2.8 GM/DL
GLUCOSE SERPL-MCNC: 111 MG/DL (ref 65–99)
HCT VFR BLD AUTO: 44 % (ref 37.5–51)
HGB BLD-MCNC: 14.9 G/DL (ref 13–17.7)
IMM GRANULOCYTES # BLD AUTO: 0.01 10*3/MM3 (ref 0–0.05)
IMM GRANULOCYTES NFR BLD AUTO: 0.2 % (ref 0–0.5)
LYMPHOCYTES # BLD AUTO: 1 10*3/MM3 (ref 0.7–3.1)
LYMPHOCYTES NFR BLD AUTO: 18.5 % (ref 19.6–45.3)
MCH RBC QN AUTO: 31 PG (ref 26.6–33)
MCHC RBC AUTO-ENTMCNC: 33.9 G/DL (ref 31.5–35.7)
MCV RBC AUTO: 91.5 FL (ref 79–97)
MONOCYTES # BLD AUTO: 0.6 10*3/MM3 (ref 0.1–0.9)
MONOCYTES NFR BLD AUTO: 11.1 % (ref 5–12)
NEUTROPHILS NFR BLD AUTO: 3.58 10*3/MM3 (ref 1.7–7)
NEUTROPHILS NFR BLD AUTO: 66 % (ref 42.7–76)
NRBC BLD AUTO-RTO: 0 /100 WBC (ref 0–0.2)
PLATELET # BLD AUTO: 211 10*3/MM3 (ref 140–450)
PMV BLD AUTO: 9.8 FL (ref 6–12)
POTASSIUM SERPL-SCNC: 4.2 MMOL/L (ref 3.5–5.2)
PROT SERPL-MCNC: 7.1 G/DL (ref 6–8.5)
RBC # BLD AUTO: 4.81 10*6/MM3 (ref 4.14–5.8)
SODIUM SERPL-SCNC: 140 MMOL/L (ref 136–145)
T4 FREE SERPL-MCNC: 1.12 NG/DL (ref 0.93–1.7)
TSH SERPL DL<=0.05 MIU/L-ACNC: 3.68 UIU/ML (ref 0.27–4.2)
WBC NRBC COR # BLD: 5.42 10*3/MM3 (ref 3.4–10.8)

## 2023-08-25 PROCEDURE — 36415 COLL VENOUS BLD VENIPUNCTURE: CPT | Performed by: NURSE PRACTITIONER

## 2023-08-25 PROCEDURE — 84443 ASSAY THYROID STIM HORMONE: CPT | Performed by: NURSE PRACTITIONER

## 2023-08-25 PROCEDURE — 82306 VITAMIN D 25 HYDROXY: CPT | Performed by: NURSE PRACTITIONER

## 2023-08-25 PROCEDURE — 99214 OFFICE O/P EST MOD 30 MIN: CPT | Performed by: NURSE PRACTITIONER

## 2023-08-25 PROCEDURE — 3075F SYST BP GE 130 - 139MM HG: CPT | Performed by: NURSE PRACTITIONER

## 2023-08-25 PROCEDURE — 84439 ASSAY OF FREE THYROXINE: CPT | Performed by: NURSE PRACTITIONER

## 2023-08-25 PROCEDURE — 80053 COMPREHEN METABOLIC PANEL: CPT | Performed by: NURSE PRACTITIONER

## 2023-08-25 PROCEDURE — 3078F DIAST BP <80 MM HG: CPT | Performed by: NURSE PRACTITIONER

## 2023-08-25 PROCEDURE — 85025 COMPLETE CBC W/AUTO DIFF WBC: CPT | Performed by: NURSE PRACTITIONER

## 2023-08-25 RX ORDER — LEVOTHYROXINE SODIUM 0.05 MG/1
50 TABLET ORAL DAILY
Qty: 90 TABLET | Refills: 1 | Status: SHIPPED | OUTPATIENT
Start: 2023-08-25

## 2023-08-25 RX ORDER — CARVEDILOL 12.5 MG/1
TABLET ORAL
Qty: 90 TABLET | Refills: 1 | Status: SHIPPED | OUTPATIENT
Start: 2023-08-25

## 2023-08-25 NOTE — PROGRESS NOTES
"Chief Complaint  Follow-up (Discuss ZHAO), hypertension, hypothyroidism    Subjective         Jeyson Rose presents to [facility]  HPI   Presents today for follow-up on hypertension, hypothyroidism.  Overall patient is doing well.  Blood pressures fairly controlled.  Denies chest pain, significant palpitations.  He has restless leg in which she takes amitriptyline and gabapentin.  Acid reflux has improved.  He has stopped taking and PPI.  History of squamous cell carcinoma of the head and neck.  Recently had a biopsy where there is a tooth fragment that was left.    Social History     Socioeconomic History    Marital status:    Tobacco Use    Smoking status: Former     Packs/day: 1.50     Years: 30.00     Pack years: 45.00     Types: Cigarettes     Quit date:      Years since quittin.6    Smokeless tobacco: Never   Vaping Use    Vaping Use: Never used   Substance and Sexual Activity    Alcohol use: Yes     Alcohol/week: 5.0 standard drinks     Types: 5 Cans of beer per week    Drug use: Never    Sexual activity: Defer        Objective     Vitals:    23 0909   BP: 136/78   BP Location: Right arm   Patient Position: Sitting   Cuff Size: Adult   Pulse: 51   Temp: 98.3 øF (36.8 øC)   TempSrc: Oral   SpO2: 97%   Weight: 103 kg (227 lb 3.2 oz)   Height: 172.7 cm (68\")        Body mass index is 34.55 kg/mý.    Wt Readings from Last 3 Encounters:   23 103 kg (227 lb 3.2 oz)   23 102 kg (224 lb)   23 97.4 kg (214 lb 12.8 oz)       BP Readings from Last 3 Encounters:   23 136/78   23 142/84   23 130/78         Physical Exam  Vitals reviewed.   Constitutional:       Appearance: Normal appearance. He is well-developed.   HENT:      Head: Normocephalic and atraumatic.      Right Ear: External ear normal.      Left Ear: External ear normal.      Mouth/Throat:      Pharynx: No oropharyngeal exudate.   Eyes:      Conjunctiva/sclera: " Conjunctivae normal.      Pupils: Pupils are equal, round, and reactive to light.   Cardiovascular:      Rate and Rhythm: Normal rate and regular rhythm.      Heart sounds: No murmur heard.    No friction rub. No gallop.   Pulmonary:      Effort: Pulmonary effort is normal.      Breath sounds: Normal breath sounds. No wheezing or rhonchi.   Abdominal:      General: Bowel sounds are normal. There is no distension.      Palpations: Abdomen is soft.      Tenderness: There is no abdominal tenderness.   Skin:     General: Skin is warm and dry.   Neurological:      Mental Status: He is alert and oriented to person, place, and time.   Psychiatric:         Mood and Affect: Mood and affect normal.         Behavior: Behavior normal.         Thought Content: Thought content normal.         Judgment: Judgment normal.        Result Review :   The following data was reviewed by: NANCY Palacios on 08/25/2023:      Procedures    Assessment and Plan   Diagnoses and all orders for this visit:    1. Former smoker (Primary)  -      CT Chest Low Dose Cancer Screening WO; Future    2. Essential (primary) hypertension  -     Comprehensive Metabolic Panel  -     CBC Auto Differential    3. Coronary artery disease involving native coronary artery of native heart without angina pectoris  -     Comprehensive Metabolic Panel  -     CBC Auto Differential    4. Elevated serum creatinine  -     Comprehensive Metabolic Panel    5. Subclinical hypothyroidism  -     TSH+Free T4    6. Vitamin D deficiency  -     Vitamin D,25-Hydroxy    We will order a CT scan for after November 20 for follow-up on cancer screening.  Continue monitoring blood pressure at home.  Check CBC MP.  He did have an elevated creatinine level.  We will check the CMP.  Continue the aspirin.  Thyroid levels have been within normal limits.  We will check a T 4 and a TSH check vitamin D.  History of vitamin D deficiency.               Follow Up   No follow-ups on  file.  Patient was given instructions and counseling regarding his condition or for health maintenance advice. Please see specific information pulled into the AVS if appropriate.     Please note that portions of this note were completed with a voice recognition program.

## 2023-08-30 RX ORDER — ERGOCALCIFEROL 1.25 MG/1
50000 CAPSULE ORAL WEEKLY
Qty: 13 CAPSULE | Refills: 1 | Status: SHIPPED | OUTPATIENT
Start: 2023-08-30

## 2023-09-15 ENCOUNTER — TELEPHONE (OUTPATIENT)
Dept: FAMILY MEDICINE CLINIC | Facility: CLINIC | Age: 63
End: 2023-09-15
Payer: MEDICARE

## 2023-09-15 NOTE — TELEPHONE ENCOUNTER
Please confirm which medication is needed.  I am unfamiliar with this patient as she is a former [facility] patient.  I do see from [facility] reporting that gabapentin was just prescribed on 8/3/2023 by Dr. Toño Ferris and patient was given a 90-day supply so gabapentin is not needed for refill.  Please have patient or spouse determine what medication they need sent

## 2023-09-15 NOTE — TELEPHONE ENCOUNTER
Caller: SONA     Relationship: [facility]     Best call back number: [phone]     Requested Prescriptions:        Pharmacy where request should be sent: [facility]    Additional details provided by PHARMACY:  Wife called asking for refill of medication for restless leg syndrome. She wasn't sure which medication he needed to get refill.  Please call patient and verify if it is the Gabapentin or not.      Does the patient have less than a 3 day supply:  [x] Yes  [] No     Would you like a call back once the refill request has been completed: [x] Yes [] No     If the office needs to give you a call back, can they leave a voicemail: [x] Yes [] No

## 2023-09-18 NOTE — TELEPHONE ENCOUNTER
Patient will need to be seen for an appointment.  I am not sure what the circumstances here.  Please refer to my prior note as he was just given a prescription for gabapentin which is very similar to Lyrica on 8/3/2020.  By Dr. Toño Ferris.

## 2023-09-18 NOTE — TELEPHONE ENCOUNTER
Called patient and he stated that he wanted pregablin refilled. Its not on his current list of meds but it is on past meds.Please advise.

## 2023-10-12 ENCOUNTER — TRANSCRIBE ORDERS (OUTPATIENT)
Dept: ADMINISTRATIVE | Facility: HOSPITAL | Age: 63
End: 2023-10-12
Payer: MEDICARE

## 2023-10-12 DIAGNOSIS — N18.30 STAGE 3 CHRONIC KIDNEY DISEASE, UNSPECIFIED WHETHER STAGE 3A OR 3B CKD: Primary | ICD-10-CM

## 2023-10-16 ENCOUNTER — TELEPHONE (OUTPATIENT)
Dept: FAMILY MEDICINE CLINIC | Facility: CLINIC | Age: 63
End: 2023-10-16
Payer: MEDICARE

## 2023-10-16 ENCOUNTER — CLINICAL SUPPORT (OUTPATIENT)
Dept: FAMILY MEDICINE CLINIC | Facility: CLINIC | Age: 63
End: 2023-10-16
Payer: MEDICARE

## 2023-10-16 DIAGNOSIS — N18.30 STAGE 3 CHRONIC KIDNEY DISEASE, UNSPECIFIED WHETHER STAGE 3A OR 3B CKD: ICD-10-CM

## 2023-10-16 LAB
ANION GAP SERPL CALCULATED.3IONS-SCNC: 8.8 MMOL/L (ref 5–15)
BACTERIA UR QL AUTO: NORMAL /HPF
BASOPHILS # BLD AUTO: 0.05 10*3/MM3 (ref 0–0.2)
BASOPHILS NFR BLD AUTO: 0.6 % (ref 0–1.5)
BILIRUB UR QL STRIP: NEGATIVE
BUN SERPL-MCNC: 18 MG/DL (ref 8–23)
BUN/CREAT SERPL: 13.1 (ref 7–25)
CALCIUM SPEC-SCNC: 9.5 MG/DL (ref 8.6–10.5)
CHLORIDE SERPL-SCNC: 101 MMOL/L (ref 98–107)
CLARITY UR: CLEAR
CO2 SERPL-SCNC: 29.2 MMOL/L (ref 22–29)
COLOR UR: YELLOW
CREAT SERPL-MCNC: 1.37 MG/DL (ref 0.76–1.27)
DEPRECATED RDW RBC AUTO: 43.8 FL (ref 37–54)
EGFRCR SERPLBLD CKD-EPI 2021: 57.2 ML/MIN/1.73
EOSINOPHIL # BLD AUTO: 0.14 10*3/MM3 (ref 0–0.4)
EOSINOPHIL NFR BLD AUTO: 1.7 % (ref 0.3–6.2)
ERYTHROCYTE [DISTWIDTH] IN BLOOD BY AUTOMATED COUNT: 13.1 % (ref 12.3–15.4)
GLUCOSE SERPL-MCNC: 109 MG/DL (ref 65–99)
GLUCOSE UR STRIP-MCNC: NEGATIVE MG/DL
HCT VFR BLD AUTO: 45 % (ref 37.5–51)
HGB BLD-MCNC: 15.2 G/DL (ref 13–17.7)
HGB UR QL STRIP.AUTO: NEGATIVE
HYALINE CASTS UR QL AUTO: NORMAL /LPF
IMM GRANULOCYTES # BLD AUTO: 0.04 10*3/MM3 (ref 0–0.05)
IMM GRANULOCYTES NFR BLD AUTO: 0.5 % (ref 0–0.5)
KETONES UR QL STRIP: NEGATIVE
LEUKOCYTE ESTERASE UR QL STRIP.AUTO: NEGATIVE
LYMPHOCYTES # BLD AUTO: 1.22 10*3/MM3 (ref 0.7–3.1)
LYMPHOCYTES NFR BLD AUTO: 15.2 % (ref 19.6–45.3)
MCH RBC QN AUTO: 30.8 PG (ref 26.6–33)
MCHC RBC AUTO-ENTMCNC: 33.8 G/DL (ref 31.5–35.7)
MCV RBC AUTO: 91.3 FL (ref 79–97)
MONOCYTES # BLD AUTO: 0.68 10*3/MM3 (ref 0.1–0.9)
MONOCYTES NFR BLD AUTO: 8.4 % (ref 5–12)
NEUTROPHILS NFR BLD AUTO: 5.92 10*3/MM3 (ref 1.7–7)
NEUTROPHILS NFR BLD AUTO: 73.6 % (ref 42.7–76)
NITRITE UR QL STRIP: NEGATIVE
NRBC BLD AUTO-RTO: 0 /100 WBC (ref 0–0.2)
PH UR STRIP.AUTO: 6.5 [PH] (ref 5–8)
PHOSPHATE SERPL-MCNC: 2.5 MG/DL (ref 2.5–4.5)
PLATELET # BLD AUTO: 213 10*3/MM3 (ref 140–450)
PMV BLD AUTO: 10.3 FL (ref 6–12)
POTASSIUM SERPL-SCNC: 3.8 MMOL/L (ref 3.5–5.2)
PROT ?TM UR-MCNC: 36.6 MG/DL
PROT UR QL STRIP: ABNORMAL
PTH-INTACT SERPL-MCNC: 68.8 PG/ML (ref 15–65)
RBC # BLD AUTO: 4.93 10*6/MM3 (ref 4.14–5.8)
RBC # UR STRIP: NORMAL /HPF
REF LAB TEST METHOD: NORMAL
SODIUM SERPL-SCNC: 139 MMOL/L (ref 136–145)
SP GR UR STRIP: 1.02 (ref 1–1.03)
SQUAMOUS #/AREA URNS HPF: NORMAL /HPF
UROBILINOGEN UR QL STRIP: ABNORMAL
WBC # UR STRIP: NORMAL /HPF
WBC NRBC COR # BLD: 8.05 10*3/MM3 (ref 3.4–10.8)

## 2023-10-16 PROCEDURE — 85025 COMPLETE CBC W/AUTO DIFF WBC: CPT | Performed by: INTERNAL MEDICINE

## 2023-10-16 PROCEDURE — 84100 ASSAY OF PHOSPHORUS: CPT | Performed by: INTERNAL MEDICINE

## 2023-10-16 PROCEDURE — 36415 COLL VENOUS BLD VENIPUNCTURE: CPT | Performed by: FAMILY MEDICINE

## 2023-10-16 PROCEDURE — 83970 ASSAY OF PARATHORMONE: CPT | Performed by: INTERNAL MEDICINE

## 2023-10-16 PROCEDURE — 84156 ASSAY OF PROTEIN URINE: CPT | Performed by: INTERNAL MEDICINE

## 2023-10-16 PROCEDURE — 80048 BASIC METABOLIC PNL TOTAL CA: CPT | Performed by: INTERNAL MEDICINE

## 2023-10-16 PROCEDURE — 81001 URINALYSIS AUTO W/SCOPE: CPT | Performed by: INTERNAL MEDICINE

## 2023-10-16 NOTE — TELEPHONE ENCOUNTER
"  Caller: Jeyson Rose \"LOU\"    Relationship: Self    Best call back number: [phone]     What is the best time to reach you: ASAP    Who are you requesting to speak with (clinical staff, provider,  specific staff member): CLINICAL    What was the call regarding: PATIENT STATES THAT ANOTHER ONE OF HIS DOCTORS IS SUPPOSED TO BE FAXING OVER ORDERS FOR HIM TO HAVE BLOODWORK DONE AT THE OFFICE. HE STATES THAT IT SHOULD HAVE BEEN FAXED THIS MORNING AROUND 10 AND HE WOULD LIKE TO KNOW IF THE OFFICE HAS RECEIVED THOSE ORDERS.    Is it okay if the provider responds through MyChart: NO  "

## 2023-10-16 NOTE — TELEPHONE ENCOUNTER
Patient notified via phone call we do have orders from SHARON DICKERSON transcribed into the system he stated those are the correct orders and he will come into office shortly  to have them drawn

## 2023-11-02 ENCOUNTER — TELEPHONE (OUTPATIENT)
Dept: SURGERY | Facility: CLINIC | Age: 63
End: 2023-11-02
Payer: MEDICARE

## 2023-11-03 ENCOUNTER — TELEPHONE (OUTPATIENT)
Dept: SURGERY | Facility: CLINIC | Age: 63
End: 2023-11-03
Payer: MEDICARE

## 2023-11-03 NOTE — TELEPHONE ENCOUNTER
Patient called back.  He r/s his scope to 3-21-24 with a 8am arrival time. I spoke with Rosalinda in surgery scheduling. I will mail him his bowel prep instructions, I verified his address.

## 2023-11-03 NOTE — TELEPHONE ENCOUNTER
Hub staff attempted to follow warm transfer process and was unsuccessful     Caller: INDIRA SPIVEY    Relationship to patient: SELF    Best call back number: [phone]      Patient is needing: RETURNING MISSED CALL FROM OFFICE

## 2023-11-20 DIAGNOSIS — I10 ESSENTIAL (PRIMARY) HYPERTENSION: ICD-10-CM

## 2023-11-20 RX ORDER — CARVEDILOL 12.5 MG/1
12.5 TABLET ORAL 2 TIMES DAILY WITH MEALS
Qty: 90 TABLET | Refills: 0 | Status: SHIPPED | OUTPATIENT
Start: 2023-11-20

## 2023-11-20 RX ORDER — LOSARTAN POTASSIUM 100 MG/1
100 TABLET ORAL
Qty: 90 TABLET | Refills: 0 | Status: SHIPPED | OUTPATIENT
Start: 2023-11-20

## 2023-12-01 ENCOUNTER — CLINICAL SUPPORT (OUTPATIENT)
Dept: FAMILY MEDICINE CLINIC | Facility: CLINIC | Age: 63
End: 2023-12-01
Payer: MEDICARE

## 2023-12-01 DIAGNOSIS — Z23 NEED FOR INFLUENZA VACCINATION: Primary | ICD-10-CM

## 2023-12-07 DIAGNOSIS — I10 ESSENTIAL (PRIMARY) HYPERTENSION: ICD-10-CM

## 2023-12-07 RX ORDER — AMLODIPINE BESYLATE 10 MG/1
10 TABLET ORAL DAILY
Qty: 90 TABLET | Refills: 1 | Status: SHIPPED | OUTPATIENT
Start: 2023-12-07

## 2023-12-07 NOTE — TELEPHONE ENCOUNTER
"     Caller: Jeyson Rose \"LOU\"    Relationship: Self    Best call back number: [phone]    Requested Prescriptions:   Requested Prescriptions     Pending Prescriptions Disp Refills    amLODIPine (NORVASC) 10 MG tablet 90 tablet 1     Sig: Take 1 tablet by mouth Daily.        Pharmacy where request should be sent: [id number] [address] AT [facility] - [phone]  - [phone]      Last office visit with prescribing clinician: Visit date not found   Last telemedicine visit with prescribing clinician: Visit date not found   Next office visit with prescribing clinician: 2/26/2024     Additional details provided by patient:      Does the patient have less than a 3 day supply:  [] Yes  [x] No    Would you like a call back once the refill request has been completed: [] Yes [x] No    If the office needs to give you a call back, can they leave a voicemail: [] Yes [x] No    Miguel Cruz Rep   12/07/23 09:25 EST        "

## 2024-01-15 DIAGNOSIS — I10 ESSENTIAL (PRIMARY) HYPERTENSION: ICD-10-CM

## 2024-01-15 RX ORDER — CARVEDILOL 12.5 MG/1
12.5 TABLET ORAL 2 TIMES DAILY WITH MEALS
Qty: 180 TABLET | Refills: 3 | Status: SHIPPED | OUTPATIENT
Start: 2024-01-15

## 2024-02-19 DIAGNOSIS — I10 ESSENTIAL (PRIMARY) HYPERTENSION: ICD-10-CM

## 2024-02-19 RX ORDER — LOSARTAN POTASSIUM 100 MG/1
100 TABLET ORAL
Qty: 90 TABLET | Refills: 3 | Status: SHIPPED | OUTPATIENT
Start: 2024-02-19

## 2024-02-20 DIAGNOSIS — E03.8 SUBCLINICAL HYPOTHYROIDISM: ICD-10-CM

## 2024-02-20 RX ORDER — LEVOTHYROXINE SODIUM 0.05 MG/1
50 TABLET ORAL DAILY
Qty: 90 TABLET | Refills: 3 | Status: SHIPPED | OUTPATIENT
Start: 2024-02-20

## 2024-02-26 ENCOUNTER — TELEPHONE (OUTPATIENT)
Dept: FAMILY MEDICINE CLINIC | Facility: CLINIC | Age: 64
End: 2024-02-26

## 2024-02-26 NOTE — TELEPHONE ENCOUNTER
Caller: [facility]    Relationship:     Best call back number: [phone]       What was the call regarding: REPORTS PATIENT WAS DUE FOR LUNG CANCER SCREENING IN NOVEMBER 2023

## 2024-02-27 NOTE — TELEPHONE ENCOUNTER
I have not seen this patient before.  It looks like he missed an appointment yesterday.  I do not know what criteria he would need for low-dose chest CT.  Please contact him and find out if he is still smoking and if so how many packs per day and for how many years.  If not 1 that he quit smoking and again how many packs per day for how many years.  Please make him a follow-up appointment as he really needs to have an appointment.  In the meantime I can order the chest CT for him but I will need that information.

## 2024-02-29 ENCOUNTER — TELEPHONE (OUTPATIENT)
Dept: FAMILY MEDICINE CLINIC | Facility: CLINIC | Age: 64
End: 2024-02-29
Payer: MEDICARE

## 2024-02-29 NOTE — TELEPHONE ENCOUNTER
CALLED PT TO ASK ABOUT LOW DOSE CT AND THE SMOKING QUESTIONS FOR HIM TO QUALIFY FOR THE CT. HE SMOKED FOR 27 YEARS. STARTED AT AGE 28 AND STOPPED IN 2016. HE SMOKED 1 PACK A DAY.   PT IS UNSURE IF INSURANCE [employer] PPO WILL COVER THE CT. PT WILL CALL INSURANCE AND GET BACK WITH US. DID MAKE AN APPOINTMENT FOR HIM TO SEE

## 2024-03-15 NOTE — PRE-PROCEDURE INSTRUCTIONS
"PAT call attempted.  No answer.  Detailed message with date and arrival time of 0800 given.  Come to entrance \"C\"; must have adult  for transportation home; may have two visitors; however, children under 12 must remain in waiting area; instructed on diet/clear liquids/NPO/bowel prep, if needed; may take normal meds two hours prior to arrival time except for blood thinners, antidiabetics, diuretics, and weight loss meds.  Instructed to return call to confirm receipt of instructions and for any questions.  "

## 2024-03-19 ENCOUNTER — ANESTHESIA EVENT (OUTPATIENT)
Dept: GASTROENTEROLOGY | Facility: HOSPITAL | Age: 64
End: 2024-03-19
Payer: MEDICARE

## 2024-03-19 NOTE — ANESTHESIA PREPROCEDURE EVALUATION
Anesthesia Evaluation     Patient summary reviewed and Nursing notes reviewed   history of anesthetic complications:  PONV  NPO Solid Status: > 8 hours  NPO Liquid Status: > 2 hours           Airway   Mallampati: I  TM distance: >3 FB  Neck ROM: full  No difficulty expected  Comment: Hx of cancer with radiation, despite this - good mouth opening and MP1. No difficulty expected  Dental    (+) poor dentition        Pulmonary - normal exam    breath sounds clear to auscultation  Cardiovascular - normal exam    ECG reviewed  Rhythm: regular  Rate: normal    (+) hypertension 2 medications or greater, past MI  >12 months, CAD, dysrhythmias Atrial Fib      Neuro/Psych  (+) weakness, numbness  GI/Hepatic/Renal/Endo    (+) GERD well controlled, renal disease-, thyroid problem hypothyroidism    Musculoskeletal         ROS comment: Radiation to neck  Abdominal  - normal exam   Substance History      OB/GYN          Other   arthritis,   history of cancer (jaw cancer, tonsillar cancer, lymphoma)    ROS/Med Hx Other:  OTHERWISE NORMAL ECG -  Sinus bradycardia  When compared with ECG of 12-Mar-2021 7:25:39,  Significant rate decrease  Significant axis, voltage or hypertrophy change  Electronically Signed By: Guzman Weiss ([facility]) 25-Aug-2022 13:36:00  Date and Time of Study: 2022-08-15 15:50:36    Sees Cardiology annually. Had MI in 2016 after being non-compliant with HTN meds at that time.                Anesthesia Plan    ASA 3     general     (Total IV Anesthesia    Patient understands anesthesia not responsible for dental damage.  )  intravenous induction     Anesthetic plan, risks, benefits, and alternatives have been provided, discussed and informed consent has been obtained with: patient and spouse/significant other.  Pre-procedure education provided  Plan discussed with CRNA.      CODE STATUS:

## 2024-03-21 ENCOUNTER — ANESTHESIA (OUTPATIENT)
Dept: GASTROENTEROLOGY | Facility: HOSPITAL | Age: 64
End: 2024-03-21
Payer: MEDICARE

## 2024-03-21 ENCOUNTER — HOSPITAL ENCOUNTER (OUTPATIENT)
Facility: HOSPITAL | Age: 64
Setting detail: HOSPITAL OUTPATIENT SURGERY
Discharge: HOME | End: 2024-03-21
Attending: SURGERY | Admitting: SURGERY
Payer: MEDICARE

## 2024-03-21 VITALS
BODY MASS INDEX: 33.95 KG/M2 | OXYGEN SATURATION: 95 % | HEART RATE: 78 BPM | SYSTOLIC BLOOD PRESSURE: 117 MMHG | WEIGHT: 223.99 LBS | RESPIRATION RATE: 18 BRPM | TEMPERATURE: 97.2 F | DIASTOLIC BLOOD PRESSURE: 99 MMHG | HEIGHT: 68 IN

## 2024-03-21 DIAGNOSIS — Z12.11 SCREENING FOR MALIGNANT NEOPLASM OF COLON: ICD-10-CM

## 2024-03-21 PROCEDURE — 25810000003 LACTATED RINGERS PER 1000 ML

## 2024-03-21 PROCEDURE — 25010000002 PROPOFOL 10 MG/ML EMULSION: Performed by: NURSE ANESTHETIST, CERTIFIED REGISTERED

## 2024-03-21 PROCEDURE — 88305 TISSUE EXAM BY PATHOLOGIST: CPT | Performed by: SURGERY

## 2024-03-21 RX ORDER — LIDOCAINE HYDROCHLORIDE 20 MG/ML
INJECTION, SOLUTION EPIDURAL; INFILTRATION; INTRACAUDAL; PERINEURAL AS NEEDED
Status: DISCONTINUED | OUTPATIENT
Start: 2024-03-21 | End: 2024-03-21 | Stop reason: SURG

## 2024-03-21 RX ORDER — PROPOFOL 10 MG/ML
VIAL (ML) INTRAVENOUS AS NEEDED
Status: DISCONTINUED | OUTPATIENT
Start: 2024-03-21 | End: 2024-03-21 | Stop reason: SURG

## 2024-03-21 RX ORDER — SODIUM CHLORIDE, SODIUM LACTATE, POTASSIUM CHLORIDE, CALCIUM CHLORIDE 600; 310; 30; 20 MG/100ML; MG/100ML; MG/100ML; MG/100ML
30 INJECTION, SOLUTION INTRAVENOUS CONTINUOUS
Status: DISCONTINUED | OUTPATIENT
Start: 2024-03-21 | End: 2024-03-21 | Stop reason: HOSPADM

## 2024-03-21 RX ADMIN — LIDOCAINE HYDROCHLORIDE 100 MG: 20 INJECTION, SOLUTION EPIDURAL; INFILTRATION; INTRACAUDAL; PERINEURAL at 09:28

## 2024-03-21 RX ADMIN — PROPOFOL 200 MCG/KG/MIN: 10 INJECTION, EMULSION INTRAVENOUS at 09:28

## 2024-03-21 RX ADMIN — PROPOFOL 50 MG: 10 INJECTION, EMULSION INTRAVENOUS at 09:28

## 2024-03-21 RX ADMIN — SODIUM CHLORIDE, POTASSIUM CHLORIDE, SODIUM LACTATE AND CALCIUM CHLORIDE 30 ML/HR: 600; 310; 30; 20 INJECTION, SOLUTION INTRAVENOUS at 09:03

## 2024-03-21 NOTE — ANESTHESIA POSTPROCEDURE EVALUATION
Patient: Jeyson Rose    Procedure Summary       Date: 03/21/24 Room / Location: [facility] / [facility]    Anesthesia Start: 0925 Anesthesia Stop: 0944    Procedure: COLONOSCOPY with hot snare polypectomy Diagnosis:       Screening for malignant neoplasm of colon      (Screening for malignant neoplasm of colon [Z12.11])    Surgeons: Sudeep Sutherland MD Provider: Justus Bashir CRNA    Anesthesia Type: general ASA Status: 3            Anesthesia Type: general    Vitals  Vitals Value Taken Time   /91 03/21/24 0954   Temp 36.2 °C (97.2 °F) 03/21/24 0943   Pulse 86 03/21/24 0957   Resp 18 03/21/24 0953   SpO2 95 % 03/21/24 0957   Vitals shown include unfiled device data.        Post Anesthesia Care and Evaluation    Post-procedure mental status: acceptable.  Pain management: satisfactory to patient    Airway patency: patent  Anesthetic complications: No anesthetic complications    Cardiovascular status: acceptable  Respiratory status: acceptable    Comments: Per chart review

## 2024-03-21 NOTE — H&P
"General Surgery/Colorectal Surgery Note    Patient Name:  Jeyson Rose  YOB: 1960  [id number]    Referring Provider: Sudeep Sutherland, *      Patient Care Team:  Toño Valdovinos DO as PCP - General (Family Medicine)  Houston Martini MD as Consulting Physician (Cardiac Electrophysiology)    Subjective .     History of present illness:    HPI   referral from [facility] for colonoscopy consultation. Patient denies any abdominal pain, change in bowel habit, or rectal bleeding. Denies any family history of colorectal cancer.     History:  Past Medical History:   Diagnosis Date    Acid reflux disease     Arthritis     Cancer     in remission    Forgetfulness     Heart attack 2016    High blood pressure     History of cancer chemotherapy     History of irregular heartbeat     A-FIB, only 1 time d/t \"not taking b/p meds\"    History of radiation therapy     RIGHT NECK    Hypertension     Jaw cancer 04/2022    Left knee pain 08/2022    Lymphoma     Pain, knee     BILAT    Pedal edema     PONV (postoperative nausea and vomiting)     Tonsillar cancer     RIGHT, STAGE IV    Unsteady gait     D/T KNEES BILAT    Weakness     KNEES BILAT       Past Surgical History:   Procedure Laterality Date    CARDIAC CATHETERIZATION      COLONOSCOPY  2018    normal per patient    EYE SURGERY      HERNIA REPAIR Bilateral     JOINT REPLACEMENT      NECK SURGERY Right     TONSILS, MASS, LYMPH NODE 2016 SQUAMOS CELL CARCINOMA    OTHER SURGICAL HISTORY      for jaw cancer    TONSILLECTOMY  2016    cancer    TOTAL KNEE ARTHROPLASTY Right 03/26/2021    Procedure: TOTAL KNEE ARTHROPLASTY;  Surgeon: Song Marsh MD;  Location: [facility] OR;  Service: Orthopedics;  Laterality: Right;    TOTAL KNEE ARTHROPLASTY Left 8/23/2022    Procedure: Left total knee arthroplasty with robotics;  Surgeon: Song Marsh MD;  Location: [facility] OR;  Service: Robotics - Ortho;  Laterality: Left;       Family History "   Problem Relation Age of Onset    Heart attack Maternal Grandfather     Orestes Hyperthermia Neg Hx        Social History     Tobacco Use    Smoking status: Former     Current packs/day: 0.00     Average packs/day: 1.5 packs/day for 30.0 years (45.0 ttl pk-yrs)     Types: Cigarettes     Start date:      Quit date: 2016     Years since quittin.2    Smokeless tobacco: Never   Vaping Use    Vaping status: Never Used   Substance Use Topics    Alcohol use: Yes     Alcohol/week: 5.0 standard drinks of alcohol     Types: 5 Cans of beer per week    Drug use: Never       Review of Systems: See HPI    Review of Systems            Medications Prior to Admission   Medication Sig Dispense Refill Last Dose    albuterol sulfate  (90 Base) MCG/ACT inhaler Inhale 2 puffs Every 4 (Four) Hours As Needed for Wheezing. (Patient not taking: Reported on 2023) 8 g 0     amitriptyline (ELAVIL) 25 MG tablet Take 2 tablets by mouth Every Night.       amLODIPine (NORVASC) 10 MG tablet Take 1 tablet by mouth Daily. 90 tablet 1     aspirin 81 MG EC tablet Take 1 tablet by mouth Daily.       carvedilol (COREG) 12.5 MG tablet TAKE 1 TABLET BY MOUTH TWICE DAILY WITH MEALS 180 tablet 3     gabapentin (NEURONTIN) 300 MG capsule Take 1 capsule by mouth 2 (Two) Times a Day.       levothyroxine (SYNTHROID, LEVOTHROID) 50 MCG tablet Take 1 tablet by mouth Daily. 90 tablet 3     losartan (COZAAR) 100 MG tablet Take 1 tablet by mouth Every Morning. 90 tablet 3     triamcinolone (KENALOG) 0.1 % cream APPLY TOPICALLY TO THE AFFECTED AREA TWICE DAILY AS DIRECTED 45 g 1     vitamin D (ERGOCALCIFEROL) 1.25 MG (17718 UT) capsule capsule Take 1 capsule by mouth 1 (One) Time Per Week. 13 capsule 1          Home Meds:      Prior to Admission medications    Medication Sig Start Date End Date Taking? Authorizing Provider   albuterol sulfate  (90 Base) MCG/ACT inhaler Inhale 2 puffs Every 4 (Four) Hours As Needed for Wheezing.  Patient not  taking: Reported on 7/5/2023 1/18/23   Christin De Jesus APRN   amitriptyline (ELAVIL) 25 MG tablet Take 2 tablets by mouth Every Night. 4/16/21   Provider, MD Thea   amLODIPine (NORVASC) 10 MG tablet Take 1 tablet by mouth Daily. 12/7/23   Toño Valdovinos DO   aspirin 81 MG EC tablet Take 1 tablet by mouth Daily.    Provider, MD Thea   carvedilol (COREG) 12.5 MG tablet TAKE 1 TABLET BY MOUTH TWICE DAILY WITH MEALS 1/15/24   Toño Valdovinos DO   gabapentin (NEURONTIN) 300 MG capsule Take 1 capsule by mouth 2 (Two) Times a Day.    Provider, MD Thea   levothyroxine (SYNTHROID, LEVOTHROID) 50 MCG tablet Take 1 tablet by mouth Daily. 2/20/24   Toño Valdovinos DO   losartan (COZAAR) 100 MG tablet Take 1 tablet by mouth Every Morning. 2/19/24   Toño Valdovinos DO   triamcinolone (KENALOG) 0.1 % cream APPLY TOPICALLY TO THE AFFECTED AREA TWICE DAILY AS DIRECTED 8/1/23   Rajendra Sam APRN   vitamin D (ERGOCALCIFEROL) 1.25 MG (81826 UT) capsule capsule Take 1 capsule by mouth 1 (One) Time Per Week. 8/30/23   Rajendra Sam APRN            Allergies:  Bupropion      Objective     Vital Signs   Temp:  [97.4 °F (36.3 °C)] 97.4 °F (36.3 °C)  Heart Rate:  [62] 62  Resp:  [16] 16  BP: (171)/(83) 171/83    Physical Exam:     Physical Exam    NAD, A/O x 4, normal circulation, normal respiration      Result Review :     Assessment & Plan     There are no diagnoses linked to this encounter.       Risks including bleeding, perforation, pain, infection, missed polyp(s). Benefits, and alternatives of Colonoscopy discussed with patient.  All questions answered.  Consent verified.         Sudeep Sutherland MD  03/21/24 08:42 EDT    no

## 2024-03-22 LAB
CYTO UR: NORMAL
LAB AP CASE REPORT: NORMAL
LAB AP CLINICAL INFORMATION: NORMAL
PATH REPORT.FINAL DX SPEC: NORMAL
PATH REPORT.GROSS SPEC: NORMAL

## 2024-04-03 ENCOUNTER — TELEPHONE (OUTPATIENT)
Dept: SURGERY | Facility: CLINIC | Age: 64
End: 2024-04-03
Payer: MEDICARE

## 2024-04-03 NOTE — TELEPHONE ENCOUNTER
CALLED PT TO OFFER R/S OF CX'D POST OP 4/9 W/ APRIL    SPOKE W/ PT WHO DECLINED TO R/S    ANYTHING ELSE TO DO?

## 2024-05-14 ENCOUNTER — OFFICE VISIT (OUTPATIENT)
Dept: FAMILY MEDICINE CLINIC | Facility: CLINIC | Age: 64
End: 2024-05-14
Payer: MEDICARE

## 2024-05-14 VITALS
SYSTOLIC BLOOD PRESSURE: 150 MMHG | HEIGHT: 68 IN | OXYGEN SATURATION: 95 % | WEIGHT: 226.4 LBS | BODY MASS INDEX: 34.31 KG/M2 | RESPIRATION RATE: 18 BRPM | DIASTOLIC BLOOD PRESSURE: 70 MMHG | HEART RATE: 51 BPM | TEMPERATURE: 97.6 F

## 2024-05-14 DIAGNOSIS — G62.0 PERIPHERAL NEUROPATHY DUE TO CHEMOTHERAPY: ICD-10-CM

## 2024-05-14 DIAGNOSIS — F17.211 CIGARETTE NICOTINE DEPENDENCE IN REMISSION: ICD-10-CM

## 2024-05-14 DIAGNOSIS — I10 PRIMARY HYPERTENSION: ICD-10-CM

## 2024-05-14 DIAGNOSIS — I10 ESSENTIAL (PRIMARY) HYPERTENSION: ICD-10-CM

## 2024-05-14 DIAGNOSIS — E03.9 HYPOTHYROIDISM, UNSPECIFIED TYPE: ICD-10-CM

## 2024-05-14 DIAGNOSIS — N18.30 STAGE 3 CHRONIC KIDNEY DISEASE, UNSPECIFIED WHETHER STAGE 3A OR 3B CKD: ICD-10-CM

## 2024-05-14 DIAGNOSIS — I48.91 ATRIAL FIBRILLATION, UNSPECIFIED TYPE: ICD-10-CM

## 2024-05-14 DIAGNOSIS — E55.9 VITAMIN D DEFICIENCY: ICD-10-CM

## 2024-05-14 DIAGNOSIS — Z51.81 MEDICATION MONITORING ENCOUNTER: ICD-10-CM

## 2024-05-14 DIAGNOSIS — Z23 NEED FOR TDAP VACCINATION: Primary | ICD-10-CM

## 2024-05-14 DIAGNOSIS — Z12.5 SCREENING FOR PROSTATE CANCER: ICD-10-CM

## 2024-05-14 DIAGNOSIS — J40 BRONCHITIS: ICD-10-CM

## 2024-05-14 DIAGNOSIS — G25.81 RESTLESS LEG: ICD-10-CM

## 2024-05-14 DIAGNOSIS — Z87.891 FORMER SMOKER: ICD-10-CM

## 2024-05-14 DIAGNOSIS — R73.09 ABNORMAL GLUCOSE: ICD-10-CM

## 2024-05-14 DIAGNOSIS — Z13.6 SCREENING FOR AAA (ABDOMINAL AORTIC ANEURYSM): ICD-10-CM

## 2024-05-14 DIAGNOSIS — T45.1X5A PERIPHERAL NEUROPATHY DUE TO CHEMOTHERAPY: ICD-10-CM

## 2024-05-14 DIAGNOSIS — C85.90 LYMPHOMA, UNSPECIFIED BODY REGION, UNSPECIFIED LYMPHOMA TYPE: ICD-10-CM

## 2024-05-14 LAB
25(OH)D3 SERPL-MCNC: 40.6 NG/ML (ref 30–100)
ALBUMIN SERPL-MCNC: 4.3 G/DL (ref 3.5–5.2)
ALBUMIN/GLOB SERPL: 1.7 G/DL
ALP SERPL-CCNC: 62 U/L (ref 39–117)
ALT SERPL W P-5'-P-CCNC: 19 U/L (ref 1–41)
ANION GAP SERPL CALCULATED.3IONS-SCNC: 10.9 MMOL/L (ref 5–15)
AST SERPL-CCNC: 18 U/L (ref 1–40)
BASOPHILS # BLD AUTO: 0.05 10*3/MM3 (ref 0–0.2)
BASOPHILS NFR BLD AUTO: 1 % (ref 0–1.5)
BILIRUB SERPL-MCNC: 0.5 MG/DL (ref 0–1.2)
BUN SERPL-MCNC: 19 MG/DL (ref 8–23)
BUN/CREAT SERPL: 14.1 (ref 7–25)
CALCIUM SPEC-SCNC: 9.1 MG/DL (ref 8.6–10.5)
CHLORIDE SERPL-SCNC: 106 MMOL/L (ref 98–107)
CHOLEST SERPL-MCNC: 153 MG/DL (ref 0–200)
CO2 SERPL-SCNC: 24.1 MMOL/L (ref 22–29)
CREAT SERPL-MCNC: 1.35 MG/DL (ref 0.76–1.27)
DEPRECATED RDW RBC AUTO: 45.3 FL (ref 37–54)
EGFRCR SERPLBLD CKD-EPI 2021: 58.3 ML/MIN/1.73
EOSINOPHIL # BLD AUTO: 0.19 10*3/MM3 (ref 0–0.4)
EOSINOPHIL NFR BLD AUTO: 3.6 % (ref 0.3–6.2)
ERYTHROCYTE [DISTWIDTH] IN BLOOD BY AUTOMATED COUNT: 13.5 % (ref 12.3–15.4)
GLOBULIN UR ELPH-MCNC: 2.5 GM/DL
GLUCOSE SERPL-MCNC: 107 MG/DL (ref 65–99)
HBA1C MFR BLD: 5.5 % (ref 4.8–5.6)
HCT VFR BLD AUTO: 44.8 % (ref 37.5–51)
HDLC SERPL-MCNC: 78 MG/DL (ref 40–60)
HGB BLD-MCNC: 14.7 G/DL (ref 13–17.7)
IMM GRANULOCYTES # BLD AUTO: 0.01 10*3/MM3 (ref 0–0.05)
IMM GRANULOCYTES NFR BLD AUTO: 0.2 % (ref 0–0.5)
LDLC SERPL CALC-MCNC: 65 MG/DL (ref 0–100)
LDLC/HDLC SERPL: 0.85 {RATIO}
LYMPHOCYTES # BLD AUTO: 1.14 10*3/MM3 (ref 0.7–3.1)
LYMPHOCYTES NFR BLD AUTO: 21.8 % (ref 19.6–45.3)
MCH RBC QN AUTO: 29.8 PG (ref 26.6–33)
MCHC RBC AUTO-ENTMCNC: 32.8 G/DL (ref 31.5–35.7)
MCV RBC AUTO: 90.9 FL (ref 79–97)
MONOCYTES # BLD AUTO: 0.56 10*3/MM3 (ref 0.1–0.9)
MONOCYTES NFR BLD AUTO: 10.7 % (ref 5–12)
NEUTROPHILS NFR BLD AUTO: 3.28 10*3/MM3 (ref 1.7–7)
NEUTROPHILS NFR BLD AUTO: 62.7 % (ref 42.7–76)
NRBC BLD AUTO-RTO: 0 /100 WBC (ref 0–0.2)
PLATELET # BLD AUTO: 195 10*3/MM3 (ref 140–450)
PMV BLD AUTO: 10.3 FL (ref 6–12)
POTASSIUM SERPL-SCNC: 3.8 MMOL/L (ref 3.5–5.2)
PROT SERPL-MCNC: 6.8 G/DL (ref 6–8.5)
PSA SERPL-MCNC: 0.5 NG/ML (ref 0–4)
RBC # BLD AUTO: 4.93 10*6/MM3 (ref 4.14–5.8)
SODIUM SERPL-SCNC: 141 MMOL/L (ref 136–145)
T4 FREE SERPL-MCNC: 1.19 NG/DL (ref 0.93–1.7)
TRIGL SERPL-MCNC: 43 MG/DL (ref 0–150)
TSH SERPL DL<=0.05 MIU/L-ACNC: 3.67 UIU/ML (ref 0.27–4.2)
VLDLC SERPL-MCNC: 10 MG/DL (ref 5–40)
WBC NRBC COR # BLD AUTO: 5.23 10*3/MM3 (ref 3.4–10.8)

## 2024-05-14 PROCEDURE — 80307 DRUG TEST PRSMV CHEM ANLYZR: CPT | Performed by: FAMILY MEDICINE

## 2024-05-14 PROCEDURE — 80053 COMPREHEN METABOLIC PANEL: CPT | Performed by: FAMILY MEDICINE

## 2024-05-14 PROCEDURE — 1126F AMNT PAIN NOTED NONE PRSNT: CPT | Performed by: FAMILY MEDICINE

## 2024-05-14 PROCEDURE — 36415 COLL VENOUS BLD VENIPUNCTURE: CPT | Performed by: FAMILY MEDICINE

## 2024-05-14 PROCEDURE — 85025 COMPLETE CBC W/AUTO DIFF WBC: CPT | Performed by: FAMILY MEDICINE

## 2024-05-14 PROCEDURE — 80061 LIPID PANEL: CPT | Performed by: FAMILY MEDICINE

## 2024-05-14 PROCEDURE — 84443 ASSAY THYROID STIM HORMONE: CPT | Performed by: FAMILY MEDICINE

## 2024-05-14 PROCEDURE — G0103 PSA SCREENING: HCPCS | Performed by: FAMILY MEDICINE

## 2024-05-14 PROCEDURE — 83036 HEMOGLOBIN GLYCOSYLATED A1C: CPT | Performed by: FAMILY MEDICINE

## 2024-05-14 PROCEDURE — 90715 TDAP VACCINE 7 YRS/> IM: CPT | Performed by: FAMILY MEDICINE

## 2024-05-14 PROCEDURE — 3077F SYST BP >= 140 MM HG: CPT | Performed by: FAMILY MEDICINE

## 2024-05-14 PROCEDURE — 90471 IMMUNIZATION ADMIN: CPT | Performed by: FAMILY MEDICINE

## 2024-05-14 PROCEDURE — 82306 VITAMIN D 25 HYDROXY: CPT | Performed by: FAMILY MEDICINE

## 2024-05-14 PROCEDURE — 84439 ASSAY OF FREE THYROXINE: CPT | Performed by: FAMILY MEDICINE

## 2024-05-14 PROCEDURE — 99214 OFFICE O/P EST MOD 30 MIN: CPT | Performed by: FAMILY MEDICINE

## 2024-05-14 PROCEDURE — 3078F DIAST BP <80 MM HG: CPT | Performed by: FAMILY MEDICINE

## 2024-05-14 RX ORDER — ROPINIROLE 1 MG/1
2 TABLET, FILM COATED ORAL NIGHTLY
Qty: 180 TABLET | Refills: 1 | Status: SHIPPED | OUTPATIENT
Start: 2024-05-14

## 2024-05-14 RX ORDER — AMLODIPINE BESYLATE 10 MG/1
10 TABLET ORAL DAILY
Qty: 90 TABLET | Refills: 1 | Status: SHIPPED | OUTPATIENT
Start: 2024-05-14

## 2024-05-14 RX ORDER — PRAMIPEXOLE DIHYDROCHLORIDE 0.25 MG/1
0.25 TABLET ORAL DAILY
COMMUNITY
Start: 2024-01-24 | End: 2024-05-14

## 2024-05-14 RX ORDER — TAMSULOSIN HYDROCHLORIDE 0.4 MG/1
0.4 CAPSULE ORAL
COMMUNITY
Start: 2023-10-20 | End: 2024-10-19

## 2024-05-14 NOTE — PROGRESS NOTES
Chief Complaint  Establish care  Restless leg  Neuropathy  Discuss getting labs done      Subjective          Jeyson Rose presents to [facility]  History of Present Illness  Patient presents today to establish care with myself.  He is a transfer of care from NANCY Asher.  Patient has not been seen at our clinic since March 2023.  He is being followed for hypertension, restless leg, hypothyroidism, chemotherapy-induced neuropathy, and vitamin D deficiency.  He has not had some labs done in quite some time as we discussed getting these done.  He is being followed by Dr. Sean Zuniga in [place] with ENT for his lymphoma.  He reports he was diagnosed with lymphoma back in 2016.  He did quit smoking at that time.  He did have recurrence 2 years later.  This is a lymphoma that occurred on the right side of his neck.  He is unsure what kind of lymphoma.  Looking back at records he did have a neoplasm of his tonsil as well as neoplasm of the mandible.  He reports having a biopsy of his tongue done recently and will have follow-up again with him this Thursday.  He is encouraged to keep follow-up appointments in this regard.  He has been followed by ENT for chronic kidney disease with Rupert Salas.  He does have issues with restless leg.  He reports that he tried his mother-in-law's Requip which worked better than pramipexole.  He reports taking up to 2 mg which is beneficial.  He was previously diagnosed with atrial fibrillation which is now resolved.  He is being followed by Dr. Houston Martini and does have an appointment in October.  He does need a screening for abdominal aortic aneurysm given his smoking history.  He also needs low-dose chest CT.  He smoked up to 2 packs/day for 30 years.  I did discuss his antihypertensive regimen.  Blood pressure is elevated today.  He reports just taking his medication prior to coming to the office.  He is currently taking carvedilol  "12.5 mg twice a day as well as amlodipine 10 mg daily and losartan 100 mg daily.  He is due to have a PSA level checked.  He is taking Flomax for BPH.  Patient has been off of vitamin D for deficiency.  He does use albuterol as he previously had an issue with bronchitis.    Current Outpatient Medications   Medication Instructions    albuterol sulfate  (90 Base) MCG/ACT inhaler 2 puffs, Inhalation, Every 4 Hours PRN    amLODIPine (NORVASC) 10 mg, Oral, Daily    aspirin 81 mg, Oral, Daily    carvedilol (COREG) 12.5 mg, Oral, 2 Times Daily With Meals    gabapentin (NEURONTIN) 300 MG capsule 1 capsule, Oral, 2 Times Daily    levothyroxine (SYNTHROID, LEVOTHROID) 50 mcg, Oral, Daily    losartan (COZAAR) 100 mg, Oral, Every Early Morning    rOPINIRole (REQUIP) 2 mg, Oral, Nightly    tamsulosin (FLOMAX) 0.4 mg, Oral    triamcinolone (KENALOG) 0.1 % cream APPLY TOPICALLY TO THE AFFECTED AREA TWICE DAILY AS DIRECTED    vitamin D (ERGOCALCIFEROL) 50,000 Units, Oral, Weekly       The following portions of the patient's history were reviewed and updated as appropriate: allergies, current medications, past family history, past medical history, past social history, past surgical history, and problem list.    Objective   Vital Signs:   /70 (BP Location: Left arm, Patient Position: Sitting, Cuff Size: Adult)   Pulse 51   Temp 97.6 °F (36.4 °C) (Oral)   Resp 18   Ht 172.7 cm (68\")   Wt 103 kg (226 lb 6.4 oz)   SpO2 95%   BMI 34.42 kg/m²     BP Readings from Last 3 Encounters:   05/14/24 150/70   03/21/24 117/99   08/25/23 136/78     Wt Readings from Last 3 Encounters:   05/14/24 103 kg (226 lb 6.4 oz)   03/21/24 102 kg (223 lb 15.8 oz)   08/25/23 103 kg (227 lb 3.2 oz)     BMI is >= 30 and <35. (Class 1 Obesity). The following options were offered after discussion;: exercise counseling/recommendations and nutrition counseling/recommendations     Physical Exam  Vitals reviewed.   Constitutional:       Appearance: " Normal appearance.   HENT:      Head: Normocephalic and atraumatic.      Right Ear: External ear normal.      Left Ear: External ear normal.   Eyes:      Conjunctiva/sclera: Conjunctivae normal.   Cardiovascular:      Rate and Rhythm: Normal rate and regular rhythm.      Heart sounds: No murmur heard.     No friction rub. No gallop.   Pulmonary:      Effort: Pulmonary effort is normal.      Breath sounds: Normal breath sounds. No wheezing or rhonchi.   Abdominal:      General: Bowel sounds are normal. There is no distension.      Palpations: Abdomen is soft.      Tenderness: There is no abdominal tenderness.   Skin:     General: Skin is warm and dry.   Neurological:      Mental Status: He is alert and oriented to person, place, and time.      Cranial Nerves: No cranial nerve deficit.   Psychiatric:         Mood and Affect: Mood and affect normal.         Behavior: Behavior normal.         Thought Content: Thought content normal.         Judgment: Judgment normal.            Result Review :   The following data was reviewed by: Toño Valdovinos DO on 05/14/2024:  Common labs          8/25/2023    10:12 10/16/2023    13:36   Common Labs   Glucose 111  109    BUN 25  18    Creatinine 1.38  1.37    Sodium 140  139    Potassium 4.2  3.8    Chloride 103  101    Calcium 9.3  9.5    Albumin 4.3     Total Bilirubin 0.5     Alkaline Phosphatase 70     AST (SGOT) 18     ALT (SGPT) 19     WBC 5.42  8.05    Hemoglobin 14.9  15.2    Hematocrit 44.0  45.0    Platelets 211  213             Lab Results   Component Value Date    SARSANTIGEN Not Detected 01/18/2023    COVID19 Not Detected 08/20/2022    RAPFLUA Negative 01/18/2023    RAPFLUB Negative 01/18/2023    INR 0.88 (L) 03/12/2021    BILIRUBINUR Negative 10/16/2023       Procedures        Assessment and Plan    Diagnoses and all orders for this visit:    1. Primary hypertension (Primary)    2. Bronchitis    3. Peripheral neuropathy due to chemotherapy    4. Lymphoma,  unspecified body region, unspecified lymphoma type    5. Restless leg    6. Essential (primary) hypertension  -     CBC & Differential  -     Comprehensive Metabolic Panel  -     Lipid Panel    7. Vitamin D deficiency  -     Vitamin D,25-Hydroxy    8. Medication monitoring encounter  -     Drug Screen 10 With / Conf, WB    9. Atrial fibrillation, unspecified type    10. Hypothyroidism, unspecified type  -     TSH+Free T4    11. Stage 3 chronic kidney disease, unspecified whether stage 3a or 3b CKD    12. Cigarette nicotine dependence in remission  -      CT Chest Low Dose Cancer Screening WO; Future    13. Former smoker  -      CT Chest Low Dose Cancer Screening WO; Future    14. Screening for AAA (abdominal aortic aneurysm)  -      AAA Screen Limited; Future    15. Need for Tdap vaccination    16. Screening for prostate cancer  -     PSA Screen    17. Abnormal glucose  -     Hemoglobin A1c    Other orders  -     rOPINIRole (REQUIP) 1 MG tablet; Take 2 tablets by mouth Every Night.  Dispense: 180 tablet; Refill: 1    Plan as documented above.  I did discuss with patient getting labs drawn today.  Further recommendations to follow once results return.  We discussed continuing current management of chronic conditions other than switching the Mirapex to take ropinirole.  I did discuss with patient seeing her back in 1 month for close follow-up.  He is instructed to call with any questions or concerns.      Medications Discontinued During This Encounter   Medication Reason    amitriptyline (ELAVIL) 25 MG tablet     pramipexole (MIRAPEX) 0.25 MG tablet           Follow Up   Return in about 1 month (around 6/14/2024) for Hypertension.  Patient was given instructions and counseling regarding his condition or for health maintenance advice. Please see specific information pulled into the AVS if appropriate.       Toño Valdovinos DO  05/14/24  10:05 EDT

## 2024-05-22 LAB
AMPHETAMINES BLD QL SCN: NEGATIVE NG/ML
BARBITURATES SERPLBLD QL: NEGATIVE UG/ML
BENZODIAZ BLD QL: NEGATIVE NG/ML
CANNABINOIDS BLD QL SCN: NEGATIVE NG/ML
COCAINE+BZE SERPLBLD QL SCN: NEGATIVE NG/ML
METHADONE BLD QL SCN: NEGATIVE NG/ML
OPIATES BLD QL SCN: NEGATIVE NG/ML
OXYCODONE+OXYMORPHONE SERPLBLD QL SCN: NEGATIVE NG/ML
PCP BLD QL SCN: NEGATIVE NG/ML
PROPOXYPH BLD QL SCN: NEGATIVE NG/ML

## 2024-06-05 ENCOUNTER — HOSPITAL ENCOUNTER (OUTPATIENT)
Dept: CT IMAGING | Facility: HOSPITAL | Age: 64
Discharge: HOME | End: 2024-06-05
Payer: MEDICARE

## 2024-06-05 ENCOUNTER — HOSPITAL ENCOUNTER (OUTPATIENT)
Dept: ULTRASOUND IMAGING | Facility: HOSPITAL | Age: 64
Discharge: HOME | End: 2024-06-05
Payer: MEDICARE

## 2024-06-05 DIAGNOSIS — Z13.6 SCREENING FOR AAA (ABDOMINAL AORTIC ANEURYSM): ICD-10-CM

## 2024-06-05 DIAGNOSIS — F17.211 CIGARETTE NICOTINE DEPENDENCE IN REMISSION: ICD-10-CM

## 2024-06-05 DIAGNOSIS — Z87.891 FORMER SMOKER: ICD-10-CM

## 2024-06-05 PROCEDURE — 71271 CT THORAX LUNG CANCER SCR C-: CPT

## 2024-06-05 PROCEDURE — 76706 US ABDL AORTA SCREEN AAA: CPT

## 2024-06-26 ENCOUNTER — TELEPHONE (OUTPATIENT)
Dept: FAMILY MEDICINE CLINIC | Facility: CLINIC | Age: 64
End: 2024-06-26
Payer: MEDICARE

## 2024-07-01 ENCOUNTER — HOSPITAL ENCOUNTER (OUTPATIENT)
Dept: CARDIOLOGY | Facility: HOSPITAL | Age: 64
Discharge: HOME | End: 2024-07-01
Admitting: OTOLARYNGOLOGY
Payer: MEDICARE

## 2024-07-01 ENCOUNTER — TRANSCRIBE ORDERS (OUTPATIENT)
Dept: ADMINISTRATIVE | Facility: HOSPITAL | Age: 64
End: 2024-07-01
Payer: MEDICARE

## 2024-07-01 DIAGNOSIS — D37.02 NEOPLASM OF UNCERTAIN BEHAVIOR OF TONGUE: ICD-10-CM

## 2024-07-01 DIAGNOSIS — Z01.818 PRE-OP TESTING: ICD-10-CM

## 2024-07-01 DIAGNOSIS — Z01.818 PRE-OP TESTING: Primary | ICD-10-CM

## 2024-07-01 LAB
QT INTERVAL: 463 MS
QTC INTERVAL: 416 MS

## 2024-07-01 PROCEDURE — 93005 ELECTROCARDIOGRAM TRACING: CPT | Performed by: OTOLARYNGOLOGY

## 2024-07-26 ENCOUNTER — HOSPITAL ENCOUNTER (EMERGENCY)
Facility: HOSPITAL | Age: 64
Discharge: HOME | End: 2024-07-27
Attending: EMERGENCY MEDICINE
Payer: MEDICARE

## 2024-07-26 DIAGNOSIS — T14.8XXA BLEEDING FROM WOUND: Primary | ICD-10-CM

## 2024-07-26 PROCEDURE — 99283 EMERGENCY DEPT VISIT LOW MDM: CPT

## 2024-07-27 VITALS
DIASTOLIC BLOOD PRESSURE: 94 MMHG | SYSTOLIC BLOOD PRESSURE: 177 MMHG | OXYGEN SATURATION: 95 % | HEIGHT: 68 IN | BODY MASS INDEX: 30.41 KG/M2 | TEMPERATURE: 98.2 F | RESPIRATION RATE: 18 BRPM | HEART RATE: 70 BPM | WEIGHT: 200.62 LBS

## 2024-07-27 LAB
ALBUMIN SERPL-MCNC: 4 G/DL (ref 3.5–5.2)
ALBUMIN/GLOB SERPL: 1.4 G/DL
ALP SERPL-CCNC: 63 U/L (ref 39–117)
ALT SERPL W P-5'-P-CCNC: 27 U/L (ref 1–41)
ANION GAP SERPL CALCULATED.3IONS-SCNC: 10.9 MMOL/L (ref 5–15)
AST SERPL-CCNC: 24 U/L (ref 1–40)
BASOPHILS # BLD AUTO: 0.03 10*3/MM3 (ref 0–0.2)
BASOPHILS NFR BLD AUTO: 0.3 % (ref 0–1.5)
BILIRUB SERPL-MCNC: 0.6 MG/DL (ref 0–1.2)
BUN SERPL-MCNC: 20 MG/DL (ref 8–23)
BUN/CREAT SERPL: 15.9 (ref 7–25)
CALCIUM SPEC-SCNC: 9.1 MG/DL (ref 8.6–10.5)
CHLORIDE SERPL-SCNC: 100 MMOL/L (ref 98–107)
CO2 SERPL-SCNC: 28.1 MMOL/L (ref 22–29)
CREAT SERPL-MCNC: 1.26 MG/DL (ref 0.76–1.27)
DEPRECATED RDW RBC AUTO: 46.8 FL (ref 37–54)
EGFRCR SERPLBLD CKD-EPI 2021: 62.9 ML/MIN/1.73
EOSINOPHIL # BLD AUTO: 0.06 10*3/MM3 (ref 0–0.4)
EOSINOPHIL NFR BLD AUTO: 0.6 % (ref 0.3–6.2)
ERYTHROCYTE [DISTWIDTH] IN BLOOD BY AUTOMATED COUNT: 13.8 % (ref 12.3–15.4)
GLOBULIN UR ELPH-MCNC: 2.8 GM/DL
GLUCOSE SERPL-MCNC: 111 MG/DL (ref 65–99)
HCT VFR BLD AUTO: 43.4 % (ref 37.5–51)
HGB BLD-MCNC: 14.3 G/DL (ref 13–17.7)
HOLD SPECIMEN: NORMAL
HOLD SPECIMEN: NORMAL
IMM GRANULOCYTES # BLD AUTO: 0.04 10*3/MM3 (ref 0–0.05)
IMM GRANULOCYTES NFR BLD AUTO: 0.4 % (ref 0–0.5)
LYMPHOCYTES # BLD AUTO: 0.88 10*3/MM3 (ref 0.7–3.1)
LYMPHOCYTES NFR BLD AUTO: 9.2 % (ref 19.6–45.3)
MCH RBC QN AUTO: 30.2 PG (ref 26.6–33)
MCHC RBC AUTO-ENTMCNC: 32.9 G/DL (ref 31.5–35.7)
MCV RBC AUTO: 91.6 FL (ref 79–97)
MONOCYTES # BLD AUTO: 0.84 10*3/MM3 (ref 0.1–0.9)
MONOCYTES NFR BLD AUTO: 8.8 % (ref 5–12)
NEUTROPHILS NFR BLD AUTO: 7.72 10*3/MM3 (ref 1.7–7)
NEUTROPHILS NFR BLD AUTO: 80.7 % (ref 42.7–76)
NRBC BLD AUTO-RTO: 0 /100 WBC (ref 0–0.2)
PLATELET # BLD AUTO: 189 10*3/MM3 (ref 140–450)
PMV BLD AUTO: 9.5 FL (ref 6–12)
POTASSIUM SERPL-SCNC: 3.6 MMOL/L (ref 3.5–5.2)
PROT SERPL-MCNC: 6.8 G/DL (ref 6–8.5)
RBC # BLD AUTO: 4.74 10*6/MM3 (ref 4.14–5.8)
SODIUM SERPL-SCNC: 139 MMOL/L (ref 136–145)
WBC NRBC COR # BLD AUTO: 9.57 10*3/MM3 (ref 3.4–10.8)
WHOLE BLOOD HOLD COAG: NORMAL
WHOLE BLOOD HOLD SPECIMEN: NORMAL

## 2024-07-27 PROCEDURE — 94640 AIRWAY INHALATION TREATMENT: CPT

## 2024-07-27 PROCEDURE — 94799 UNLISTED PULMONARY SVC/PX: CPT

## 2024-07-27 PROCEDURE — 80053 COMPREHEN METABOLIC PANEL: CPT | Performed by: EMERGENCY MEDICINE

## 2024-07-27 PROCEDURE — 85025 COMPLETE CBC W/AUTO DIFF WBC: CPT | Performed by: EMERGENCY MEDICINE

## 2024-07-27 RX ORDER — TRANEXAMIC ACID 100 MG/ML
500 INJECTION, SOLUTION INTRAVENOUS ONCE
Status: COMPLETED | OUTPATIENT
Start: 2024-07-27 | End: 2024-07-27

## 2024-07-27 RX ADMIN — TRANEXAMIC ACID 500 MG: 100 INJECTION, SOLUTION INTRAVENOUS at 02:00

## 2024-07-27 NOTE — DISCHARGE INSTRUCTIONS
Rest, drink plenty of fluids.  Continue with your postop instructions as advised.  Avoid any activities that may increase your risk for bleeding.  You may apply a light pressure and swish ice water should you have any returning bleeding.  Call Dr. Zuniga's number should you have any questions or concerns or any return bleeding.  You may also return to the emergency department immediately for any significant bleeding any uncontrolled bleeding or any fevers of 101 or greater, any airway difficulties or any new or worse concerns.  
Statement Selected

## 2024-07-27 NOTE — ED NOTES
Pt comes to the ER for tongue and mouth bleeding on the right side. Pt states that he had his right side of his tongue removed from cancer on Tuesday and started bleeding today. Pt states that he also has a hx of bone cancer. The bleeding is currently stopped/controlled.

## 2024-07-27 NOTE — ED PROVIDER NOTES
"Time: 12:10 AM EDT  Date of encounter:  7/26/2024  Independent Historian/Clinical History and Information was obtained by:   Patient and Family    History is limited by: N/A    Chief Complaint: POST-OP BLEEDING      History of Present Illness:      The patient presents to the emergency department and states that he had cancer removed from the right side of his tongue on Tuesday at a surgical center in [facility].  He states that he was seen by Dr. [facility]uniga with [facility] from [place].  He states that the surgery went well and there was no complications.  He states that he has been following his postop instructions rigorously since surgery and states that he is had no issues until tonight when he was watching television and states that he noticed that he had blood in his mouth.  He states that it continued for quite some time but has since stopped bleeding.  He denies any increased pain or swelling.  His airway is patent.  There is no obvious active bleeding on exam.  The area does not look infected and has no significant swelling or redness.      History provided by:  Patient and spouse   used: No        Patient Care Team  Primary Care Provider: Toño Valdovinos DO    Past Medical History:     Allergies   Allergen Reactions    Bupropion Other (See Comments)     Rash     Sulfa Antibiotics Rash     Past Medical History:   Diagnosis Date    Acid reflux disease     Arthritis     Cancer     in remission    Forgetfulness     Heart attack 2016    High blood pressure     History of cancer chemotherapy     History of irregular heartbeat     A-FIB, only 1 time d/t \"not taking b/p meds\"    History of radiation therapy     RIGHT NECK    Hypertension     Jaw cancer 04/2022    Left knee pain 08/2022    Lymphoma     Pain, knee     BILAT    Pedal edema     PONV (postoperative nausea and vomiting)     Tonsillar cancer     RIGHT, STAGE IV    Unsteady gait     D/T KNEES BILAT    Weakness  "    KNEES BILAT     Past Surgical History:   Procedure Laterality Date    CARDIAC CATHETERIZATION      COLONOSCOPY  2018    normal per patient    COLONOSCOPY N/A 3/21/2024    Procedure: COLONOSCOPY with hot snare polypectomy;  Surgeon: Sudeep Sutherland MD;  [facility];  Service: General;  Laterality: N/A;  colon polyp    EYE SURGERY      HERNIA REPAIR Bilateral     JOINT REPLACEMENT      NECK SURGERY Right     TONSILS, MASS, 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 MAIN OR;  Service: Orthopedics;  Laterality: Right;    TOTAL KNEE ARTHROPLASTY Left 8/23/2022    Procedure: Left total knee arthroplasty with robotics;  Surgeon: Song Marsh MD;  Location: [facility] MAIN OR;  Service: Robotics - Ortho;  Laterality: Left;     Family History   Problem Relation Age of Onset    Heart attack Maternal Grandfather     Malig Hyperthermia Neg Hx     Colon cancer Neg Hx        Home Medications:  Prior to Admission medications    Medication Sig Start Date End Date Taking? Authorizing Provider   albuterol sulfate  (90 Base) MCG/ACT inhaler Inhale 2 puffs Every 4 (Four) Hours As Needed for Wheezing. 1/18/23   Christin De Jesus APRN   amLODIPine (NORVASC) 10 MG tablet TAKE 1 TABLET BY MOUTH DAILY 5/14/24   Toño Valdovinos DO   aspirin 81 MG EC tablet Take 1 tablet by mouth Daily.    Provider, MD Thea   carvedilol (COREG) 12.5 MG tablet TAKE 1 TABLET BY MOUTH TWICE DAILY WITH MEALS 1/15/24   Toño Valdovinos DO   gabapentin (NEURONTIN) 300 MG capsule Take 1 capsule by mouth 2 (Two) Times a Day.    ProviderThea MD   levothyroxine (SYNTHROID, LEVOTHROID) 50 MCG tablet Take 1 tablet by mouth Daily. 2/20/24   Toño Valdovinos DO   losartan (COZAAR) 100 MG tablet Take 1 tablet by  mouth Every Morning. 24   Toño Valdovinos DO   rOPINIRole (REQUIP) 1 MG tablet Take 2 tablets by mouth Every Night. 24   Toño Valdovinos DO   tamsulosin (FLOMAX) 0.4 MG capsule 24 hr capsule Take 1 capsule by mouth. 10/20/23 10/19/24  Provider, MD Thea   triamcinolone (KENALOG) 0.1 % cream APPLY TOPICALLY TO THE AFFECTED AREA TWICE DAILY AS DIRECTED 23   Rajendra Sam APRN   vitamin D (ERGOCALCIFEROL) 1.25 MG (12134 UT) capsule capsule Take 1 capsule by mouth 1 (One) Time Per Week. 23   Rajendra Sam APRN        Social History:   Social History     Tobacco Use    Smoking status: Former     Current packs/day: 0.00     Average packs/day: 1.5 packs/day for 30.0 years (45.0 ttl pk-yrs)     Types: Cigarettes     Start date:      Quit date:      Years since quittin.5     Passive exposure: Past    Smokeless tobacco: Never   Vaping Use    Vaping status: Never Used   Substance Use Topics    Alcohol use: Yes     Alcohol/week: 5.0 standard drinks of alcohol     Types: 5 Cans of beer per week    Drug use: Never         Review of Systems:  Review of Systems   Constitutional:  Negative for chills and fever.   HENT:  Positive for mouth sores (Postop wounds to the right side of the tongue). Negative for congestion, drooling, ear pain, facial swelling, nosebleeds, sore throat and trouble swallowing.    Eyes:  Negative for pain.   Respiratory:  Negative for cough, chest tightness and shortness of breath.    Cardiovascular:  Negative for chest pain.   Gastrointestinal:  Negative for abdominal pain, diarrhea, nausea and vomiting.   Genitourinary:  Negative for dysuria, flank pain, frequency, hematuria and urgency.   Musculoskeletal:  Negative for back pain, joint swelling, neck pain and neck stiffness.   Skin:  Negative for pallor and rash.   Neurological:  Negative for seizures and headaches.   All other systems reviewed and are negative.       Physical Exam:  /94 (BP Location:  "Left arm, Patient Position: Lying)   Pulse 70   Temp 98.2 °F (36.8 °C) (Oral)   Resp 18   Ht 172.7 cm (68\")   Wt 91 kg (200 lb 9.9 oz)   SpO2 95%   BMI 30.50 kg/m²     Physical Exam  Vitals and nursing note reviewed.   Constitutional:       General: He is not in acute distress.     Appearance: Normal appearance. He is not ill-appearing or toxic-appearing.   HENT:      Head: Normocephalic and atraumatic.      Nose: Nose normal.      Mouth/Throat:      Mouth: Mucous membranes are moist.      Pharynx: Oropharynx is clear.   Eyes:      General: No scleral icterus.     Conjunctiva/sclera: Conjunctivae normal.      Pupils: Pupils are equal, round, and reactive to light.   Cardiovascular:      Rate and Rhythm: Normal rate and regular rhythm.      Pulses: Normal pulses.   Pulmonary:      Effort: Pulmonary effort is normal. No respiratory distress.      Breath sounds: Normal breath sounds. No wheezing.   Musculoskeletal:         General: Normal range of motion.      Cervical back: Normal range of motion and neck supple. No rigidity or tenderness.   Lymphadenopathy:      Cervical: No cervical adenopathy.   Skin:     General: Skin is warm and dry.      Capillary Refill: Capillary refill takes less than 2 seconds.      Findings: Lesion present. No erythema or rash.   Neurological:      General: No focal deficit present.      Mental Status: He is alert and oriented to person, place, and time. Mental status is at baseline.   Psychiatric:         Mood and Affect: Mood normal.         Behavior: Behavior normal.                Procedures:  Procedures      Medical Decision Making:      Comorbidities that affect care:    Hypertension, unsteady gait, radiation therapy, irregular heartbeat, tonsillar cancer, arthritis, high blood pressure, jaw cancer, weakness, history of chemotherapy, postoperative nausea and vomiting, lymphoma, cancer, heart attack, GERD, pedal edema    External Notes reviewed:    None      The following orders " were placed and all results were independently analyzed by me:  Orders Placed This Encounter   Procedures    Deputy Draw    Comprehensive Metabolic Panel    CBC Auto Differential    IP General Consult (Use specialty-specific consult if known)    CBC & Differential    Green Top (Gel)    Lavender Top    Gold Top - SST    Light Blue Top       Medications Given in the Emergency Department:  Medications   tranexamic acid nebulization (ED/Crit Care for hemoptysis) - VIAL DISPENSE 500 mg (500 mg Nebulization Given 7/27/24 0200)        ED Course:    ED Course as of 07/27/24 0315   Sat Jul 27, 2024   0130 I spoke with NANCY Ramírez with Sean Zuniga MD's office at [facility].  She states that she was the on-.  We discussed the patient's case and at that time the patient's bleeding had resolved.  She states to instruct him to call the office on Monday to follow-up with them next week and not to wait to see the doctor on August 7  She states that the bleeding return to have the patient's wish ice water and apply pressure and to follow-up with the nearest emergency department department.  Upon going into discussed this with the patient they did advise that he had had some residual bleeding but states it is only been very minor.  We did examine the patient again to see if there was any areas that was causing the bleeding.  This does seem to be more of a generalized problem with numerous areas that have a small amount of bleeding present.  We will try a TXA nebulizer to see how this does for the patient and his bleeding. [TC]   0257 The patient received the TXA nebulizer treatment and his bleeding has resolved.  He states that he feels very comfortable with going home.  We did discuss calling the office for direction if the bleeding did return it was not significantly serious.  They were instructed to return to the closest emergency department should the bleeding be severe or worrisome for them.  If not they state  that they will call the office and follow-up with wherever they are told to go.  The patient states that he feels very comfortable going home at this time and will return should he need to. [TC]      ED Course User Index  [TC] Heidi Coto APRN       Labs:    Lab Results (last 24 hours)       Procedure Component Value Units Date/Time    CBC & Differential [[id number]]  (Abnormal) Collected: 07/27/24 0013    Specimen: Blood Updated: 07/27/24 0018    Narrative:      The following orders were created for panel order CBC & Differential.  Procedure                               Abnormality         Status                     ---------                               -----------         ------                     CBC Auto Differential[[id number]]        Abnormal            Final result                 Please view results for these tests on the individual orders.    Comprehensive Metabolic Panel [[id number]]  (Abnormal) Collected: 07/27/24 0013    Specimen: Blood Updated: 07/27/24 0035     Glucose 111 mg/dL      BUN 20 mg/dL      Creatinine 1.26 mg/dL      Sodium 139 mmol/L      Potassium 3.6 mmol/L      Chloride 100 mmol/L      CO2 28.1 mmol/L      Calcium 9.1 mg/dL      Total Protein 6.8 g/dL      Albumin 4.0 g/dL      ALT (SGPT) 27 U/L      AST (SGOT) 24 U/L      Alkaline Phosphatase 63 U/L      Total Bilirubin 0.6 mg/dL      Globulin 2.8 gm/dL      A/G Ratio 1.4 g/dL      BUN/Creatinine Ratio 15.9     Anion Gap 10.9 mmol/L      eGFR 62.9 mL/min/1.73     Narrative:      GFR Normal >60  Chronic Kidney Disease <60  Kidney Failure <15      CBC Auto Differential [[id number]]  (Abnormal) Collected: 07/27/24 0013    Specimen: Blood Updated: 07/27/24 0018     WBC 9.57 10*3/mm3      RBC 4.74 10*6/mm3      Hemoglobin 14.3 g/dL      Hematocrit 43.4 %      MCV 91.6 fL      MCH 30.2 pg      MCHC 32.9 g/dL      RDW 13.8 %      RDW-SD 46.8 fl      MPV 9.5 fL      Platelets 189 10*3/mm3      Neutrophil % 80.7 %      Lymphocyte % 9.2 %       Monocyte % 8.8 %      Eosinophil % 0.6 %      Basophil % 0.3 %      Immature Grans % 0.4 %      Neutrophils, Absolute 7.72 10*3/mm3      Lymphocytes, Absolute 0.88 10*3/mm3      Monocytes, Absolute 0.84 10*3/mm3      Eosinophils, Absolute 0.06 10*3/mm3      Basophils, Absolute 0.03 10*3/mm3      Immature Grans, Absolute 0.04 10*3/mm3      nRBC 0.0 /100 WBC              Imaging:    No Radiology Exams Resulted Within Past 24 Hours      Differential Diagnosis and Discussion:    Postop bleeding to the tongue.    All labs were reviewed and interpreted by me.    MDM     Amount and/or Complexity of Data Reviewed  Clinical lab tests: reviewed         Patient Care Considerations:    ANTIBIOTICS: I considered prescribing antibiotics as an outpatient however no bacterial focus of infection was found.      Consultants/Shared Management Plan:    Consultant: I have discussed the case with NANCY Aden on-call for Sean Manuel MD who states if the bleeding stops the patient can be discharged.  Have him continue with cold ice water swishes and gentle pressure if bleeding persist.    Social Determinants of Health:    Patient is independent, reliable, and has access to care.       Disposition and Care Coordination:    Discharged: The patient is suitable and stable for discharge with no need for consideration of admission.    I have explained the patient´s condition, diagnoses and treatment plan based on the information available to me at this time. I have answered questions and addressed any concerns. The patient has a good  understanding of the patient´s diagnosis, condition, and treatment plan as can be expected at this point. The vital signs have been stable. The patient´s condition is stable and appropriate for discharge from the emergency department.      The patient will pursue further outpatient evaluation with the primary care physician or other designated or consulting physician as outlined in the discharge  instructions. They are agreeable to this plan of care and follow-up instructions have been explained in detail. The patient has received these instructions in written format and has expressed an understanding of the discharge instructions. The patient is aware that any significant change in condition or worsening of symptoms should prompt an immediate return to this or the closest emergency department or call to 911.  I have explained discharge medications and the need for follow up with the patient/caretakers. This was also printed in the discharge instructions. Patient was discharged with the following medications and follow up:      Medication List      No changes were made to your prescriptions during this visit.      Sean Zuniga MD  [address] [facility] [address]  [phone]    Call   MONDAY, FOR FOLLOW UP    Toño Valdovinos, DO  [address] [address]  [phone]      As needed, FOR FOLLOW UP       Final diagnoses:   Bleeding from wound        ED Disposition       ED Disposition   Discharge    Condition   Stable    Comment   --               This medical record created using voice recognition software.             Heidi Coto, NANCY  07/27/24 0315

## 2024-08-06 ENCOUNTER — OFFICE VISIT (OUTPATIENT)
Dept: FAMILY MEDICINE CLINIC | Facility: CLINIC | Age: 64
End: 2024-08-06
Payer: MEDICARE

## 2024-08-06 VITALS
OXYGEN SATURATION: 96 % | DIASTOLIC BLOOD PRESSURE: 72 MMHG | TEMPERATURE: 98.1 F | WEIGHT: 206.3 LBS | HEIGHT: 68 IN | SYSTOLIC BLOOD PRESSURE: 130 MMHG | BODY MASS INDEX: 31.27 KG/M2 | HEART RATE: 57 BPM

## 2024-08-06 DIAGNOSIS — I10 ESSENTIAL (PRIMARY) HYPERTENSION: Primary | ICD-10-CM

## 2024-08-06 DIAGNOSIS — E55.9 VITAMIN D DEFICIENCY: ICD-10-CM

## 2024-08-06 DIAGNOSIS — F17.211 CIGARETTE NICOTINE DEPENDENCE IN REMISSION: ICD-10-CM

## 2024-08-06 DIAGNOSIS — Z23 NEED FOR PNEUMOCOCCAL 20-VALENT CONJUGATE VACCINATION: ICD-10-CM

## 2024-08-06 DIAGNOSIS — R91.8 PULMONARY NODULES: ICD-10-CM

## 2024-08-06 DIAGNOSIS — R73.09 ABNORMAL GLUCOSE: ICD-10-CM

## 2024-08-06 DIAGNOSIS — Z13.6 SCREENING FOR AAA (ABDOMINAL AORTIC ANEURYSM): ICD-10-CM

## 2024-08-06 DIAGNOSIS — N18.30 STAGE 3 CHRONIC KIDNEY DISEASE, UNSPECIFIED WHETHER STAGE 3A OR 3B CKD: ICD-10-CM

## 2024-08-06 DIAGNOSIS — E78.5 HYPERLIPIDEMIA, UNSPECIFIED HYPERLIPIDEMIA TYPE: ICD-10-CM

## 2024-08-06 DIAGNOSIS — I48.91 ATRIAL FIBRILLATION, UNSPECIFIED TYPE: ICD-10-CM

## 2024-08-06 DIAGNOSIS — C02.9 TONGUE CANCER: ICD-10-CM

## 2024-08-06 DIAGNOSIS — I25.10 CORONARY ARTERY DISEASE INVOLVING NATIVE CORONARY ARTERY OF NATIVE HEART WITHOUT ANGINA PECTORIS: Chronic | ICD-10-CM

## 2024-08-06 DIAGNOSIS — E03.9 HYPOTHYROIDISM, UNSPECIFIED TYPE: ICD-10-CM

## 2024-08-06 DIAGNOSIS — K80.20 CALCULUS OF GALLBLADDER WITHOUT CHOLECYSTITIS WITHOUT OBSTRUCTION: ICD-10-CM

## 2024-08-06 PROCEDURE — 1126F AMNT PAIN NOTED NONE PRSNT: CPT | Performed by: FAMILY MEDICINE

## 2024-08-06 PROCEDURE — 90677 PCV20 VACCINE IM: CPT | Performed by: FAMILY MEDICINE

## 2024-08-06 PROCEDURE — 3075F SYST BP GE 130 - 139MM HG: CPT | Performed by: FAMILY MEDICINE

## 2024-08-06 PROCEDURE — 99214 OFFICE O/P EST MOD 30 MIN: CPT | Performed by: FAMILY MEDICINE

## 2024-08-06 PROCEDURE — G0009 ADMIN PNEUMOCOCCAL VACCINE: HCPCS | Performed by: FAMILY MEDICINE

## 2024-08-06 PROCEDURE — 3078F DIAST BP <80 MM HG: CPT | Performed by: FAMILY MEDICINE

## 2024-08-06 RX ORDER — ATORVASTATIN CALCIUM 10 MG/1
10 TABLET, FILM COATED ORAL DAILY
Qty: 90 TABLET | Refills: 1 | Status: SHIPPED | OUTPATIENT
Start: 2024-08-06

## 2024-08-06 NOTE — PROGRESS NOTES
Chief Complaint  Hypertension      Subjective          Jeyson Rose presents to [facility]  Hypertension    Follow-up    Patient presents today to follow-up for hypertension.  Blood pressure has been under improved control.  He is currently taking losartan 100 mg daily as well as carvedilol 12.5 mg twice daily and amlodipine 10 mg daily.  He continues to be followed by electrophysiology with Dr. Houston Martini.  He does have an appointment coming up on 10/8/2024.  He continues to be followed by Dr. Sean Zuniga for tongue cancer.  When I first met him on 5/14/2024 he had a biopsy done.  He reports that he ended up having cancer.  He had a portion of his tongue removed on the right side.  He did have some bleeding afterwards and went to the emergency room and received nebulized tranexamic acid.  He will have follow-up with him tomorrow.  Patient encouraged to keep appointment.  Will work on requesting records and patient reports that he would also ask for them at his appointment tomorrow.  He is due for pneumococcal 20 vaccination so we did discuss getting this done.  He continues to be followed by nephrology for chronic kidney disease and does have appointment with Dr. Patrice Antoine in October.  His most recent CMP though showed improved renal function with a creatinine at 1.26 and a GFR at 62.9.  I reviewed his previous lab work.  Vitamin D levels were adequate.  He is currently on supplementation.  TSH and free T4 levels were normal taking levothyroxine 50 mcg daily.  Patient with noted coronary artery disease on low-dose chest CT.  He is already taking aspirin.  He is not on a statin.  His LDL was 65.  We did discuss starting statin given history.  Current Outpatient Medications   Medication Instructions    albuterol sulfate  (90 Base) MCG/ACT inhaler 2 puffs, Inhalation, Every 4 Hours PRN    amLODIPine (NORVASC) 10 mg, Oral, Daily    aspirin 81 mg, Daily     "atorvastatin (LIPITOR) 10 mg, Oral, Daily    carvedilol (COREG) 12.5 mg, Oral, 2 Times Daily With Meals    gabapentin (NEURONTIN) 300 MG capsule 1 capsule, Oral, 2 Times Daily    levothyroxine (SYNTHROID, LEVOTHROID) 50 mcg, Oral, Daily    losartan (COZAAR) 100 mg, Oral, Every Early Morning    rOPINIRole (REQUIP) 2 mg, Oral, Nightly    tamsulosin (FLOMAX) 0.4 mg, Oral    triamcinolone (KENALOG) 0.1 % cream APPLY TOPICALLY TO THE AFFECTED AREA TWICE DAILY AS DIRECTED    vitamin D (ERGOCALCIFEROL) 50,000 Units, Oral, Weekly       The following portions of the patient's history were reviewed and updated as appropriate: allergies, current medications, past family history, past medical history, past social history, past surgical history, and problem list.    Objective   Vital Signs:   /72   Pulse 57   Temp 98.1 °F (36.7 °C)   Ht 172.7 cm (68\")   Wt 93.6 kg (206 lb 4.8 oz)   SpO2 96%   BMI 31.37 kg/m²     BP Readings from Last 3 Encounters:   08/06/24 130/72   07/26/24 177/94   05/14/24 150/70     Wt Readings from Last 3 Encounters:   08/06/24 93.6 kg (206 lb 4.8 oz)   07/26/24 91 kg (200 lb 9.9 oz)   05/14/24 103 kg (226 lb 6.4 oz)           Physical Exam  Vitals reviewed.   Constitutional:       Appearance: Normal appearance.   HENT:      Head: Normocephalic and atraumatic.      Right Ear: External ear normal.      Left Ear: External ear normal.      Mouth/Throat:      Comments: A portion of the tongue on the right side has been resected.  No active bleeding.  Eyes:      Conjunctiva/sclera: Conjunctivae normal.   Cardiovascular:      Rate and Rhythm: Normal rate and regular rhythm.      Heart sounds: No murmur heard.     No friction rub. No gallop.   Pulmonary:      Effort: Pulmonary effort is normal.      Breath sounds: Normal breath sounds. No wheezing or rhonchi.   Abdominal:      General: Bowel sounds are normal. There is no distension.      Palpations: Abdomen is soft.      Tenderness: There is no " abdominal tenderness.   Skin:     General: Skin is warm and dry.   Neurological:      Mental Status: He is alert and oriented to person, place, and time.      Cranial Nerves: No cranial nerve deficit.   Psychiatric:         Mood and Affect: Mood and affect normal.         Behavior: Behavior normal.         Thought Content: Thought content normal.         Judgment: Judgment normal.            Result Review :   The following data was reviewed by: Toño Valdovinos DO on 08/06/2024:  Common labs          10/16/2023    13:36 5/14/2024    10:36 7/27/2024    00:13   Common Labs   Glucose 109  107  111    BUN 18  19  20    Creatinine 1.37  1.35  1.26    Sodium 139  141  139    Potassium 3.8  3.8  3.6    Chloride 101  106  100    Calcium 9.5  9.1  9.1    Albumin  4.3  4.0    Total Bilirubin  0.5  0.6    Alkaline Phosphatase  62  63    AST (SGOT)  18  24    ALT (SGPT)  19  27    WBC 8.05  5.23  9.57    Hemoglobin 15.2  14.7  14.3    Hematocrit 45.0  44.8  43.4    Platelets 213  195  189    Total Cholesterol  153     Triglycerides  43     HDL Cholesterol  78     LDL Cholesterol   65     Hemoglobin A1C  5.50     PSA  0.497              Lab Results   Component Value Date    SARSANTIGEN Not Detected 01/18/2023    COVID19 Not Detected 08/20/2022    RAPFLUA Negative 01/18/2023    RAPFLUB Negative 01/18/2023    INR 0.88 (L) 03/12/2021    BILIRUBINUR Negative 10/16/2023       Procedures        Assessment and Plan    Diagnoses and all orders for this visit:    1. Essential (primary) hypertension (Primary)  -     CBC & Differential; Future  -     Comprehensive Metabolic Panel; Future    2. Tongue cancer    3. Cigarette nicotine dependence in remission  -      CT Chest Low Dose Cancer Screening WO; Future    4. Pulmonary nodules  -      CT Chest Low Dose Cancer Screening WO; Future    5. Calculus of gallbladder without cholecystitis without obstruction    6. Screening for AAA (abdominal aortic aneurysm)    7. Stage 3 chronic kidney  disease, unspecified whether stage 3a or 3b CKD    8. Atrial fibrillation, unspecified type    9. Vitamin D deficiency  -     Vitamin D,25-Hydroxy; Future    10. Hypothyroidism, unspecified type  -     TSH+Free T4; Future    11. Coronary artery disease involving native coronary artery of native heart without angina pectoris  -     Lipid Panel; Future    12. Need for pneumococcal 20-valent conjugate vaccination    13. Abnormal glucose  -     Hemoglobin A1c; Future    14. Hyperlipidemia, unspecified hyperlipidemia type  -     Lipid Panel; Future    Other orders  -     atorvastatin (Lipitor) 10 MG tablet; Take 1 tablet by mouth Daily.  Dispense: 90 tablet; Refill: 1    I discussed with patient continue current management of chronic conditions.  I did discuss with him seeing him back in 6 months with labs drawn again prior to next appointment.  We did discuss his low-dose chest CT as well during this visit today.  He does have a small 3 mm nodule send will require follow-up again in 1 year.  He quit smoking 8 years ago and smoked 1 and half packs per day for about 20 years.  His ultrasound aorta came back within normal caliber.  We discussed no further follow-up at this time.  Patient instructed to call with any questions or concerns.      There are no discontinued medications.       Follow Up   Return in about 6 months (around 2/6/2025) for Hypertension.  Patient was given instructions and counseling regarding his condition or for health maintenance advice. Please see specific information pulled into the AVS if appropriate.       Toño Valdovinos DO  08/06/24  08:38 EDT

## 2024-10-17 ENCOUNTER — TRANSCRIBE ORDERS (OUTPATIENT)
Dept: ADMINISTRATIVE | Facility: HOSPITAL | Age: 64
End: 2024-10-17
Payer: MEDICARE

## 2024-10-17 DIAGNOSIS — N18.30 STAGE 3 CHRONIC KIDNEY DISEASE, UNSPECIFIED WHETHER STAGE 3A OR 3B CKD: Primary | ICD-10-CM

## 2024-10-18 ENCOUNTER — LAB (OUTPATIENT)
Dept: LAB | Facility: HOSPITAL | Age: 64
End: 2024-10-18
Payer: MEDICARE

## 2024-10-18 DIAGNOSIS — N18.30 STAGE 3 CHRONIC KIDNEY DISEASE, UNSPECIFIED WHETHER STAGE 3A OR 3B CKD: ICD-10-CM

## 2024-10-18 LAB
ALBUMIN SERPL-MCNC: 3.8 G/DL (ref 3.5–5.2)
ANION GAP SERPL CALCULATED.3IONS-SCNC: 9 MMOL/L (ref 5–15)
BASOPHILS # BLD AUTO: 0.07 10*3/MM3 (ref 0–0.2)
BASOPHILS NFR BLD AUTO: 0.8 % (ref 0–1.5)
BILIRUB UR QL STRIP: NEGATIVE
BUN SERPL-MCNC: 16 MG/DL (ref 8–23)
BUN/CREAT SERPL: 13.2 (ref 7–25)
CALCIUM SPEC-SCNC: 9.9 MG/DL (ref 8.6–10.5)
CHLORIDE SERPL-SCNC: 103 MMOL/L (ref 98–107)
CLARITY UR: CLEAR
CO2 SERPL-SCNC: 27 MMOL/L (ref 22–29)
COLOR UR: YELLOW
CREAT SERPL-MCNC: 1.21 MG/DL (ref 0.76–1.27)
CREAT UR-MCNC: 55.4 MG/DL
DEPRECATED RDW RBC AUTO: 41 FL (ref 37–54)
EGFRCR SERPLBLD CKD-EPI 2021: 66 ML/MIN/1.73
EOSINOPHIL # BLD AUTO: 0.54 10*3/MM3 (ref 0–0.4)
EOSINOPHIL NFR BLD AUTO: 6.3 % (ref 0.3–6.2)
ERYTHROCYTE [DISTWIDTH] IN BLOOD BY AUTOMATED COUNT: 12.7 % (ref 12.3–15.4)
GLUCOSE SERPL-MCNC: 92 MG/DL (ref 65–99)
GLUCOSE UR STRIP-MCNC: NEGATIVE MG/DL
HCT VFR BLD AUTO: 42.2 % (ref 37.5–51)
HGB BLD-MCNC: 14.1 G/DL (ref 13–17.7)
HGB UR QL STRIP.AUTO: NEGATIVE
IMM GRANULOCYTES # BLD AUTO: 0.02 10*3/MM3 (ref 0–0.05)
IMM GRANULOCYTES NFR BLD AUTO: 0.2 % (ref 0–0.5)
KETONES UR QL STRIP: NEGATIVE
LEUKOCYTE ESTERASE UR QL STRIP.AUTO: NEGATIVE
LYMPHOCYTES # BLD AUTO: 0.95 10*3/MM3 (ref 0.7–3.1)
LYMPHOCYTES NFR BLD AUTO: 11 % (ref 19.6–45.3)
MCH RBC QN AUTO: 30.2 PG (ref 26.6–33)
MCHC RBC AUTO-ENTMCNC: 33.4 G/DL (ref 31.5–35.7)
MCV RBC AUTO: 90.4 FL (ref 79–97)
MONOCYTES # BLD AUTO: 1.09 10*3/MM3 (ref 0.1–0.9)
MONOCYTES NFR BLD AUTO: 12.6 % (ref 5–12)
NEUTROPHILS NFR BLD AUTO: 5.95 10*3/MM3 (ref 1.7–7)
NEUTROPHILS NFR BLD AUTO: 69.1 % (ref 42.7–76)
NITRITE UR QL STRIP: NEGATIVE
NRBC BLD AUTO-RTO: 0 /100 WBC (ref 0–0.2)
PH UR STRIP.AUTO: 7 [PH] (ref 5–8)
PHOSPHATE SERPL-MCNC: 2.6 MG/DL (ref 2.5–4.5)
PLATELET # BLD AUTO: 216 10*3/MM3 (ref 140–450)
PMV BLD AUTO: 9.8 FL (ref 6–12)
POTASSIUM SERPL-SCNC: 3.8 MMOL/L (ref 3.5–5.2)
PROT ?TM UR-MCNC: 15 MG/DL
PROT UR QL STRIP: ABNORMAL
PROT/CREAT UR: 0.27 MG/G{CREAT}
PTH-INTACT SERPL-MCNC: 44.1 PG/ML (ref 15–65)
RBC # BLD AUTO: 4.67 10*6/MM3 (ref 4.14–5.8)
SODIUM SERPL-SCNC: 139 MMOL/L (ref 136–145)
SP GR UR STRIP: 1.01 (ref 1–1.03)
UROBILINOGEN UR QL STRIP: ABNORMAL
WBC NRBC COR # BLD AUTO: 8.62 10*3/MM3 (ref 3.4–10.8)

## 2024-10-18 PROCEDURE — 81003 URINALYSIS AUTO W/O SCOPE: CPT

## 2024-10-18 PROCEDURE — 82570 ASSAY OF URINE CREATININE: CPT

## 2024-10-18 PROCEDURE — 85025 COMPLETE CBC W/AUTO DIFF WBC: CPT

## 2024-10-18 PROCEDURE — 84156 ASSAY OF PROTEIN URINE: CPT

## 2024-10-18 PROCEDURE — 83970 ASSAY OF PARATHORMONE: CPT

## 2024-10-18 PROCEDURE — 80069 RENAL FUNCTION PANEL: CPT

## 2024-10-18 PROCEDURE — 36415 COLL VENOUS BLD VENIPUNCTURE: CPT

## 2024-10-21 ENCOUNTER — TELEPHONE (OUTPATIENT)
Dept: FAMILY MEDICINE CLINIC | Facility: CLINIC | Age: 64
End: 2024-10-21

## 2024-10-21 NOTE — TELEPHONE ENCOUNTER
Caller: ALBARO SPIVEY    Relationship: Emergency Contact    Best call back number: [phone] (CAN LEAVE DETAILED VOICEMAIL IF NO ANSWER)    What medication are you requesting: SOMETHING TO HELP HIM SLEEP -  HE HAS CANCER AND HAS MORE SURGERIES COMING UP.   HIS TONGUE CANCER HAS COME BACK AND HE IS NOT ABLE TO EAT, AND HAS LOST WEIGHT..  HE IS VERY ANXIOUS AND WORRIED.  WIFE STATES HE WILL NOT SLEEP AND IS A WALKING ZOMBIE AND WOULD LIKE SOMETHING TO BE SENT IN AS SOON AS POSSIBLE    What are your current symptoms: NOT SLEEPING    PLEASE ADVISE     If a prescription is needed, what is your preferred pharmacy and phone number: [id number] - [facility] [place] - [address] AT [facility] - [phone]  - [phone] FX

## 2024-10-22 ENCOUNTER — TELEPHONE (OUTPATIENT)
Dept: FAMILY MEDICINE CLINIC | Facility: CLINIC | Age: 64
End: 2024-10-22

## 2024-10-22 ENCOUNTER — OFFICE VISIT (OUTPATIENT)
Dept: FAMILY MEDICINE CLINIC | Facility: CLINIC | Age: 64
End: 2024-10-22
Payer: MEDICARE

## 2024-10-22 VITALS
HEART RATE: 55 BPM | DIASTOLIC BLOOD PRESSURE: 82 MMHG | OXYGEN SATURATION: 99 % | TEMPERATURE: 98 F | WEIGHT: 201 LBS | HEIGHT: 68 IN | BODY MASS INDEX: 30.46 KG/M2 | SYSTOLIC BLOOD PRESSURE: 128 MMHG

## 2024-10-22 DIAGNOSIS — C85.90 LYMPHOMA, UNSPECIFIED BODY REGION, UNSPECIFIED LYMPHOMA TYPE: ICD-10-CM

## 2024-10-22 DIAGNOSIS — E78.5 HYPERLIPIDEMIA, UNSPECIFIED HYPERLIPIDEMIA TYPE: ICD-10-CM

## 2024-10-22 DIAGNOSIS — E03.9 HYPOTHYROIDISM, UNSPECIFIED TYPE: ICD-10-CM

## 2024-10-22 DIAGNOSIS — I25.10 CORONARY ARTERY DISEASE INVOLVING NATIVE CORONARY ARTERY OF NATIVE HEART WITHOUT ANGINA PECTORIS: ICD-10-CM

## 2024-10-22 DIAGNOSIS — N18.30 STAGE 3 CHRONIC KIDNEY DISEASE, UNSPECIFIED WHETHER STAGE 3A OR 3B CKD: ICD-10-CM

## 2024-10-22 DIAGNOSIS — C02.9 TONGUE CANCER: ICD-10-CM

## 2024-10-22 DIAGNOSIS — I10 PRIMARY HYPERTENSION: Primary | ICD-10-CM

## 2024-10-22 PROCEDURE — 1126F AMNT PAIN NOTED NONE PRSNT: CPT | Performed by: FAMILY MEDICINE

## 2024-10-22 PROCEDURE — 99214 OFFICE O/P EST MOD 30 MIN: CPT | Performed by: FAMILY MEDICINE

## 2024-10-22 PROCEDURE — 3074F SYST BP LT 130 MM HG: CPT | Performed by: FAMILY MEDICINE

## 2024-10-22 PROCEDURE — 3079F DIAST BP 80-89 MM HG: CPT | Performed by: FAMILY MEDICINE

## 2024-10-22 RX ORDER — OXYCODONE AND ACETAMINOPHEN 10; 325 MG/1; MG/1
1 TABLET ORAL EVERY 6 HOURS PRN
Qty: 30 TABLET | Refills: 0 | Status: SHIPPED | OUTPATIENT
Start: 2024-10-22 | End: 2024-10-22 | Stop reason: SDUPTHER

## 2024-10-22 RX ORDER — OFLOXACIN 3 MG/ML
SOLUTION AURICULAR (OTIC)
COMMUNITY
Start: 2024-10-16

## 2024-10-22 RX ORDER — TAMSULOSIN HYDROCHLORIDE 0.4 MG/1
0.4 CAPSULE ORAL
COMMUNITY
Start: 2024-10-11

## 2024-10-22 RX ORDER — OXYCODONE AND ACETAMINOPHEN 10; 325 MG/1; MG/1
1 TABLET ORAL EVERY 6 HOURS PRN
Qty: 30 TABLET | Refills: 0 | Status: SHIPPED | OUTPATIENT
Start: 2024-10-22

## 2024-10-22 NOTE — PROGRESS NOTES
Chief Complaint  Insomnia and Earache    Subjective          Jeyson Rose presents to [facility]    History of Present Illness     History of Present Illness  The patient is a 64-year-old male who presents today for follow-up. He is accompanied by an adult female.    He reports experiencing pain in his right ear, which he believes is related to his cancer. He reports difficulty sleeping due to stabbing pain in his ear, which is not relieved by oxycodone 5 mg. He is unable to eat solid food due to difficulty swallowing.    He has a history of squamous cell carcinoma, HPV mediated positive, and underwent partial mandible removal in 2022. He also sees Dr. Sean Zuniga for his tongue cancer. He has been advised to have his teeth removed due to previous radiation therapy. He is scheduled for a biopsy on 11/07/2024 and has a follow-up appointment with Dr. Sean Manuel on the same day. He underwent surgery on 07/23/2020 and has a PET scan scheduled for 10/28/2024.    He has been diagnosed with chronic kidney disease and is under the care of Dr. Patrice Antoine. His current medications include losartan 100 mg daily, carvedilol 12.5 mg twice daily, and amlodipine 10 mg daily. He is currently taking vitamin D supplements and levothyroxine 50 mcg daily. He was started on atorvastatin 10 mg for coronary artery disease, which he tolerates well. He does not require any medication refills at this time. He recently had blood work done and continues to be under the care of Dr. Houston Martini, a cardiologist, whom he saw last week.         Current Outpatient Medications   Medication Instructions    albuterol sulfate  (90 Base) MCG/ACT inhaler 2 puffs, Inhalation, Every 4 Hours PRN    amLODIPine (NORVASC) 10 mg, Oral, Daily    aspirin 81 mg, Daily    atorvastatin (LIPITOR) 10 mg, Oral, Daily    carvedilol (COREG) 12.5 mg, Oral, 2 Times Daily With Meals    gabapentin (NEURONTIN) 300 MG  "capsule 1 capsule, 2 Times Daily    levothyroxine (SYNTHROID, LEVOTHROID) 50 mcg, Oral, Daily    losartan (COZAAR) 100 mg, Oral, Every Early Morning    naloxone (NARCAN) 4 MG/0.1ML nasal spray Call 911. Don't prime. [place] in 1 nostril for overdose. Repeat in 2-3 minutes in other nostril if no or minimal breathing/responsiveness.    ofloxacin (FLOXIN) 0.3 % otic solution INSTILL 5 DROPS TO AFFECTED EAR TWICE DAILY FOR 10 DAYS    oxyCODONE-acetaminophen (Percocet)  MG per tablet 1 tablet, Oral, Every 6 Hours PRN    rOPINIRole (REQUIP) 2 mg, Oral, Nightly    tamsulosin (FLOMAX) 0.4 mg    triamcinolone (KENALOG) 0.1 % cream APPLY TOPICALLY TO THE AFFECTED AREA TWICE DAILY AS DIRECTED    vitamin D (ERGOCALCIFEROL) 50,000 Units, Oral, Weekly       The following portions of the patient's history were reviewed and updated as appropriate: allergies, current medications, past family history, past medical history, past social history, past surgical history, and problem list.    Objective   Vital Signs:   /82   Pulse 55   Temp 98 °F (36.7 °C)   Ht 172.7 cm (68\")   Wt 91.2 kg (201 lb)   SpO2 99%   BMI 30.56 kg/m²     BP Readings from Last 3 Encounters:   10/22/24 128/82   08/06/24 130/72   07/26/24 177/94     Wt Readings from Last 3 Encounters:   10/22/24 91.2 kg (201 lb)   08/06/24 93.6 kg (206 lb 4.8 oz)   07/26/24 91 kg (200 lb 9.9 oz)           Physical Exam  Vitals reviewed.   Constitutional:       Appearance: Normal appearance.   HENT:      Head: Normocephalic and atraumatic.      Right Ear: External ear normal.      Left Ear: External ear normal.      Mouth/Throat:      Comments: Mass noted on the right lateral aspect of the tongue.  The tongue deviates towards the right on protrusion.  Eyes:      Conjunctiva/sclera: Conjunctivae normal.   Cardiovascular:      Rate and Rhythm: Normal rate and regular rhythm.      Heart sounds: No murmur heard.     No friction rub. No gallop.   Pulmonary:      Effort: " Pulmonary effort is normal.      Breath sounds: Normal breath sounds. No wheezing or rhonchi.   Abdominal:      General: Bowel sounds are normal. There is no distension.      Palpations: Abdomen is soft.      Tenderness: There is no abdominal tenderness.   Skin:     General: Skin is warm and dry.   Neurological:      Mental Status: He is alert and oriented to person, place, and time.      Cranial Nerves: No cranial nerve deficit.   Psychiatric:         Mood and Affect: Mood and affect normal.         Behavior: Behavior normal.         Thought Content: Thought content normal.         Judgment: Judgment normal.            Result Review :   The following data was reviewed by: Toño Valdovinos DO on 10/22/2024:  Common labs          5/14/2024    10:36 7/27/2024    00:13 10/18/2024    11:25   Common Labs   Glucose 107  111  92    BUN 19  20  16    Creatinine 1.35  1.26  1.21    Sodium 141  139  139    Potassium 3.8  3.6  3.8    Chloride 106  100  103    Calcium 9.1  9.1  9.9    Albumin 4.3  4.0  3.8    Total Bilirubin 0.5  0.6     Alkaline Phosphatase 62  63     AST (SGOT) 18  24     ALT (SGPT) 19  27     WBC 5.23  9.57  8.62    Hemoglobin 14.7  14.3  14.1    Hematocrit 44.8  43.4  42.2    Platelets 195  189  216    Total Cholesterol 153      Triglycerides 43      HDL Cholesterol 78      LDL Cholesterol  65      Hemoglobin A1C 5.50      PSA 0.497               Lab Results   Component Value Date    SARSANTIGEN Not Detected 01/18/2023    COVID19 Not Detected 08/20/2022    RAPFLUA Negative 01/18/2023    RAPFLUB Negative 01/18/2023    INR 0.88 (L) 03/12/2021    BILIRUBINUR Negative 10/18/2024       Results  Laboratory Studies  LDL cholesterol was 65.    Procedures        Assessment and Plan    Diagnoses and all orders for this visit:    1. Primary hypertension (Primary)    2. Tongue cancer    3. Stage 3 chronic kidney disease, unspecified whether stage 3a or 3b CKD    4. Hypothyroidism, unspecified type    5.  Hyperlipidemia, unspecified hyperlipidemia type    6. Coronary artery disease involving native coronary artery of native heart without angina pectoris    7. Lymphoma, unspecified body region, unspecified lymphoma type    Other orders  -     oxyCODONE-acetaminophen (Percocet)  MG per tablet; Take 1 tablet by mouth Every 6 (Six) Hours As Needed for Moderate Pain.  Dispense: 30 tablet; Refill: 0  -     naloxone (NARCAN) 4 MG/0.1ML nasal spray; Call 911. Don't prime. [place] in 1 nostril for overdose. Repeat in 2-3 minutes in other nostril if no or minimal breathing/responsiveness.  Dispense: 1 each; Refill: 0        Assessment & Plan  1. Right ear pain.  The right ear appears normal upon examination, suggesting the pain might be referred or due to a tender lymph node. He reports a stabbing pain in the ear that is affecting his sleep. A prescription for Percocet 10 mg every 6 hours was provided, along with Narcan as a precautionary measure. He was advised to avoid combining Percocet with other medications. If the pain intensifies, he should contact the office or visit the [facility] in [place].    2. Squamous cell carcinoma of the tongue.  He has a history of squamous cell carcinoma of the tongue, with a portion of the tongue removed and recent surgery in July 2023. He reports new symptoms and will have a PET scan on October 28, 2024, followed by a biopsy on November 7, 2024. He was advised to follow up with his oncologist and keep the office updated on the results.    3. Chronic kidney disease.  His kidney function appears stable based on recent labs. He should continue to follow up with his nephrologist.    4. Hypertension.  He is currently on losartan 100 mg daily, carvedilol 12.5 mg twice a day, and amlodipine 10 mg daily. He should continue his current medication regimen and follow up with his cardiologist.    5. Hyperlipidemia.  He is on atorvastatin 10 mg daily and reports tolerating it well. He should  continue this medication.    6. Insomnia.  He reports difficulty sleeping due to ear pain. The prescribed Percocet may help alleviate the pain and improve sleep.    Follow-up  Return in 1 to 3 weeks for follow-up.       There are no discontinued medications.       Follow Up   Return in about 3 weeks (around 11/12/2024) for tongue cancer.  Patient was given instructions and counseling regarding his condition or for health maintenance advice. Please see specific information pulled into the AVS if appropriate.     Patient or patient representative verbalized consent for the use of Ambient Listening during the visit with  Toño Valdovinos DO for chart documentation. 10/22/2024  09:12 EDT    Toño Valdovinos DO  10/22/24  09:12 EDT

## 2024-10-22 NOTE — TELEPHONE ENCOUNTER
Caller: Deandra Rosechristiano HARRIS - PLEASE READ NOTES    Relationship: Emergency Contact    Best call back number: [phone]     Requested Prescriptions:   Requested Prescriptions     Pending Prescriptions Disp Refills    naloxone (NARCAN) 4 MG/0.1ML nasal spray 1 each 0     Sig: Call 911. Don't prime. [place] in 1 nostril for overdose. Repeat in 2-3 minutes in other nostril if no or minimal breathing/responsiveness.    oxyCODONE-acetaminophen (Percocet)  MG per tablet 30 tablet 0     Sig: Take 1 tablet by mouth Every 6 (Six) Hours As Needed for Moderate Pain.        Pharmacy where request should be sent: [id number] - [place], [place] - [address] [facility] AT [facility] [address] /[facility] & [place] - [phone] [facility] [phone] FX     Last office visit with prescribing clinician: 10/22/2024   Last telemedicine visit with prescribing clinician: Visit date not found   Next office visit with prescribing clinician: 11/12/2024     Additional details provided by patient: [facility] DOES NOT HAVE MEDICATION IN STOCK AND WON'T HAVE IT FOR A FEW DAYS. PATIENT IS NEEDING MEDICATION CALLED INTO [facility] IN [place].     Does the patient have less than a 3 day supply:  [x] Yes  [] No    Would you like a call back once the refill request has been completed: [x] Yes [] No    If the office needs to give you a call back, can they leave a voicemail: [x] Yes [] No    Miguel Castano Rep   10/22/24 12:50 EDT

## 2024-12-02 DIAGNOSIS — I10 ESSENTIAL (PRIMARY) HYPERTENSION: ICD-10-CM

## 2024-12-02 RX ORDER — ROPINIROLE 1 MG/1
2 TABLET, FILM COATED ORAL NIGHTLY
Qty: 180 TABLET | Refills: 1 | Status: SHIPPED | OUTPATIENT
Start: 2024-12-02

## 2024-12-02 RX ORDER — AMLODIPINE BESYLATE 10 MG/1
10 TABLET ORAL DAILY
Qty: 90 TABLET | Refills: 1 | Status: SHIPPED | OUTPATIENT
Start: 2024-12-02

## 2025-01-23 ENCOUNTER — TELEPHONE (OUTPATIENT)
Dept: FAMILY MEDICINE CLINIC | Facility: CLINIC | Age: 65
End: 2025-01-23
Payer: MEDICARE

## 2025-01-23 NOTE — TELEPHONE ENCOUNTER
Phone call placed to patients wife with instructions to ensure the patient is drinking a sufficient amount of water and to the oncologist nurse regarding the blood pressure as some chemo does affect the blood pressure. Phone number given.

## 2025-01-23 NOTE — TELEPHONE ENCOUNTER
Caller: Radha Rose    Relationship to patient: Emergency Contact    Best call back number: [phone]     Patient is needing: PATIENT'S WIFE IS REQUESTING A CALL BACK. SHE STATED THAT PATIENT'S BLOOD PRESSURE HAS BEEN RUNNING REAL LOW. HE IS DOING CHEMO. PLEASE CALL AND ADVISE.

## 2025-01-24 NOTE — TELEPHONE ENCOUNTER
Spoke with Radha, patient's wife, to relay Tricia's message. Patient is getting fluids periodically and is on a feeding tube currently. She has spoken to the oncology nurses and they directed Radha to speak with PCP's office.  His blood pressure was the followin25- 132/60  25- 108/51  25- 93/77  25- 94/44  25- 117/68  25- 104/66  On 25, Patient did not take Amlodipine or Carvedilol. Patient would like to know if he should continue to not take these meds, or what else he should do.   Requesting a callback.

## 2025-01-24 NOTE — TELEPHONE ENCOUNTER
Thank you for the update.  Patient currently has losartan 100 mg daily listed as well as amlodipine 10 mg daily and carvedilol 12.5 mg twice daily.  I recommend continuing with medications when his blood pressures are close to normal or elevated around the 120/80 range.  Definitely take the medication if higher.  I would like for him to hold the amlodipine medication and not take it but take his other 2 medications if the systolic is less than 105 which is the upper number.  If systolic is around 90 or less please hold all antihypertensive medication and contact our office.  I agree that patient should be well-hydrated.  Plan to discuss further at follow-up visit.

## 2025-01-31 RX ORDER — ATORVASTATIN CALCIUM 10 MG/1
10 TABLET, FILM COATED ORAL DAILY
Qty: 90 TABLET | Refills: 1 | Status: SHIPPED | OUTPATIENT
Start: 2025-01-31

## 2025-02-06 ENCOUNTER — OFFICE VISIT (OUTPATIENT)
Dept: FAMILY MEDICINE CLINIC | Facility: CLINIC | Age: 65
End: 2025-02-06
Payer: MEDICARE

## 2025-02-06 VITALS
WEIGHT: 189.2 LBS | HEART RATE: 64 BPM | DIASTOLIC BLOOD PRESSURE: 64 MMHG | SYSTOLIC BLOOD PRESSURE: 100 MMHG | BODY MASS INDEX: 28.67 KG/M2 | TEMPERATURE: 97.9 F | HEIGHT: 68 IN | OXYGEN SATURATION: 98 %

## 2025-02-06 DIAGNOSIS — I10 ESSENTIAL (PRIMARY) HYPERTENSION: Primary | ICD-10-CM

## 2025-02-06 DIAGNOSIS — Z93.1 S/P PERCUTANEOUS ENDOSCOPIC GASTROSTOMY (PEG) TUBE PLACEMENT: ICD-10-CM

## 2025-02-06 DIAGNOSIS — I48.91 ATRIAL FIBRILLATION, UNSPECIFIED TYPE: ICD-10-CM

## 2025-02-06 DIAGNOSIS — E78.5 HYPERLIPIDEMIA, UNSPECIFIED HYPERLIPIDEMIA TYPE: ICD-10-CM

## 2025-02-06 DIAGNOSIS — I26.99 OTHER PULMONARY EMBOLISM WITHOUT ACUTE COR PULMONALE, UNSPECIFIED CHRONICITY: ICD-10-CM

## 2025-02-06 DIAGNOSIS — G62.0 PERIPHERAL NEUROPATHY DUE TO CHEMOTHERAPY: ICD-10-CM

## 2025-02-06 DIAGNOSIS — T45.1X5A PERIPHERAL NEUROPATHY DUE TO CHEMOTHERAPY: ICD-10-CM

## 2025-02-06 DIAGNOSIS — E03.9 HYPOTHYROIDISM, UNSPECIFIED TYPE: ICD-10-CM

## 2025-02-06 DIAGNOSIS — C76.0 PRIMARY SQUAMOUS CELL CARCINOMA OF HEAD AND NECK: Chronic | ICD-10-CM

## 2025-02-06 RX ORDER — GABAPENTIN 250 MG/5ML
300 SOLUTION ORAL 2 TIMES DAILY
Qty: 360 ML | Refills: 0 | Status: SHIPPED | OUTPATIENT
Start: 2025-02-06

## 2025-02-06 RX ORDER — ONDANSETRON 8 MG/1
8 TABLET, FILM COATED ORAL EVERY 8 HOURS PRN
COMMUNITY

## 2025-02-06 RX ORDER — PROMETHAZINE HYDROCHLORIDE 25 MG/1
25 TABLET ORAL EVERY 6 HOURS PRN
COMMUNITY

## 2025-02-06 RX ORDER — LOSARTAN POTASSIUM 100 MG/1
50 TABLET ORAL
Qty: 90 TABLET | Refills: 3 | Status: SHIPPED | OUTPATIENT
Start: 2025-02-06

## 2025-02-06 NOTE — PROGRESS NOTES
Chief Complaint  Tongue cancer  hypertension    Subjective          Jeyson Rose presents to [facility]    History of Present Illness     History of Present Illness  The patient is a 64-year-old male who presents today for a follow-up visit. He is accompanied by his wife.    He has been diagnosed with pulmonary embolism and is currently on Eliquis 2.5 mg twice daily. Aspirin has been discontinued. He is also on atorvastatin 10 mg for cholesterol management.    He has a history of atrial fibrillation, which was treated with anticoagulation therapy for a few months before being discontinued. No ablation procedure was performed. He is under the care of a cardiologist.    He has a history of thyroid issues and is currently on levothyroxine 50 mcg daily. He continues to see Dr. Davila's office for this condition.    He has been diagnosed with cancer since 2016 and has undergone radiation therapy in the past. He has a PEG tube in place and is on a liquid diet, consuming only juices and finely ground foods. His medications are crushed and administered via the PEG tube. He recently completed his last chemotherapy session and has not yet started radiation therapy. He is scheduled for surgery in March 2024, which will involve a large composite resection segmental mandibulectomy and subtotal glossectomy. The surgery is expected to last approximately 15 hours. He has a spot on his face that is part of the cancer. He has developed neuropathy from the chemotherapy and is on gabapentin 6 mL twice daily. He is awaiting approval from his oncologist for this medication. He is also on promethazine and Zofran for nausea, alternating between the two. He is on a liquid suspension form of oxycodone for pain management, prescribed by his oncology team. He also uses a fentanyl patch on his shoulder, which is replaced every 3 days. He has expressed concerns about delaying the surgery by a month or  so.    His blood pressure has been slightly low today. He has been experiencing orthostatic hypotension, characterized by lightheadedness upon standing. His current medication regimen includes losartan 100 mg daily, carvedilol 12.5 mg twice daily, and Eliquis 2.5 mg twice daily. Amlodipine 10 mg was discontinued a few weeks ago.    MEDICATIONS  Current: Losartan, carvedilol, Eliquis, gabapentin, atorvastatin, levothyroxine, promethazine, Zofran, fentanyl patch, oxycodone.  Discontinued: Amlodipine, aspirin.         Current Outpatient Medications   Medication Instructions    albuterol sulfate  (90 Base) MCG/ACT inhaler 2 puffs, Inhalation, Every 4 Hours PRN    amLODIPine (NORVASC) 10 mg, Oral, Daily    apixaban (ELIQUIS) 2.5 mg, 2 Times Daily    atorvastatin (LIPITOR) 10 mg, Oral, Daily    carvedilol (COREG) 12.5 mg, Oral, 2 Times Daily With Meals    esomeprazole (NEXIUM) 20 mg, Every Morning Before Breakfast    gabapentin (NEURONTIN) 300 mg, Oral, 2 Times Daily    levothyroxine (SYNTHROID, LEVOTHROID) 50 mcg, Oral, Daily    losartan (COZAAR) 50 mg, Oral, Every Early Morning    naloxone (NARCAN) 4 MG/0.1ML nasal spray Call 911. Don't prime. [place] in 1 nostril for overdose. Repeat in 2-3 minutes in other nostril if no or minimal breathing/responsiveness.    ofloxacin (FLOXIN) 0.3 % otic solution INSTILL 5 DROPS TO AFFECTED EAR TWICE DAILY FOR 10 DAYS    ondansetron (ZOFRAN) 8 mg, Every 8 Hours PRN    oxyCODONE-acetaminophen (Percocet)  MG per tablet 1 tablet, Oral, Every 6 Hours PRN    promethazine (PHENERGAN) 25 mg, Every 6 Hours PRN    rOPINIRole (REQUIP) 2 mg, Oral, Nightly    tamsulosin (FLOMAX) 0.4 mg    triamcinolone (KENALOG) 0.1 % cream APPLY TOPICALLY TO THE AFFECTED AREA TWICE DAILY AS DIRECTED    vitamin D (ERGOCALCIFEROL) 50,000 Units, Oral, Weekly       The following portions of the patient's history were reviewed and updated as appropriate: allergies, current medications, past family  "history, past medical history, past social history, past surgical history, and problem list.    Objective   Vital Signs:   /64   Pulse 64   Temp 97.9 °F (36.6 °C) (Axillary)   Ht 172.7 cm (68\")   Wt 85.8 kg (189 lb 3.2 oz)   SpO2 98%   BMI 28.77 kg/m²     BP Readings from Last 3 Encounters:   02/06/25 100/64   10/22/24 128/82   08/06/24 130/72     Wt Readings from Last 3 Encounters:   02/06/25 85.8 kg (189 lb 3.2 oz)   10/22/24 91.2 kg (201 lb)   08/06/24 93.6 kg (206 lb 4.8 oz)           Physical Exam  Vitals reviewed.   Constitutional:       Appearance: Normal appearance.   HENT:      Head: Normocephalic and atraumatic.      Right Ear: External ear normal.      Left Ear: External ear normal.      Mouth/Throat:      Comments: Firm induration noted on the right side of the mandible as well as tight muscles noted on the right anterior neck region.  Eyes:      Conjunctiva/sclera: Conjunctivae normal.   Cardiovascular:      Rate and Rhythm: Normal rate and regular rhythm.      Heart sounds: No murmur heard.     No friction rub. No gallop.   Pulmonary:      Effort: Pulmonary effort is normal.      Breath sounds: Normal breath sounds. No wheezing or rhonchi.   Abdominal:      General: Bowel sounds are normal. There is no distension.      Palpations: Abdomen is soft.      Tenderness: There is no abdominal tenderness.   Skin:     General: Skin is warm and dry.   Neurological:      Mental Status: He is alert and oriented to person, place, and time.      Cranial Nerves: No cranial nerve deficit.   Psychiatric:         Mood and Affect: Mood and affect normal.         Behavior: Behavior normal.         Thought Content: Thought content normal.         Judgment: Judgment normal.            Result Review :   The following data was reviewed by: Toño Valdovinos DO on 02/06/2025:  Common labs          5/14/2024    10:36 7/27/2024    00:13 10/18/2024    11:25   Common Labs   Glucose 107  111  92    BUN 19  20  16  "   Creatinine 1.35  1.26  1.21    Sodium 141  139  139    Potassium 3.8  3.6  3.8    Chloride 106  100  103    Calcium 9.1  9.1  9.9    Albumin 4.3  4.0  3.8    Total Bilirubin 0.5  0.6     Alkaline Phosphatase 62  63     AST (SGOT) 18  24     ALT (SGPT) 19  27     WBC 5.23  9.57  8.62    Hemoglobin 14.7  14.3  14.1    Hematocrit 44.8  43.4  42.2    Platelets 195  189  216    Total Cholesterol 153      Triglycerides 43      HDL Cholesterol 78      LDL Cholesterol  65      Hemoglobin A1C 5.50      PSA 0.497               Lab Results   Component Value Date    SARSANTIGEN Not Detected 01/18/2023    COVID19 Not Detected 08/20/2022    RAPFLUA Negative 01/18/2023    RAPFLUB Negative 01/18/2023    INR 0.88 (L) 03/12/2021    BILIRUBINUR Negative 12/12/2024       Results  Imaging  Chest CT from 11/05/2024 showed a small pulmonary embolus in the right lower lobe pulmonary artery. 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 Mass on the right tongue, difficult to measure due to ill-defined margins, but shape is estimated to measure at least 5.3 x 4.1 x 2.7. No discrete evidence of metastatic disease in the chest as of 01/29/2025.    Procedures        Assessment and Plan    Diagnoses and all orders for this visit:    1. Essential (primary) hypertension (Primary)  -     losartan (COZAAR) 100 MG tablet; Take 0.5 tablets by mouth Every Morning.  Dispense: 90 tablet; Refill: 3    2. Other pulmonary embolism without acute cor pulmonale, unspecified chronicity    3. Hyperlipidemia, unspecified hyperlipidemia type    4. S/P percutaneous endoscopic gastrostomy (PEG) tube placement    5. Primary squamous cell carcinoma of head and neck    6. Peripheral neuropathy due to chemotherapy  -     gabapentin (NEURONTIN) 50 mg/mL solution; Take 6 mL by mouth 2 (Two) Times a Day.  Dispense: 360 mL; Refill: 0    7. Hypothyroidism,  unspecified type    8. Atrial fibrillation, unspecified type        Assessment & Plan  1. Pulmonary embolism.  He is currently on Eliquis 2.5 mg twice a day due to his history of cancer, which increases his susceptibility to blood clots. He will continue this medication to manage the pulmonary embolism.    2. Blood pressure management.  His blood pressure readings have been consistently low. He is currently on losartan 100 mg daily, carvedilol 12.5 mg twice a day, and Eliquis 2.5 mg twice a day. Amlodipine 10 mg was discontinued a few weeks ago. The dosage of losartan will be reduced from 100 mg to 50 mg daily to prevent further drops in blood pressure and reduce the risk of falls. He will continue taking carvedilol 12.5 mg twice a day. He is advised to monitor his blood pressure and report any significant changes.    3. Neuropathy.  He developed neuropathy from chemotherapy and is currently taking gabapentin 6 mL (300 mg) in the morning and evening. A prescription for gabapentin 6 mL twice daily will be sent to [facility] in [place].  I am sending in this prescription today as they were having a hard time getting a hold of the cancer team for the refill and he would be out.    4. Pain management.  He is on a liquid suspension form of oxycodone for pain management, prescribed by his oncology team. He also uses a fentanyl patch on his shoulder, which is replaced every 3 days. He is advised to continue with his current pain management regimen and to reach out if he needs additional support.    5. Nausea.  He is currently alternating between promethazine and Zofran for nausea management. He is advised to continue this regimen and to contact the clinic if he needs further assistance.    6. Cholesterol management.  He is currently taking atorvastatin 10 mg daily for cholesterol management. He is advised to continue this medication as prescribed.    7. Thyroid management.  He is currently taking levothyroxine 50 mcg  once a day for thyroid management. His thyroid levels were checked recently on 12/30/2024 and were within normal limits. He is advised to continue this medication as prescribed.    8. Cancer management.  He has a history of cancer since 2016 and has undergone radiation therapy in the past. He recently completed his last chemotherapy session and has not yet started radiation therapy. He is scheduled for surgery in March 2024, which will involve a large composite resection segmental mandibulectomy and subtotal glossectomy. The surgery is expected to last approximately 15 hours. He has a spot on his face that is part of the cancer. He is advised to follow up with his cancer doctor to discuss the expectations and goals of his upcoming surgery. He is also encouraged to ask about the potential use of ivermectin, although it is not currently a standard of care.    9. Atrial fibrillation.  He has a history of atrial fibrillation, which was treated with anticoagulation therapy for a few months before being discontinued. No ablation procedure was performed. He is under the care of a cardiologist.    Follow-up  The patient will follow up in a couple of months.       Medications Discontinued During This Encounter   Medication Reason    losartan (COZAAR) 100 MG tablet     aspirin 81 MG EC tablet     gabapentin (NEURONTIN) 300 MG capsule     gabapentin (NEURONTIN) 300 MG capsule *Therapy completed          Follow Up   Return in about 2 months (around 4/6/2025) for Hypertension.  Patient was given instructions and counseling regarding his condition or for health maintenance advice. Please see specific information pulled into the AVS if appropriate.     Patient or patient representative verbalized consent for the use of Ambient Listening during the visit with  Toño Valdovinos DO for chart documentation. 2/6/2025  08:11 OLIVIER Valdovinos DO  02/06/25  08:48 EST

## 2025-03-02 DIAGNOSIS — E03.8 SUBCLINICAL HYPOTHYROIDISM: ICD-10-CM

## 2025-03-04 RX ORDER — LEVOTHYROXINE SODIUM 50 UG/1
50 TABLET ORAL DAILY
Qty: 90 TABLET | Refills: 3 | Status: SHIPPED | OUTPATIENT
Start: 2025-03-04

## 2025-03-21 ENCOUNTER — READMISSION MANAGEMENT (OUTPATIENT)
Dept: CALL CENTER | Facility: HOSPITAL | Age: 65
End: 2025-03-21
Payer: MEDICARE

## 2025-03-22 NOTE — OUTREACH NOTE
Prep Survey      Flowsheet Row Responses   [facility] facility patient discharged from? Non-[facility]   Is LACE score < 7 ? Non-BH Discharge   Eligibility Not Eligible   What are the reasons patient is not eligible? [facility]   Does the patient have one of the following disease processes/diagnoses(primary or secondary)? Other   Prep survey completed? Yes            ALIVIA WALL - Registered Nurse

## 2025-03-29 DIAGNOSIS — I10 ESSENTIAL (PRIMARY) HYPERTENSION: ICD-10-CM

## 2025-03-31 RX ORDER — LOSARTAN POTASSIUM 100 MG/1
100 TABLET ORAL EVERY MORNING
Qty: 90 TABLET | Refills: 3 | Status: SHIPPED | OUTPATIENT
Start: 2025-03-31

## 2025-04-01 DIAGNOSIS — I10 ESSENTIAL (PRIMARY) HYPERTENSION: ICD-10-CM

## 2025-04-01 RX ORDER — CARVEDILOL 12.5 MG/1
12.5 TABLET ORAL 2 TIMES DAILY WITH MEALS
Qty: 180 TABLET | Refills: 3 | Status: SHIPPED | OUTPATIENT
Start: 2025-04-01

## 2025-04-16 ENCOUNTER — TELEPHONE (OUTPATIENT)
Dept: FAMILY MEDICINE CLINIC | Facility: CLINIC | Age: 65
End: 2025-04-16

## 2025-04-16 ENCOUNTER — OFFICE VISIT (OUTPATIENT)
Dept: FAMILY MEDICINE CLINIC | Facility: CLINIC | Age: 65
End: 2025-04-16
Payer: MEDICARE

## 2025-04-16 VITALS
OXYGEN SATURATION: 98 % | DIASTOLIC BLOOD PRESSURE: 68 MMHG | HEIGHT: 68 IN | WEIGHT: 174 LBS | TEMPERATURE: 97.9 F | HEART RATE: 45 BPM | SYSTOLIC BLOOD PRESSURE: 90 MMHG | BODY MASS INDEX: 26.37 KG/M2

## 2025-04-16 DIAGNOSIS — D64.9 ANEMIA, UNSPECIFIED TYPE: ICD-10-CM

## 2025-04-16 DIAGNOSIS — J18.9 PNEUMONIA DUE TO INFECTIOUS ORGANISM, UNSPECIFIED LATERALITY, UNSPECIFIED PART OF LUNG: ICD-10-CM

## 2025-04-16 DIAGNOSIS — Z93.1 S/P PERCUTANEOUS ENDOSCOPIC GASTROSTOMY (PEG) TUBE PLACEMENT: ICD-10-CM

## 2025-04-16 DIAGNOSIS — E03.9 HYPOTHYROIDISM, UNSPECIFIED TYPE: ICD-10-CM

## 2025-04-16 DIAGNOSIS — Z51.81 MEDICATION MONITORING ENCOUNTER: ICD-10-CM

## 2025-04-16 DIAGNOSIS — G62.0 PERIPHERAL NEUROPATHY DUE TO CHEMOTHERAPY: ICD-10-CM

## 2025-04-16 DIAGNOSIS — T45.1X5A PERIPHERAL NEUROPATHY DUE TO CHEMOTHERAPY: ICD-10-CM

## 2025-04-16 DIAGNOSIS — Z93.0 TRACHEOSTOMY IN PLACE: ICD-10-CM

## 2025-04-16 DIAGNOSIS — N18.30 STAGE 3 CHRONIC KIDNEY DISEASE, UNSPECIFIED WHETHER STAGE 3A OR 3B CKD: ICD-10-CM

## 2025-04-16 DIAGNOSIS — Z90.49 S/P GLOSSECTOMY: Primary | ICD-10-CM

## 2025-04-16 DIAGNOSIS — I48.91 ATRIAL FIBRILLATION, UNSPECIFIED TYPE: ICD-10-CM

## 2025-04-16 DIAGNOSIS — R00.0 TACHYARRHYTHMIA: ICD-10-CM

## 2025-04-16 DIAGNOSIS — I10 ESSENTIAL (PRIMARY) HYPERTENSION: ICD-10-CM

## 2025-04-16 DIAGNOSIS — E55.9 VITAMIN D DEFICIENCY: ICD-10-CM

## 2025-04-16 DIAGNOSIS — E78.5 HYPERLIPIDEMIA, UNSPECIFIED HYPERLIPIDEMIA TYPE: ICD-10-CM

## 2025-04-16 DIAGNOSIS — G89.3 CANCER ASSOCIATED PAIN: ICD-10-CM

## 2025-04-16 DIAGNOSIS — C02.9 TONGUE CANCER: ICD-10-CM

## 2025-04-16 DIAGNOSIS — C76.0 PRIMARY SQUAMOUS CELL CARCINOMA OF HEAD AND NECK: ICD-10-CM

## 2025-04-16 RX ORDER — ONDANSETRON 8 MG/1
8 TABLET, FILM COATED ORAL EVERY 8 HOURS PRN
Qty: 90 TABLET | Refills: 3 | Status: SHIPPED | OUTPATIENT
Start: 2025-04-16

## 2025-04-16 RX ORDER — CARVEDILOL 12.5 MG/1
6.25 TABLET ORAL 2 TIMES DAILY WITH MEALS
Qty: 90 TABLET | Refills: 3 | Status: SHIPPED | OUTPATIENT
Start: 2025-04-16 | End: 2025-04-16 | Stop reason: SDUPTHER

## 2025-04-16 RX ORDER — CARVEDILOL 12.5 MG/1
6.25 TABLET ORAL 2 TIMES DAILY WITH MEALS
Qty: 90 TABLET | Refills: 3 | Status: SHIPPED | OUTPATIENT
Start: 2025-04-16

## 2025-04-16 RX ORDER — LOSARTAN POTASSIUM 100 MG/1
50 TABLET ORAL EVERY MORNING
Qty: 90 TABLET | Refills: 3 | Status: SHIPPED | OUTPATIENT
Start: 2025-04-16 | End: 2025-04-16 | Stop reason: SDUPTHER

## 2025-04-16 RX ORDER — GABAPENTIN 250 MG/5ML
300 SOLUTION ORAL 2 TIMES DAILY
Qty: 360 ML | Refills: 0 | Status: SHIPPED | OUTPATIENT
Start: 2025-04-16

## 2025-04-16 RX ORDER — OXYCODONE HYDROCHLORIDE AND ACETAMINOPHEN 325; 5 MG/5ML; MG/5ML
5 SOLUTION ORAL EVERY 6 HOURS PRN
Qty: 500 ML | Refills: 0 | Status: SHIPPED | OUTPATIENT
Start: 2025-04-16 | End: 2025-04-21

## 2025-04-16 RX ORDER — PROMETHAZINE HYDROCHLORIDE 25 MG/1
25 TABLET ORAL EVERY 6 HOURS PRN
Qty: 120 TABLET | Refills: 3 | Status: SHIPPED | OUTPATIENT
Start: 2025-04-16

## 2025-04-16 RX ORDER — LOSARTAN POTASSIUM 100 MG/1
50 TABLET ORAL EVERY MORNING
Qty: 90 TABLET | Refills: 3 | Status: SHIPPED | OUTPATIENT
Start: 2025-04-16

## 2025-04-16 RX ORDER — GUAIFENESIN 200 MG/10ML
400 LIQUID ORAL 3 TIMES DAILY PRN
Qty: 473 ML | Refills: 3 | Status: SHIPPED | OUTPATIENT
Start: 2025-04-16

## 2025-04-16 NOTE — TELEPHONE ENCOUNTER
Refill of Losartan and Coreg sent to the pharmacy as the patient was out of in the home per home health nurse as the patient had previously been on Cardizem, Metropolol, and Amiodarone in the hospital. Remainder of medication was verified per medication list. Nurse stated that the medications in the home are the same as our medication list however the two needed refilled. Offered assistance if needed with voiced appreciation.

## 2025-04-16 NOTE — PROGRESS NOTES
Chief Complaint  Hospital Follow Up Visit    Subjective          Jeyson Rose presents to [facility]    History of Present Illness     History of Present Illness  The patient is a 64-year-old male who presents today for a follow-up appointment from a recent hospitalization. He is accompanied by his wife. Patient was hospitalized at [facility] and was at [facility] for rehab.  He was discharged on 4/2/2025.    The chief complaint is follow-up from hospitalization due to cancer surgery. He underwent a total glossectomy, which necessitated the removal of his jaw and part of his facial skin, replaced with harvested bone from his rib. A tracheostomy is in place, and he communicates via writing. Hospitalization lasted nearly 6 weeks at [facility] for cancer surgery, during which 4 drains were inserted, 2 of which remain in place. Tube feeds are ongoing, prescribed by the cancer group. The course of Cipro is completed, and chemotherapy is scheduled to start next Wednesday, with radiation therapy planned for 5 days a week for 6 weeks starting Monday. An appointment with Dr. Ortega is scheduled for Friday. He is not currently on any antibiotics. A PEG tube and tracheostomy are in place. He is not currently taking guaifenesin. Phenergan and Zofran are able to be crushed. Gabapentin 300 mg twice daily is taken for neuropathy, and oxycodone is used for cancer-related pain. Blood transfusions have been received in the past few months. Klonopin is not currently taken, and Narcan is not available at home.    Nonproductive coughs with green phlegm are reported, but no fever is present.    Metoprolol is taken twice daily for blood pressure control, along with losartan. Amlodipine is not taken.    Levothyroxine 50 mcg is taken for hypothyroidism.    Eliquis is taken for atrial fibrillation.    PAST SURGICAL HISTORY:  - Total glossectomy  - Segmental mandibulectomy  - Composite resection of right  "facial skin  - Bilateral radical neck modified dissection  - Tracheostomy  - Ostiocutaneous free flap reconstruction  - Full mouth dental extraction  - PEG tube placement         Current Outpatient Medications   Medication Instructions    albuterol sulfate  (90 Base) MCG/ACT inhaler 2 puffs, Inhalation, Every 4 Hours PRN    apixaban (ELIQUIS) 2.5 mg, 2 Times Daily    atorvastatin (LIPITOR) 10 mg, Oral, Daily    carvedilol (COREG) 6.25 mg, Oral, 2 Times Daily With Meals    esomeprazole (NEXIUM) 20 mg, Every Morning Before Breakfast    gabapentin (NEURONTIN) 300 mg, Oral, 2 Times Daily    guaifenesin (ROBITUSSIN) 400 mg, Oral, 3 Times Daily PRN    levothyroxine (SYNTHROID, LEVOTHROID) 50 mcg, Oral, Daily    losartan (COZAAR) 50 mg, Oral, Every Morning    naloxone (NARCAN) 4 MG/0.1ML nasal spray Call 911. Don't prime. [place] in 1 nostril for overdose. Repeat in 2-3 minutes in other nostril if no or minimal breathing/responsiveness.    ofloxacin (FLOXIN) 0.3 % otic solution INSTILL 5 DROPS TO AFFECTED EAR TWICE DAILY FOR 10 DAYS    ondansetron (ZOFRAN) 8 mg, Oral, Every 8 Hours PRN    oxyCODONE-acetaminophen (ROXICET,PERCOCET) 5-325 MG/5ML solution 5 mL, Oral, Every 6 Hours PRN    promethazine (PHENERGAN) 25 mg, Oral, Every 6 Hours PRN    rOPINIRole (REQUIP) 2 mg, Oral, Nightly    tamsulosin (FLOMAX) 0.4 mg    triamcinolone (KENALOG) 0.1 % cream APPLY TOPICALLY TO THE AFFECTED AREA TWICE DAILY AS DIRECTED    vitamin D (ERGOCALCIFEROL) 50,000 Units, Oral, Weekly       The following portions of the patient's history were reviewed and updated as appropriate: allergies, current medications, past family history, past medical history, past social history, past surgical history, and problem list.    Objective   Vital Signs:   BP 90/68   Pulse (!) 45   Temp 97.9 °F (36.6 °C) (Temporal)   Ht 172.7 cm (68\")   Wt 78.9 kg (174 lb)   SpO2 98%   BMI 26.46 kg/m²     BP Readings from Last 3 Encounters:   04/16/25 90/68 "   02/06/25 100/64   10/22/24 128/82     Wt Readings from Last 3 Encounters:   04/16/25 78.9 kg (174 lb)   02/06/25 85.8 kg (189 lb 3.2 oz)   10/22/24 91.2 kg (201 lb)           Physical Exam  Vitals reviewed.   Constitutional:       Appearance: Normal appearance.   HENT:      Head: Normocephalic and atraumatic.      Right Ear: External ear normal.      Left Ear: External ear normal.   Eyes:      Conjunctiva/sclera: Conjunctivae normal.   Cardiovascular:      Rate and Rhythm: Regular rhythm. Bradycardia present.      Heart sounds: No murmur heard.     No friction rub. No gallop.   Pulmonary:      Effort: Pulmonary effort is normal.      Breath sounds: Normal breath sounds. No wheezing or rhonchi.      Comments: Tracheostomy in place.  Abdominal:      General: Bowel sounds are normal. There is no distension.      Palpations: Abdomen is soft.      Tenderness: There is no abdominal tenderness.   Skin:     Comments: Patient has 2 drains in place.  1 drain is in place on the right posterior back region and the other one is behind the left ear.  Patient has his transplanted skin flap noted on the right lower lip area.  There is evidence of healing with no active areas of infection.   Neurological:      Mental Status: He is alert and oriented to person, place, and time.      Cranial Nerves: No cranial nerve deficit.   Psychiatric:         Mood and Affect: Mood and affect normal.         Behavior: Behavior normal.         Thought Content: Thought content normal.         Judgment: Judgment normal.            Result Review :   The following data was reviewed by: Toño Valdovinos DO on 04/16/2025:  Common labs          5/14/2024    10:36 7/27/2024    00:13 10/18/2024    11:25   Common Labs   Glucose 107  111  92    BUN 19  20  16    Creatinine 1.35  1.26  1.21    Sodium 141  139  139    Potassium 3.8  3.6  3.8    Chloride 106  100  103    Calcium 9.1  9.1  9.9    Albumin 4.3  4.0  3.8    Total Bilirubin 0.5  0.6     Alkaline  Phosphatase 62  63     AST (SGOT) 18  24     ALT (SGPT) 19  27     WBC 5.23  9.57  8.62    Hemoglobin 14.7  14.3  14.1    Hematocrit 44.8  43.4  42.2    Platelets 195  189  216    Total Cholesterol 153      Triglycerides 43      HDL Cholesterol 78      LDL Cholesterol  65      Hemoglobin A1C 5.50      PSA 0.497               Lab Results   Component Value Date    SARSANTIGEN Not Detected 01/18/2023    COVID19 Not Detected 08/20/2022    RAPFLUA Negative 01/18/2023    RAPFLUB Negative 01/18/2023    INR 0.88 (L) 03/12/2021    BILIRUBINUR Negative 12/12/2024       Results  Labs   - Hemoglobin: 8.2 g/dL    Procedures        Assessment and Plan    Diagnoses and all orders for this visit:    1. S/P glossectomy (Primary)    2. Medication monitoring encounter    3. Tongue cancer    4. Primary squamous cell carcinoma of head and neck    5. S/P percutaneous endoscopic gastrostomy (PEG) tube placement    6. Tracheostomy in place    7. Atrial fibrillation, unspecified type  -     Ambulatory Referral to Cardiology  -     CBC & Differential; Future  -     Comprehensive Metabolic Panel; Future    8. Pneumonia due to infectious organism, unspecified laterality, unspecified part of lung  -     XR Chest PA & Lateral; Future    9. Tachyarrhythmia  -     Ambulatory Referral to Cardiology    10. Essential (primary) hypertension  -     losartan (COZAAR) 100 MG tablet; Take 0.5 tablets by mouth Every Morning.  Dispense: 90 tablet; Refill: 3  -     carvedilol (COREG) 12.5 MG tablet; Take 0.5 tablets by mouth 2 (Two) Times a Day With Meals.  Dispense: 90 tablet; Refill: 3  -     CBC & Differential; Future  -     Comprehensive Metabolic Panel; Future    11. Peripheral neuropathy due to chemotherapy  -     gabapentin (NEURONTIN) 50 mg/mL solution; Take 6 mL by mouth 2 (Two) Times a Day.  Dispense: 360 mL; Refill: 0    12. Cancer associated pain  -     oxyCODONE-acetaminophen (ROXICET,PERCOCET) 5-325 MG/5ML solution; Take 5 mL by mouth Every 6  (Six) Hours As Needed for Moderate Pain.  Dispense: 500 mL; Refill: 0    13. Anemia, unspecified type  -     Iron Profile; Future  -     Vitamin B12; Future  -     Folate; Future    14. Stage 3 chronic kidney disease, unspecified whether stage 3a or 3b CKD    15. Hypothyroidism, unspecified type  -     TSH+Free T4; Future    16. Vitamin D deficiency  -     Vitamin D,25-Hydroxy; Future    17. Hyperlipidemia, unspecified hyperlipidemia type  -     Lipid Panel; Future    Other orders  -     guaifenesin (ROBITUSSIN) 100 MG/5ML liquid; Take 20 mL by mouth 3 (Three) Times a Day As Needed for Cough.  Dispense: 473 mL; Refill: 3  -     promethazine (PHENERGAN) 25 MG tablet; Take 1 tablet by mouth Every 6 (Six) Hours As Needed for Nausea or Vomiting.  Dispense: 120 tablet; Refill: 3  -     ondansetron (ZOFRAN) 8 MG tablet; Take 1 tablet by mouth Every 8 (Eight) Hours As Needed for Nausea or Vomiting.  Dispense: 90 tablet; Refill: 3  -     naloxone (NARCAN) 4 MG/0.1ML nasal spray; Call 911. Don't prime. [place] in 1 nostril for overdose. Repeat in 2-3 minutes in other nostril if no or minimal breathing/responsiveness.  Dispense: 1 each; Refill: 0        Assessment & Plan  1. Squamous cell carcinoma of the head and neck.  - Underwent wide local excision of the right facial skin composite resection/segmental mandibulectomy, subtotal glossectomy, bilateral radical neck modified dissection, tracheostomy, osteocutaneous free flap reconstruction, and full mouth dental extraction on 02/25/2025, with PEG tube in place.  - Will start chemotherapy 1 day a week and radiation 5 days a week for 6 weeks starting Monday.  - Prescription for Narcan nasal spray will be provided.  - Will continue to follow up with the cancer care team.    2. Pneumonia.  - Treated for pneumonia with Cipro, which has been completed.  - Chest x-ray will be ordered to follow up on the pneumonia.  - Mucinex liquid will be prescribed to help with secretions.  He is  encouraged to follow-up with ENT in regards to tracheostomy care.    3. Atrial fibrillation.  - Experienced multiple runs of tachyarrhythmias during hospital stay and was treated for atrial fibrillation.  - Back on Eliquis.  - Cardiology consultation will be arranged.  They are requesting to see another cardiologist locally.    4. Hypothyroidism.  - Currently taking levothyroxine 50 mcg.  - Thyroid levels will be checked at the next visit.    5. Neuropathy.  - Severe neuropathy from chemotherapy, on gabapentin 300 mg twice a day.  - Prescription for gabapentin will be sent to [facility] in [place].    6. Pain management.  - On oxycodone for cancer-related pain.  - Prescription for oxycodone with Tylenol every 6 hours will be provided.  - Will need to call for refills.    7. Blood pressure management.  - Blood pressure and heart rate are low.  - Dosage of losartan will be reduced to 50 mg, and carvedilol will be adjusted to half a tablet twice daily.  He appears to be asymptomatic.  I will make the changes as discussed.  Plan to see him back in 1 month.    Follow-up  - Follow up in 1 month.       Medications Discontinued During This Encounter   Medication Reason    losartan (COZAAR) 100 MG tablet     amLODIPine (NORVASC) 10 MG tablet     carvedilol (COREG) 12.5 MG tablet     oxyCODONE-acetaminophen (Percocet)  MG per tablet     naloxone (NARCAN) 4 MG/0.1ML nasal spray Reorder    ondansetron (ZOFRAN) 8 MG tablet Reorder    promethazine (PHENERGAN) 25 MG tablet Reorder    gabapentin (NEURONTIN) 50 mg/mL solution Reorder          Follow Up   Return in about 1 month (around 5/16/2025) for head and neck cancer.  Patient was given instructions and counseling regarding his condition or for health maintenance advice. Please see specific information pulled into the AVS if appropriate.     Patient or patient representative verbalized consent for the use of Ambient Listening during the visit with  Toño Valdovinos DO  for chart documentation. 4/16/2025  13:23 EDT    Toño Valdovinos DO  04/16/25  13:23 EDT

## 2025-04-18 ENCOUNTER — TELEPHONE (OUTPATIENT)
Dept: FAMILY MEDICINE CLINIC | Facility: CLINIC | Age: 65
End: 2025-04-18
Payer: MEDICARE

## 2025-04-18 NOTE — TELEPHONE ENCOUNTER
[facility] pharmacy calling with questions about the combination of oxycodone and tylenol in a liquid form but they dont have that, [facility] has a just an oxycodone solution though and are questioning if that would work. Please advise thank you. Callback [phone]

## 2025-04-21 RX ORDER — OXYCODONE HCL 5 MG/5 ML
5 SOLUTION, ORAL ORAL EVERY 6 HOURS PRN
Qty: 473 ML | Refills: 0 | Status: SHIPPED | OUTPATIENT
Start: 2025-04-21

## 2025-04-25 ENCOUNTER — TELEPHONE (OUTPATIENT)
Dept: FAMILY MEDICINE CLINIC | Facility: CLINIC | Age: 65
End: 2025-04-25
Payer: MEDICARE

## 2025-04-25 NOTE — TELEPHONE ENCOUNTER
Spoke with Nano, patient's home health nurse.  She says she took patient blood pressure twice today which was around 190s for systolic.  Patient has also been going to radiation treatments and noted to have high blood pressure.  Most recent was on 4/23/2025 which showed 182/65.  Review Dr. Valdovinos note last appointment on 4/16/2025 which noted low blood pressure of 90/68 with a pulse of 45.  Most recent pulse was 49 at radiation oncology on 4/23/2025 even being on half the dose of his carvedilol.  I instructed nurse, Nano, to resume losartan 100 mg daily and go back up to his carvedilol 12.5 twice daily.  Patient does have an appointment with Dr. Valdovinos on 5/21/2025.  Instructed.  Need to call office if his blood pressure is not at goal.  Nano verbalized understanding.  If patient goes low again, he may need to reduce his carvedilol dose and stay on the losartan 100.

## 2025-04-25 NOTE — TELEPHONE ENCOUNTER
Nano ling/[facility] calling to report high /90 at home visit today 4/25. Pt has also had high readings at his radiation treatments recently.

## 2025-05-19 ENCOUNTER — HOSPITAL ENCOUNTER (EMERGENCY)
Facility: HOSPITAL | Age: 65
Discharge: HOME | End: 2025-05-19
Attending: EMERGENCY MEDICINE | Admitting: EMERGENCY MEDICINE
Payer: COMMERCIAL

## 2025-05-19 VITALS
SYSTOLIC BLOOD PRESSURE: 187 MMHG | BODY MASS INDEX: 23.42 KG/M2 | DIASTOLIC BLOOD PRESSURE: 98 MMHG | HEART RATE: 65 BPM | OXYGEN SATURATION: 97 % | RESPIRATION RATE: 18 BRPM | HEIGHT: 68 IN | WEIGHT: 154.54 LBS | TEMPERATURE: 97.8 F

## 2025-05-19 DIAGNOSIS — Z43.0 TRACHEOSTOMY CARE: Primary | ICD-10-CM

## 2025-05-19 PROCEDURE — 99283 EMERGENCY DEPT VISIT LOW MDM: CPT

## 2025-05-19 NOTE — DISCHARGE INSTRUCTIONS
Our respiratory therapist have evaluated your tracheostomy here today in the emergency department and it appears to be fully functional.  Return to the ER for fever, cough, difficulty breathing or chest pain.

## 2025-05-19 NOTE — ED PROVIDER NOTES
"Time: 5:48 PM EDT  Date of encounter:  5/19/2025  Independent Historian/Clinical History and Information was obtained by:   Patient    History is limited by:  Difficulty speaking secondary to tracheostomy    Chief Complaint: Sent from rehab facility due to difficulty with tracheostomy care      History of Present Illness:  Patient is a 64 y.o. year old male who presents to the emergency department for evaluation of reported resistance with changing tracheostomy inner cannula.  The patient himself has no complaints and denies chest pain or difficulty breathing.  Afebrile.      Patient Care Team  Primary Care Provider: Toño Valdovinos DO    Past Medical History:     Allergies   Allergen Reactions    Bupropion Other (See Comments)     Rash     Sulfa Antibiotics Rash     Past Medical History:   Diagnosis Date    Acid reflux disease     Arthritis     Cancer     in remission    Forgetfulness     Heart attack 2016    High blood pressure     History of cancer chemotherapy     History of irregular heartbeat     A-FIB, only 1 time d/t \"not taking b/p meds\"    History of radiation therapy     RIGHT NECK    Hypertension     Jaw cancer 04/2022    Left knee pain 08/2022    Lymphoma     Pain, knee     BILAT    Pedal edema     PONV (postoperative nausea and vomiting)     Tonsillar cancer     RIGHT, STAGE IV    Unsteady gait     D/T KNEES BILAT    Weakness     KNEES BILAT     Past Surgical History:   Procedure Laterality Date    CARDIAC CATHETERIZATION      COLONOSCOPY  2018    normal per patient    COLONOSCOPY N/A 3/21/2024    Procedure: COLONOSCOPY with hot snare polypectomy;  Surgeon: Sudeep Sutherland MD;  Location: [facility] ENDOSCOPY;  Service: General;  Laterality: N/A;  colon polyp    EYE SURGERY      HERNIA REPAIR Bilateral     JOINT REPLACEMENT      NECK SURGERY Right     TONSILS, MASS, LYMPH NODE 2016 SQUAMOS CELL CARCINOMA    OTHER SURGICAL HISTORY      for jaw cancer    TONSILLECTOMY  2016    cancer    TOTAL KNEE " ARTHROPLASTY Right 03/26/2021    Procedure: TOTAL KNEE ARTHROPLASTY;  Surgeon: Song Marsh MD;  Location: [facility] MAIN OR;  Service: Orthopedics;  Laterality: Right;    TOTAL KNEE ARTHROPLASTY Left 8/23/2022    Procedure: Left total knee arthroplasty with robotics;  Surgeon: Song Marsh MD;  Location: [facility] MAIN OR;  Service: Robotics - Ortho;  Laterality: Left;     Family History   Problem Relation Age of Onset    Heart attack Maternal Grandfather     Malig Hyperthermia Neg Hx     Colon cancer Neg Hx        Home Medications:  Prior to Admission medications    Medication Sig Start Date End Date Taking? Authorizing Provider   albuterol sulfate  (90 Base) MCG/ACT inhaler Inhale 2 puffs Every 4 (Four) Hours As Needed for Wheezing. 1/18/23   Christin De Jesus APRN   apixaban (ELIQUIS) 2.5 MG tablet tablet Take 1 tablet by mouth 2 (Two) Times a Day.    Provider, MD Thea   atorvastatin (LIPITOR) 10 MG tablet TAKE 1 TABLET BY MOUTH DAILY 1/31/25   Toño Valdovinos DO   carvedilol (COREG) 12.5 MG tablet Take 0.5 tablets by mouth 2 (Two) Times a Day With Meals. 4/16/25   Toño Vadlovinos DO   esomeprazole (nexIUM) 20 MG capsule Take 1 capsule by mouth Every Morning Before Breakfast. powder    Provider, MD Thea   gabapentin (NEURONTIN) 50 mg/mL solution Take 6 mL by mouth 2 (Two) Times a Day. 4/16/25   Toño Valdovinos DO   guaifenesin (ROBITUSSIN) 100 MG/5ML liquid Take 20 mL by mouth 3 (Three) Times a Day As Needed for Cough. 4/16/25   Toño Valdovinos DO   levothyroxine (SYNTHROID, LEVOTHROID) 50 MCG tablet TAKE 1 TABLET BY MOUTH DAILY 3/4/25   Toño Valdovinos DO   losartan (COZAAR) 100 MG tablet Take 0.5 tablets by mouth Every Morning. 4/16/25   Toño Valdovinos DO   naloxone (NARCAN) 4 MG/0.1ML nasal spray Call 911. Don't prime. John Day in 1 nostril for overdose. Repeat in 2-3 minutes in other nostril if no or minimal breathing/responsiveness. 4/16/25   Toño Valdovinos, DO    ofloxacin (FLOXIN) 0.3 % otic solution INSTILL 5 DROPS TO AFFECTED EAR TWICE DAILY FOR 10 DAYS  Patient not taking: Reported on 2025 10/16/24   Thea Hernandez MD   ondansetron (ZOFRAN) 8 MG tablet Take 1 tablet by mouth Every 8 (Eight) Hours As Needed for Nausea or Vomiting. 25   Toño Valdovinos DO   oxyCODONE (ROXICODONE) 5 MG/5ML solution Take 5 mL by mouth Every 6 (Six) Hours As Needed for Moderate Pain. 25   Toño Valdovinos DO   promethazine (PHENERGAN) 25 MG tablet Take 1 tablet by mouth Every 6 (Six) Hours As Needed for Nausea or Vomiting. 25   Toño Valdovinos DO   rOPINIRole (REQUIP) 1 MG tablet TAKE 2 TABLETS BY MOUTH EVERY NIGHT 24   Toño Valdovinos DO   tamsulosin (FLOMAX) 0.4 MG capsule 24 hr capsule Take 1 capsule by mouth.  Patient not taking: Reported on 2025 10/11/24   ProviderThea MD   triamcinolone (KENALOG) 0.1 % cream APPLY TOPICALLY TO THE AFFECTED AREA TWICE DAILY AS DIRECTED  Patient not taking: Reported on 2025   Rajendra Sam APRN   vitamin D (ERGOCALCIFEROL) 1.25 MG (85830 UT) capsule capsule Take 1 capsule by mouth 1 (One) Time Per Week.  Patient not taking: Reported on 2025   Rajendra Sam APRN        Social History:   Social History     Tobacco Use    Smoking status: Former     Current packs/day: 0.00     Average packs/day: 1.5 packs/day for 30.0 years (45.0 ttl pk-yrs)     Types: Cigarettes     Start date:      Quit date: 2016     Years since quittin.3     Passive exposure: Past    Smokeless tobacco: Never   Vaping Use    Vaping status: Never Used   Substance Use Topics    Alcohol use: Yes     Alcohol/week: 5.0 standard drinks of alcohol     Types: 5 Cans of beer per week    Drug use: Never         Review of Systems:  Review of Systems   Respiratory:  Negative for cough, shortness of breath and wheezing.    Cardiovascular:  Negative for chest pain.        Physical Exam:  BP (!) 187/98 (BP  "Location: Right arm, Patient Position: Sitting)   Pulse 56   Temp 97.8 °F (36.6 °C) (Axillary)   Resp 18   Ht 172.7 cm (68\")   Wt 70.1 kg (154 lb 8.7 oz)   SpO2 98%   BMI 23.50 kg/m²     Physical Exam  Vitals and nursing note reviewed.   Constitutional:       General: He is awake.      Appearance: Normal appearance.   HENT:      Head: Normocephalic and atraumatic.      Nose: Nose normal.      Mouth/Throat:      Mouth: Mucous membranes are moist.   Eyes:      Extraocular Movements: Extraocular movements intact.      Pupils: Pupils are equal, round, and reactive to light.   Cardiovascular:      Rate and Rhythm: Normal rate and regular rhythm.      Heart sounds: Normal heart sounds.   Pulmonary:      Effort: Pulmonary effort is normal. No respiratory distress.      Breath sounds: Normal breath sounds. No wheezing, rhonchi or rales.   Abdominal:      General: Bowel sounds are normal.      Palpations: Abdomen is soft.      Tenderness: There is no abdominal tenderness. There is no guarding or rebound.      Comments: No rigidity   Musculoskeletal:         General: No tenderness. Normal range of motion.      Cervical back: Normal range of motion and neck supple.   Skin:     General: Skin is warm and dry.      Coloration: Skin is not jaundiced.   Neurological:      General: No focal deficit present.      Mental Status: He is alert. Mental status is at baseline.   Psychiatric:         Mood and Affect: Mood normal.                    Medical Decision Making:      Comorbidities that affect care:    Hypertension, head/neck cancer status post glossectomy    External Notes reviewed:    Previous Clinic Note: Oncology office visit 4/23/2025.  Description: Primary squamous cell carcinoma of the head/neck.  Dysphagia      The following orders were placed and all results were independently analyzed by me:  No orders of the defined types were placed in this encounter.      Medications Given in the Emergency " Department:  Medications - No data to display     ED Course:         Labs:    Lab Results (last 24 hours)       ** No results found for the last 24 hours. **             Imaging:    No Radiology Exams Resulted Within Past 24 Hours      Differential Diagnosis and Discussion:    Dyspnea: Differential diagnosis includes but is not limited to metabolic acidosis, neurological disorders, psychogenic, asthma, pneumothorax, upper airway obstruction, COPD, pneumonia, noncardiogenic pulmonary edema, interstitial lung disease, anemia, congestive heart failure, and pulmonary embolism    PROCEDURES:        No orders to display       Procedures    MDM                     Patient Care Considerations:    LABS: I considered ordering labs, however the patient is afebrile and essentially asymptomatic.  He voices no dyspnea.  There is no reported hypoxia.  The patient has no chest pain.      Consultants/Shared Management Plan:    Respiratory therapy: I have consulted respiratory therapy and they evaluated the tracheostomy here in the emergency department and find it to be normal in function with no evidence of obstruction.    Social Determinants of Health:    Patient is a nursing home/assisted living resident and has reliable access to care.      Disposition and Care Coordination:    Discharged: I considered escalation of care by admitting this patient to the hospital, however the tracheostomy here is fully functional with no hypoxia.  The patient is asymptomatic.    I have explained the patient´s condition, diagnoses and treatment plan based on the information available to me at this time. I have answered questions and addressed any concerns. The patient has a good  understanding of the patient´s diagnosis, condition, and treatment plan as can be expected at this point. The vital signs have been stable. The patient´s condition is stable and appropriate for discharge from the emergency department.      The patient will pursue further  outpatient evaluation with the primary care physician or other designated or consulting physician as outlined in the discharge instructions. They are agreeable to this plan of care and follow-up instructions have been explained in detail. The patient has received these instructions in written format and has expressed an understanding of the discharge instructions. The patient is aware that any significant change in condition or worsening of symptoms should prompt an immediate return to this or the closest emergency department or call to 911.    Final diagnoses:   Tracheostomy care        ED Disposition       ED Disposition   Discharge    Condition   Stable    Comment   --               This medical record created using voice recognition software.             Yovany Mcdermott MD  05/19/25 [id number]

## 2025-08-18 ENCOUNTER — TELEPHONE (OUTPATIENT)
Dept: ADMINISTRATIVE | Facility: OTHER | Age: 65
End: 2025-08-18
Payer: MEDICARE

## (undated) DEVICE — PK TOTL KN 50

## (undated) DEVICE — GLV SURG SENSICARE PI LF PF 8 GRN STRL

## (undated) DEVICE — PAD UNDERCAST WYTEX 4IN 4YD LF STRL

## (undated) DEVICE — OPTIFOAM GENTLE SA, POSTOP, 4X12: Brand: MEDLINE

## (undated) DEVICE — TRAP FLD MINIVAC MEGADYNE 100ML

## (undated) DEVICE — MAT FLR ABSORBENT LG 4FT 10 2.5FT

## (undated) DEVICE — SYR LUERLOK 30CC

## (undated) DEVICE — BNDG ESMARK 6INX9FT STRL

## (undated) DEVICE — DECANTER: Brand: UNBRANDED

## (undated) DEVICE — ART/SRF KN PERSONA/VE PS EF 8TO11 10MM LT
Type: IMPLANTABLE DEVICE | Site: KNEE | Status: NON-FUNCTIONAL
Removed: 2022-08-23

## (undated) DEVICE — WRAP KNEE COLD THERAPY

## (undated) DEVICE — ZIPPERED TOGA, 2X LARGE: Brand: FLYTE

## (undated) DEVICE — ADHS LIQ MASTISOL 2/3ML

## (undated) DEVICE — SOL IRR H2O BTL 1000ML STRL

## (undated) DEVICE — STERILE PATIENT PROTECTIVE PAD FOR IMP® KNEE POSITIONERS & COHESIVE WRAP (10 / CASE): Brand: DE MAYO KNEE POSITIONER®

## (undated) DEVICE — GLV SURG SIGNATURE ESSENTIAL PF LTX SZ8.5

## (undated) DEVICE — UNDERGLV SURG BIOGEL INDICAT PI SZ8 BLU

## (undated) DEVICE — SOLIDIFIER LIQLOC PLS 1500CC BT

## (undated) DEVICE — DUAL CUT SAGITTAL BLADE

## (undated) DEVICE — INTEGUSEAL MICROBIAL SEALANT: Brand: AVANOS

## (undated) DEVICE — Device: Brand: DEFENDO AIR/WATER/SUCTION AND BIOPSY VALVE

## (undated) DEVICE — TUBING, SUCTION, 1/4" X 10', STRAIGHT: Brand: MEDLINE

## (undated) DEVICE — Device

## (undated) DEVICE — DRP ROBOTIC ROSA BX/20

## (undated) DEVICE — SCRW HEX PERSONA 3.5X3.2X33MM
Type: IMPLANTABLE DEVICE | Site: KNEE | Status: NON-FUNCTIONAL
Removed: 2022-08-23

## (undated) DEVICE — PK KN TOTL 40

## (undated) DEVICE — UNDERCAST PADDING: Brand: DEROYAL

## (undated) DEVICE — APPL CHLORAPREP HI/LITE 26ML ORNG

## (undated) DEVICE — ANTIBACTERIAL UNDYED BRAIDED (POLYGLACTIN 910), SYNTHETIC ABSORBABLE SUTURE: Brand: COATED VICRYL

## (undated) DEVICE — THE SINGLE USE ETRAP – POLYP TRAP IS USED FOR SUCTION RETRIEVAL OF ENDOSCOPICALLY REMOVED POLYPS.: Brand: ETRAP

## (undated) DEVICE — GLV SURG PREMIERPRO ORTHO LTX PF SZ8 BRN

## (undated) DEVICE — ADHS SKIN PREMIERPRO EXOFIN TOPICAL HI/VISC .5ML

## (undated) DEVICE — SOLUTION,WATER,IRRIGATION,1000ML,STERILE: Brand: MEDLINE

## (undated) DEVICE — GLV SURG SENSICARE PI MIC PF SZ7 LF STRL

## (undated) DEVICE — STERILE POLYISOPRENE POWDER-FREE SURGICAL GLOVES WITH EMOLLIENT COATING: Brand: PROTEXIS

## (undated) DEVICE — BNDG ELAS ELITE V/CLOSE 6IN 5YD LF STRL

## (undated) DEVICE — STERILE POLYISOPRENE POWDER-FREE SURGICAL GLOVES: Brand: PROTEXIS

## (undated) DEVICE — SOL IRRIG NACL 1000ML

## (undated) DEVICE — KT SURG TURNOVER 050

## (undated) DEVICE — SUT MNCRYL 3/0 Y936H

## (undated) DEVICE — CONN JET HYDRA H20 AUXILIARY DISP

## (undated) DEVICE — SOL IRR NACL 0.9PCT ARTHROMATIC 3000ML

## (undated) DEVICE — GLV SURG SENSICARE PI ORTHO SZ8 LF STRL

## (undated) DEVICE — UNDYED BRAIDED (POLYGLACTIN 910), SYNTHETIC ABSORBABLE SUTURE: Brand: COATED VICRYL

## (undated) DEVICE — DRAPE,C-SECTION,CLR SCREEN,WIRE: Brand: MEDLINE

## (undated) DEVICE — PENCL E/S ULTRAVAC TELESCP NOSE HOLSTR 10FT

## (undated) DEVICE — CUFF TOURNI 1BLADDER 1PRT 30IN STRL

## (undated) DEVICE — CEMENT MIXING SYSTEM WITH FEMORAL BREAKWAY NOZZLE: Brand: REVOLUTION

## (undated) DEVICE — SUT VIC 2/0 CT2 27IN J269H

## (undated) DEVICE — IRRIGATOR BULB ASEPTO 60CC STRL

## (undated) DEVICE — BNDG ELAS CO-FLEX SLF ADHR 6IN 5YD LF STRL

## (undated) DEVICE — PICO 7 10CM X 30CM: Brand: PICO™ 7

## (undated) DEVICE — LINER SURG CANSTR SXN S/RIGD 1500CC

## (undated) DEVICE — CELLERATERX SURGICAL HYDROLYZED COLLAGEN 1G TYPE I BOVINE COLLAGEN FOR CHRONIC AND ACUTE WOUNDS, PARTIAL AND FULL THICKNESS WOUNDS, PRESSURE INJURIES I-IV, VENOUS STASIS ULCERS, ARTERIAL ULCERS, DIABETIC ULCERS, TRAUMATIC WOUNDS, FIRST AND SECOND DEGREE BURNS, SUPERFICIAL WOUNDS AND SURGICAL WOUNDS. STERILE UNTIL OPENED.  STORE AT ROOM TEMPERATURE. FOR USE ON MODERATELY TO HEAVILY EXUDATIVE WOUNDS. CLEANSE THE WOUND PER FACILITY PROTOCOL.  APPLY CELLERATERX POWDER OVER THE ENTIRE WOUND BED AND THE EDGES OF THE WOUND.  COVER WITH AN APPROPRIATE DRESSING.  REAPPLY 2-3 TIMES A WEEK, OR WITH EACH DRESSING CHANGE, TAKING CARE NOT TO DISRUPT WOUND SITE. HTTPS://SANARAMEDTECH.COM/SURGICAL/CELLERATERX-SURGICAL/: Brand: CELLERATERX SURGICAL

## (undated) DEVICE — NEEDLE, QUINCKE, 18GX3.5": Brand: MEDLINE

## (undated) DEVICE — SOL IRRG H2O PL/BG 1000ML STRL

## (undated) DEVICE — SNAR E/S POLYP SNAREMASTER OVL/10MM 2.8X2300MM YEL

## (undated) DEVICE — PAD UNDERCAST WYTEX 6IN 4YD LF STRL

## (undated) DEVICE — INSTRUMENT BATTERY

## (undated) DEVICE — SPNG CVR STR 2S 4X4

## (undated) DEVICE — PIN DRL NOHEAD TROC 3.2X75MM